# Patient Record
Sex: MALE | Race: WHITE | NOT HISPANIC OR LATINO | Employment: OTHER | URBAN - NONMETROPOLITAN AREA
[De-identification: names, ages, dates, MRNs, and addresses within clinical notes are randomized per-mention and may not be internally consistent; named-entity substitution may affect disease eponyms.]

---

## 2017-02-13 ENCOUNTER — OFFICE VISIT (OUTPATIENT)
Dept: CARDIOLOGY | Facility: CLINIC | Age: 67
End: 2017-02-13

## 2017-02-13 VITALS
WEIGHT: 196.8 LBS | BODY MASS INDEX: 27.55 KG/M2 | DIASTOLIC BLOOD PRESSURE: 75 MMHG | SYSTOLIC BLOOD PRESSURE: 129 MMHG | HEIGHT: 71 IN | HEART RATE: 75 BPM | OXYGEN SATURATION: 97 %

## 2017-02-13 DIAGNOSIS — R53.82 CHRONIC FATIGUE: ICD-10-CM

## 2017-02-13 DIAGNOSIS — R06.02 SHORTNESS OF BREATH: Primary | ICD-10-CM

## 2017-02-13 DIAGNOSIS — E78.00 PURE HYPERCHOLESTEROLEMIA: ICD-10-CM

## 2017-02-13 PROBLEM — I10 ESSENTIAL HYPERTENSION: Status: ACTIVE | Noted: 2017-02-13

## 2017-02-13 PROCEDURE — 99213 OFFICE O/P EST LOW 20 MIN: CPT | Performed by: PHYSICIAN ASSISTANT

## 2017-02-13 NOTE — PROGRESS NOTES
Problem list     Subjective   Clarence Melo is a 66 y.o. male     Chief Complaint   Patient presents with   • Follow-up     6 mo.       HPI         Problem list  1. Low risk stress test 2016  2. Preserved systolic function  3. Dyslipidemia  4. Obstructive sleep apnea being treated with CPAP    Patient is a 66-year-old male that presents back for routine follow-up. He has done remarkably well. He describes having excellent functional status and walks approximately 3 miles a day. He has no ischemic symptoms. He denies any chest pain or pressure. He has only very mild dyspnea with exertion but this is chronic and with exertion. He denies PND orthopnea. He does not palpitate or have dysrhythmic symptoms. Otherwise states he is doing well.    Outpatient Encounter Prescriptions as of 2/13/2017   Medication Sig Dispense Refill   • aspirin 81 MG EC tablet Take 81 mg by mouth daily.     • azelastine (ASTELIN) 0.1 % nasal spray 1 spray daily.     • CRESTOR 20 MG tablet 1 tablet Every Night. Hasn't be taking every day     • fluticasone (FLONASE) 50 MCG/ACT nasal spray 1 spray as needed.     • [DISCONTINUED] cetirizine (ZyrTEC) 10 MG tablet Take 10 mg by mouth daily.       No facility-administered encounter medications on file as of 2/13/2017.        Review of patient's allergies indicates no known allergies.    Past Medical History   Diagnosis Date   • Environmental allergies    • Environmental allergies    • Hyperlipidemia        Social History     Social History   • Marital status: Single     Spouse name: N/A   • Number of children: N/A   • Years of education: N/A     Occupational History   • Not on file.     Social History Main Topics   • Smoking status: Former Smoker   • Smokeless tobacco: Never Used   • Alcohol use 0.6 oz/week     1 Cans of beer per week      Comment: socially   • Drug use: No   • Sexual activity: Not on file     Other Topics Concern   • Not on file     Social History Narrative       Family History  "  Problem Relation Age of Onset   • Dementia Mother    • Heart disease Father    • Hyperlipidemia Father    • Alzheimer's disease Other    • Heart failure Other        Review of Systems   Constitutional: Negative.    HENT: Negative.    Eyes: Positive for visual disturbance (wears glasses).   Respiratory: Positive for shortness of breath.    Cardiovascular: Positive for chest pain and palpitations (occas.). Negative for leg swelling.   Gastrointestinal: Negative.    Endocrine: Negative.    Genitourinary: Negative.    Musculoskeletal: Negative.    Skin: Negative.    Allergic/Immunologic: Negative.    Neurological: Negative.    Hematological: Negative.    Psychiatric/Behavioral: Positive for sleep disturbance.       Objective     Visit Vitals   • /75 (BP Location: Left arm, Patient Position: Sitting)   • Pulse 75   • Ht 71\" (180.3 cm)   • Wt 196 lb 12.8 oz (89.3 kg)   • SpO2 97%   • BMI 27.45 kg/m2       Lab Results (most recent)     None          Physical Exam   Constitutional: He is oriented to person, place, and time. He appears well-developed and well-nourished. No distress.   HENT:   Head: Normocephalic and atraumatic.   Eyes: EOM are normal. Pupils are equal, round, and reactive to light.   Neck: No JVD present.   Cardiovascular: Normal rate, regular rhythm, normal heart sounds and intact distal pulses.  Exam reveals no gallop and no friction rub.    No murmur heard.  Pulmonary/Chest: Effort normal and breath sounds normal. No respiratory distress. He has no wheezes. He has no rales. He exhibits no tenderness.   Abdominal: Soft.   Musculoskeletal: Normal range of motion. He exhibits no edema.   Neurological: He is alert and oriented to person, place, and time. No cranial nerve deficit.   Skin: Skin is warm and dry. No rash noted. No erythema. No pallor.   Psychiatric: He has a normal mood and affect. His behavior is normal.   Nursing note and vitals reviewed.      Procedure   Procedures   "     Assessment/Plan     Problems Addressed this Visit        Cardiovascular and Mediastinum    Hyperlipidemia       Respiratory    Shortness of breath - Primary       Other    Chronic fatigue              Recommendations  1. Patient is doing remarkably well. Denies any ischemic symptoms. He is active and voices no complaints. Lipids are monitored by primary care provider. We will see him back follow-up in 6 months to one year. Follow-up primary as scheduled

## 2017-08-14 ENCOUNTER — OFFICE VISIT (OUTPATIENT)
Dept: CARDIOLOGY | Facility: CLINIC | Age: 67
End: 2017-08-14

## 2017-08-14 VITALS
SYSTOLIC BLOOD PRESSURE: 143 MMHG | WEIGHT: 196 LBS | OXYGEN SATURATION: 95 % | HEIGHT: 71 IN | HEART RATE: 70 BPM | BODY MASS INDEX: 27.44 KG/M2 | DIASTOLIC BLOOD PRESSURE: 78 MMHG

## 2017-08-14 DIAGNOSIS — J30.89 NON-SEASONAL ALLERGIC RHINITIS DUE TO OTHER ALLERGIC TRIGGER: ICD-10-CM

## 2017-08-14 DIAGNOSIS — R06.02 SOB (SHORTNESS OF BREATH) ON EXERTION: ICD-10-CM

## 2017-08-14 DIAGNOSIS — I10 ESSENTIAL HYPERTENSION: Primary | ICD-10-CM

## 2017-08-14 DIAGNOSIS — E78.5 HYPERLIPIDEMIA, UNSPECIFIED HYPERLIPIDEMIA TYPE: ICD-10-CM

## 2017-08-14 PROCEDURE — 99213 OFFICE O/P EST LOW 20 MIN: CPT | Performed by: PHYSICIAN ASSISTANT

## 2017-08-14 PROCEDURE — 93000 ELECTROCARDIOGRAM COMPLETE: CPT | Performed by: PHYSICIAN ASSISTANT

## 2017-08-14 RX ORDER — TRAMADOL HYDROCHLORIDE 50 MG/1
50 TABLET ORAL AS NEEDED
COMMUNITY
End: 2018-01-29

## 2017-08-14 NOTE — PROGRESS NOTES
Problem list     Subjective   Clarence Melo is a 67 y.o. male     Chief Complaint   Patient presents with   • Follow-up     patient appears in office today for routine follow up ; patient denies any new onset cardiac issues at this time        HPI    Problem list  1. Low risk stress test 2016  2. Preserved systolic function  3. Dyslipidemia  4. Obstructive sleep apnea being treated with CPAP    Patient is a 67-year-old male that presents back to office for follow-up.  He describes doing well.  He has no chest pain or pressure.  He does experience mild dyspnea with exertion.  He feels that much of this is related to nasal congestion.  He describes that he can do exertional activities.  For instance, he complained golf as he did Friday, Saturday, and Sunday.  Patient describes that he pushed most his yard with no significant symptoms other than very mild dyspnea.  He denies PND orthopnea.  He doesn't palpitate or have dysrhythmic symptoms and otherwise is doing well      Outpatient Encounter Prescriptions as of 8/14/2017   Medication Sig Dispense Refill   • aspirin 81 MG EC tablet Take 81 mg by mouth daily.     • azelastine (ASTELIN) 0.1 % nasal spray 1 spray daily.     • CRESTOR 20 MG tablet 20 mg Every Night. Hasn't be taking every day     • fluticasone (FLONASE) 50 MCG/ACT nasal spray 1 spray as needed.     • traMADol (ULTRAM) 50 MG tablet Take 50 mg by mouth As Needed (for sleep).       No facility-administered encounter medications on file as of 8/14/2017.        Review of patient's allergies indicates no known allergies.    Past Medical History:   Diagnosis Date   • Environmental allergies    • Environmental allergies    • Hyperlipidemia        Social History     Social History   • Marital status: Single     Spouse name: N/A   • Number of children: N/A   • Years of education: N/A     Occupational History   • Not on file.     Social History Main Topics   • Smoking status: Former Smoker   • Smokeless tobacco: Never  "Used   • Alcohol use 0.6 oz/week     1 Cans of beer per week      Comment: socially   • Drug use: No   • Sexual activity: Defer     Other Topics Concern   • Not on file     Social History Narrative       Family History   Problem Relation Age of Onset   • Dementia Mother    • Heart disease Father    • Hyperlipidemia Father    • Alzheimer's disease Other    • Heart failure Other        Review of Systems   Constitutional: Negative.  Negative for fatigue.   HENT: Negative.    Eyes: Positive for visual disturbance (wears glasses daily).   Respiratory: Positive for shortness of breath (on exertion o nly). Negative for cough, chest tightness and wheezing.    Cardiovascular: Negative for chest pain, palpitations (occasional flutters) and leg swelling.   Gastrointestinal: Negative.  Negative for abdominal pain, nausea and vomiting.   Endocrine: Negative.  Negative for cold intolerance, heat intolerance, polyphagia and polyuria.   Genitourinary: Negative.  Negative for difficulty urinating, frequency and urgency.   Musculoskeletal: Negative.  Negative for arthralgias, back pain, myalgias, neck pain and neck stiffness.   Skin: Negative.  Negative for rash and wound.   Allergic/Immunologic: Positive for environmental allergies (seasonal allergies). Negative for food allergies.   Neurological: Negative.  Negative for dizziness, weakness, light-headedness and headaches.   Hematological: Negative.  Does not bruise/bleed easily.   Psychiatric/Behavioral: Negative for agitation, confusion and sleep disturbance (denies waking up SOA). The patient is not nervous/anxious.        Objective     /78 (BP Location: Left arm, Patient Position: Sitting)  Pulse 70  Ht 71\" (180.3 cm)  Wt 196 lb (88.9 kg)  SpO2 95%  BMI 27.34 kg/m2    Lab Results (most recent)     None          Physical Exam   Constitutional: He is oriented to person, place, and time. He appears well-developed and well-nourished. No distress.   HENT:   Head: " Normocephalic and atraumatic.   Eyes: EOM are normal. Pupils are equal, round, and reactive to light.   Neck: No JVD present.   Cardiovascular: Normal rate, regular rhythm and normal heart sounds.  Exam reveals no gallop and no friction rub.    No murmur heard.  Pulmonary/Chest: Effort normal and breath sounds normal. No respiratory distress. He has no wheezes. He has no rales. He exhibits no tenderness.   Musculoskeletal: Normal range of motion. He exhibits no edema.   Neurological: He is alert and oriented to person, place, and time. No cranial nerve deficit.   Skin: Skin is warm and dry. No rash noted. No erythema. No pallor.   Psychiatric: He has a normal mood and affect. His behavior is normal.   Nursing note and vitals reviewed.      Procedure     ECG 12 Lead  Date/Time: 8/14/2017 9:29 AM  Performed by: BAILEE WHITE  Authorized by: BAILEE WHITE   Comments: HTN  SOB    EKG demonstrates sinus rhythm at 67 bpm, normal axis, nonspecific T-wave changes.               Assessment/Plan     Problems Addressed this Visit        Cardiovascular and Mediastinum    Hyperlipidemia    Essential hypertension - Primary    Relevant Orders    ECG 12 Lead       Respiratory    Perennial allergic rhinitis    Relevant Orders    Ambulatory Referral to ENT (Otolaryngology)    SOB (shortness of breath) on exertion    Relevant Orders    ECG 12 Lead              Recommendation  1.  Patient doing well at this time.  He denies symptoms of angina, failure, or dysrhythmia.  He would like to be referred to ENT in regards to symptoms concerning for allergic rhinitis.  Otherwise is without ischemic symptoms.  He is on appropriate medications.  Recent lipids were reviewed by Dr. Light which were normal per patient report.  We will see him back for follow-up in 6 months or sooner symptoms discussed.  Follow-up primary as scheduled

## 2017-08-29 ENCOUNTER — TELEPHONE (OUTPATIENT)
Dept: CARDIOLOGY | Facility: CLINIC | Age: 67
End: 2017-08-29

## 2017-08-29 NOTE — TELEPHONE ENCOUNTER
I have called and spoke with patient , he stated earlier today he was having some SOB, the ENT told him to call and make us aware of this. When I called patient he stated he was feeling much better and was no longer SOB. He denied exertion when he was having this SOB, thinks it may be anxiety. I notified him that I would discuss this with Leandro Luz PA-C and let him know if there was any further orders. -; [facility]

## 2017-08-29 NOTE — TELEPHONE ENCOUNTER
Patient sees Leandro and we referred him to an ENT. His ENT requested we call him back if he continues to have further trouble with his breathing, which he states he is. He can be reached at [phone]

## 2017-08-29 NOTE — TELEPHONE ENCOUNTER
Patient called in and said that he saw Leandro and was referred out to an ENT. He calls to say that he was told by his ENT if he is still having difficulty breathing to contact us and let us know. He can be reached at [phone]

## 2018-01-29 ENCOUNTER — OFFICE VISIT (OUTPATIENT)
Dept: CARDIOLOGY | Facility: CLINIC | Age: 68
End: 2018-01-29

## 2018-01-29 VITALS
SYSTOLIC BLOOD PRESSURE: 140 MMHG | BODY MASS INDEX: 27.69 KG/M2 | HEART RATE: 84 BPM | DIASTOLIC BLOOD PRESSURE: 83 MMHG | OXYGEN SATURATION: 95 % | WEIGHT: 197.8 LBS | HEIGHT: 71 IN

## 2018-01-29 DIAGNOSIS — R06.02 SHORTNESS OF BREATH: Primary | ICD-10-CM

## 2018-01-29 DIAGNOSIS — R07.9 CHEST PAIN, UNSPECIFIED TYPE: ICD-10-CM

## 2018-01-29 DIAGNOSIS — R53.83 FATIGUE, UNSPECIFIED TYPE: ICD-10-CM

## 2018-01-29 DIAGNOSIS — R06.02 SHORTNESS OF BREATH: ICD-10-CM

## 2018-01-29 PROCEDURE — 99214 OFFICE O/P EST MOD 30 MIN: CPT | Performed by: PHYSICIAN ASSISTANT

## 2018-01-29 PROCEDURE — 93000 ELECTROCARDIOGRAM COMPLETE: CPT | Performed by: PHYSICIAN ASSISTANT

## 2018-01-29 RX ORDER — AMOXICILLIN 500 MG/1
1000 CAPSULE ORAL 2 TIMES DAILY
COMMUNITY
End: 2018-02-19

## 2018-01-29 RX ORDER — NITROGLYCERIN 0.4 MG/1
TABLET SUBLINGUAL
Qty: 100 TABLET | Refills: 11 | Status: SHIPPED | OUTPATIENT
Start: 2018-01-29 | End: 2021-12-20 | Stop reason: SDUPTHER

## 2018-01-29 RX ORDER — ZOLPIDEM TARTRATE 10 MG/1
5 TABLET ORAL NIGHTLY
COMMUNITY
Start: 2018-01-16 | End: 2019-02-19

## 2018-01-29 RX ORDER — ALBUTEROL SULFATE 90 UG/1
AEROSOL, METERED RESPIRATORY (INHALATION)
COMMUNITY
Start: 2017-12-04 | End: 2019-02-19

## 2018-01-29 RX ORDER — SILDENAFIL CITRATE 20 MG/1
20 TABLET ORAL
COMMUNITY
End: 2022-02-24 | Stop reason: ALTCHOICE

## 2018-01-29 NOTE — PROGRESS NOTES
Problem list     Subjective   Clarence Melo is a 67 y.o. male     Chief Complaint   Patient presents with   • Shortness of Breath     worsened, here for eval.       HPI    Problem list  1. Low risk stress test 2016  2. Preserved systolic function  3. Dyslipidemia  4. Obstructive sleep apnea being treated with CPAP    Patient is a 67-year-old male that presents back for routine follow-up.  Mr. Melo describes feeling poorly.  His wife, who accompanies him,  agrees that patient has had a steady decline.  He has had a decrease in functional status.  Patient has history of being quite active.  He was former  and .  He walks frequently.  Unfortunately, his energy level has declined significantly.  He does less activity with more shortness of breath.  His shortness of breath seems to limit him in regards to doing activity.  Patient is no longer able to play golf because of the shortness of breath.  At night, Mr. Melo describes having chest pressure.  It is usually only experienced at night.  He does not recall any during the day hours.  Wife is very concerned because patient's overall fatigue, weakness and significant dyspnea.    He doesn't palpitate or have dysrhythmic symptoms.  He otherwise voices no complaints      Outpatient Encounter Prescriptions as of 1/29/2018   Medication Sig Dispense Refill   • amoxicillin (AMOXIL) 500 MG capsule Take 1,000 mg by mouth 2 (Two) Times a Day.     • aspirin 81 MG EC tablet Take 81 mg by mouth daily.     • CRESTOR 20 MG tablet 20 mg Every Night.     • fluticasone (FLONASE) 50 MCG/ACT nasal spray 1 spray as needed.     • sildenafil (REVATIO) 20 MG tablet Take 20 mg by mouth. prn     • VENTOLIN  (90 Base) MCG/ACT inhaler prn     • zolpidem (AMBIEN) 10 MG tablet 5 mg Every Night.     • nitroglycerin (NITROSTAT) 0.4 MG SL tablet 1 under the tongue as needed for angina, may repeat q5mins for up three doses 100 tablet 11   • [DISCONTINUED] azelastine (ASTELIN)  "0.1 % nasal spray 1 spray daily.     • [DISCONTINUED] traMADol (ULTRAM) 50 MG tablet Take 50 mg by mouth As Needed (for sleep).       No facility-administered encounter medications on file as of 1/29/2018.        Review of patient's allergies indicates no known allergies.    Past Medical History:   Diagnosis Date   • Environmental allergies    • Environmental allergies    • Hyperlipidemia    • Sleep apnea     has a c-pap       Social History     Social History   • Marital status: Single     Spouse name: N/A   • Number of children: N/A   • Years of education: N/A     Occupational History   • Not on file.     Social History Main Topics   • Smoking status: Former Smoker   • Smokeless tobacco: Never Used   • Alcohol use 0.6 oz/week     1 Cans of beer per week      Comment: socially   • Drug use: No   • Sexual activity: Defer     Other Topics Concern   • Not on file     Social History Narrative       Family History   Problem Relation Age of Onset   • Dementia Mother    • Heart disease Father    • Hyperlipidemia Father    • Alzheimer's disease Other    • Heart failure Other        Review of Systems   Constitutional: Positive for fatigue.   HENT:        Chronic Rt. Ear and nasal area issues.    Eyes: Positive for visual disturbance (glasses).   Respiratory: Positive for shortness of breath.    Cardiovascular: Positive for chest pain.   Gastrointestinal: Negative.    Endocrine: Negative.    Genitourinary: Negative.    Musculoskeletal: Positive for arthralgias and myalgias.   Skin: Negative.    Allergic/Immunologic: Positive for environmental allergies.   Neurological: Positive for dizziness and light-headedness.        Occas.   Hematological: Negative.    Psychiatric/Behavioral: Positive for sleep disturbance.       Objective   Vitals:    01/29/18 0927   BP: 140/83   BP Location: Left arm   Patient Position: Sitting   Pulse: 84   SpO2: 95%   Weight: 89.7 kg (197 lb 12.8 oz)   Height: 180.3 cm (70.98\")      /83 (BP " "Location: Left arm, Patient Position: Sitting)  Pulse 84  Ht 180.3 cm (70.98\")  Wt 89.7 kg (197 lb 12.8 oz)  SpO2 95%  BMI 27.6 kg/m2    Lab Results (most recent)     None          Physical Exam   Constitutional: He is oriented to person, place, and time. He appears well-developed and well-nourished. No distress.   HENT:   Head: Normocephalic and atraumatic.   Eyes: EOM are normal. Pupils are equal, round, and reactive to light.   Neck: No JVD present.   Cardiovascular: Normal rate, regular rhythm and normal heart sounds.  Exam reveals no gallop and no friction rub.    No murmur heard.  Pulmonary/Chest: Effort normal and breath sounds normal. No respiratory distress. He has no wheezes. He has no rales. He exhibits no tenderness.   Musculoskeletal: Normal range of motion. He exhibits no edema.   Neurological: He is alert and oriented to person, place, and time. No cranial nerve deficit.   Skin: Skin is warm and dry. No rash noted. No erythema. No pallor.   Psychiatric: He has a normal mood and affect. His behavior is normal.   Nursing note and vitals reviewed.      Procedure     ECG 12 Lead  Date/Time: 1/29/2018 9:36 AM  Performed by: BAILEE WHITE  Authorized by: BAILEE WHITE   Comments: EKG indication shortness of breath    EKG demonstrates sinus rhythm at 74 bpm, otherwise normal               Assessment/Plan     Problems Addressed this Visit        Respiratory    Shortness of breath - Primary    Relevant Orders    ECG 12 Lead    Stress Test With Myocardial Perfusion One Day    Adult Transthoracic Echo Complete W/ Cont if Necessary Per Protocol    D-dimer, Quantitative    CBC & Differential    Comprehensive Metabolic Panel    Vitamin B12    Folate       Nervous and Auditory    Chest pain    Relevant Orders    Stress Test With Myocardial Perfusion One Day    Adult Transthoracic Echo Complete W/ Cont if Necessary Per Protocol    D-dimer, Quantitative    CBC & Differential    Comprehensive Metabolic " Panel    Vitamin B12    Folate       Other    Fatigue    Relevant Orders    ECG 12 Lead    Stress Test With Myocardial Perfusion One Day    Adult Transthoracic Echo Complete W/ Cont if Necessary Per Protocol    D-dimer, Quantitative    CBC & Differential    Comprehensive Metabolic Panel    Vitamin B12    Folate            Recommendation  1.  Patient has decline in functional status, profound exertional dyspnea, and now is developing chest pressure.  He describes to me he is not able to walk on a treadmill but I do feel he needs an ischemia assessment on an expedited basis.  Therefore we'll schedule Lexiscan stress test.  Echocardiogram to evaluate LV function.  2.  Would like to perform routine laboratories.  D-dimer to rule out noncardiac causes of chest pain and dyspnea.  Patient has occasional muscle cramping as well as symptoms concerning for neuropathy.  Would like to check routine laboratories for that as well.  3.  We are sending in nitroglycerin as needed for chest pain.  Any chest pain not resolved by nitroglycerin, he will go to the ER.  I would like to see him back in one to 2 weeks.  Follow-up with primary as scheduled         Electronically signed by:

## 2018-01-31 ENCOUNTER — OUTSIDE FACILITY SERVICE (OUTPATIENT)
Dept: CARDIOLOGY | Facility: CLINIC | Age: 68
End: 2018-01-31

## 2018-01-31 ENCOUNTER — HOSPITAL ENCOUNTER (OUTPATIENT)
Dept: CARDIOLOGY | Facility: HOSPITAL | Age: 68
Discharge: HOME | End: 2018-01-31

## 2018-01-31 LAB
MAXIMAL PREDICTED HEART RATE: 153 BPM
MAXIMAL PREDICTED HEART RATE: 153 BPM
STRESS TARGET HR: 130 BPM
STRESS TARGET HR: 130 BPM

## 2018-01-31 PROCEDURE — 93017 CV STRESS TEST TRACING ONLY: CPT

## 2018-01-31 PROCEDURE — 0 TECHNETIUM SESTAMIBI: Performed by: INTERNAL MEDICINE

## 2018-01-31 PROCEDURE — 78452 HT MUSCLE IMAGE SPECT MULT: CPT

## 2018-01-31 PROCEDURE — 93018 CV STRESS TEST I&R ONLY: CPT | Performed by: INTERNAL MEDICINE

## 2018-01-31 PROCEDURE — 93306 TTE W/DOPPLER COMPLETE: CPT | Performed by: INTERNAL MEDICINE

## 2018-01-31 PROCEDURE — 78452 HT MUSCLE IMAGE SPECT MULT: CPT | Performed by: INTERNAL MEDICINE

## 2018-01-31 PROCEDURE — A9500 TC99M SESTAMIBI: HCPCS | Performed by: INTERNAL MEDICINE

## 2018-01-31 PROCEDURE — 25010000002 REGADENOSON 0.4 MG/5ML SOLUTION: Performed by: INTERNAL MEDICINE

## 2018-01-31 PROCEDURE — 93306 TTE W/DOPPLER COMPLETE: CPT

## 2018-01-31 RX ADMIN — TECHNETIUM TC 99M SESTAMIBI 1 DOSE: 1 INJECTION INTRAVENOUS at 13:00

## 2018-01-31 RX ADMIN — REGADENOSON 0.4 MG: 0.08 INJECTION, SOLUTION INTRAVENOUS at 13:00

## 2018-02-01 ENCOUNTER — DOCUMENTATION (OUTPATIENT)
Dept: CARDIOLOGY | Facility: CLINIC | Age: 68
End: 2018-02-01

## 2018-02-19 ENCOUNTER — OFFICE VISIT (OUTPATIENT)
Dept: CARDIOLOGY | Facility: CLINIC | Age: 68
End: 2018-02-19

## 2018-02-19 VITALS
SYSTOLIC BLOOD PRESSURE: 123 MMHG | BODY MASS INDEX: 27.61 KG/M2 | HEART RATE: 84 BPM | HEIGHT: 71 IN | DIASTOLIC BLOOD PRESSURE: 76 MMHG | WEIGHT: 197.2 LBS | OXYGEN SATURATION: 96 %

## 2018-02-19 DIAGNOSIS — R53.82 CHRONIC FATIGUE: ICD-10-CM

## 2018-02-19 DIAGNOSIS — R06.02 SHORTNESS OF BREATH: Primary | ICD-10-CM

## 2018-02-19 DIAGNOSIS — E78.00 PURE HYPERCHOLESTEROLEMIA: ICD-10-CM

## 2018-02-19 PROCEDURE — 99213 OFFICE O/P EST LOW 20 MIN: CPT | Performed by: PHYSICIAN ASSISTANT

## 2018-02-19 NOTE — PROGRESS NOTES
Problem list     Subjective   Clarence Melo is a 67 y.o. male     Chief Complaint   Patient presents with   • Hypertension     Here for testing f/u   • Hyperlipidemia   • Palpitations       HPI       Problem list  1. Low risk stress test 2016  1.1 low risk stress test January 2018  2. Preserved systolic function  3. Dyslipidemia  4. Obstructive sleep apnea being treated with CPAP    Patient is a 67-year-old male that presents back to follow-up on stress test, echocardiogram and laboratories.  Stress test indicated normal systolic function with no evidence of ischemia.  Echocardiogram was largely normal with normal systolic function, no effusion, no significant pericardial, great vessel disease or valvular heart disease.  Laboratories were benign    Patient is doing remarkably well.  He does not experience chest pain or pressure.  He has mild dyspnea usually will help levels of activity but no significant shortness of breath at baseline.  Denies PND orthopnea.  He doesn't palpitate, have dizziness presyncope or syncope.  Occasionally will have orthostatic dizziness but otherwise he feels remarkably well this time he can be active with no limitation.      Outpatient Encounter Prescriptions as of 2/19/2018   Medication Sig Dispense Refill   • aspirin 81 MG EC tablet Take 81 mg by mouth daily.     • CRESTOR 20 MG tablet 20 mg Every Night.     • sildenafil (REVATIO) 20 MG tablet Take 20 mg by mouth. prn     • VENTOLIN  (90 Base) MCG/ACT inhaler prn     • zolpidem (AMBIEN) 10 MG tablet 5 mg Every Night.     • fluticasone (FLONASE) 50 MCG/ACT nasal spray 1 spray as needed.     • nitroglycerin (NITROSTAT) 0.4 MG SL tablet 1 under the tongue as needed for angina, may repeat q5mins for up three doses 100 tablet 11   • [DISCONTINUED] amoxicillin (AMOXIL) 500 MG capsule Take 1,000 mg by mouth 2 (Two) Times a Day.       No facility-administered encounter medications on file as of 2/19/2018.        Review of patient's  "allergies indicates no known allergies.    Past Medical History:   Diagnosis Date   • Environmental allergies    • Environmental allergies    • Hyperlipidemia    • Sleep apnea     has a c-pap       Social History     Social History   • Marital status: Single     Spouse name: N/A   • Number of children: N/A   • Years of education: N/A     Occupational History   • Not on file.     Social History Main Topics   • Smoking status: Former Smoker   • Smokeless tobacco: Never Used   • Alcohol use 0.6 oz/week     1 Cans of beer per week      Comment: socially   • Drug use: No   • Sexual activity: Defer     Other Topics Concern   • Not on file     Social History Narrative       Family History   Problem Relation Age of Onset   • Dementia Mother    • Heart disease Father    • Hyperlipidemia Father    • Alzheimer's disease Other    • Heart failure Other        Review of Systems   Constitutional: Positive for fatigue.   HENT: Positive for sore throat.    Eyes: Positive for visual disturbance (glasses).   Respiratory: Positive for shortness of breath.    Cardiovascular: Negative.    Gastrointestinal: Negative.    Endocrine: Negative.    Genitourinary: Negative.    Musculoskeletal: Positive for arthralgias and myalgias.   Skin: Negative.    Allergic/Immunologic: Positive for environmental allergies.   Neurological: Positive for dizziness (occas.).   Hematological: Negative.    Psychiatric/Behavioral: Positive for sleep disturbance.       Objective   Vitals:    02/19/18 0932   BP: 123/76   BP Location: Left arm   Patient Position: Sitting   Pulse: 84   SpO2: 96%   Weight: 89.4 kg (197 lb 3.2 oz)   Height: 180.3 cm (70.98\")      /76 (BP Location: Left arm, Patient Position: Sitting)  Pulse 84  Ht 180.3 cm (70.98\")  Wt 89.4 kg (197 lb 3.2 oz)  SpO2 96%  BMI 27.52 kg/m2    Lab Results (most recent)     None          Physical Exam   Constitutional: He is oriented to person, place, and time. He appears well-developed and " well-nourished. No distress.   HENT:   Head: Normocephalic and atraumatic.   Eyes: EOM are normal. Pupils are equal, round, and reactive to light.   Neck: No JVD present.   Cardiovascular: Normal rate, regular rhythm and normal heart sounds.  Exam reveals no gallop and no friction rub.    No murmur heard.  Pulmonary/Chest: Effort normal and breath sounds normal. No respiratory distress. He has no wheezes. He has no rales. He exhibits no tenderness.   Musculoskeletal: Normal range of motion. He exhibits no edema.   Neurological: He is alert and oriented to person, place, and time. No cranial nerve deficit.   Skin: Skin is warm and dry. No rash noted. No erythema. No pallor.   Psychiatric: He has a normal mood and affect. His behavior is normal.   Nursing note and vitals reviewed.      Procedure   Procedures       Assessment/Plan     Problems Addressed this Visit        Cardiovascular and Mediastinum    Pure hypercholesterolemia       Respiratory    Shortness of breath - Primary       Other    Chronic fatigue            Recommendation  1.  Patient doing remarkably well.  Stress test and echocardiogram normal.  Patient with no ischemic symptoms and normal functional status.  Mild fatigue but nothing in regards to laboratory to explain the etiology.  For now, we will see him back for follow-up in a year or sooner symptoms discussed.  He is to follow-up primary as scheduled         Electronically signed by:

## 2019-02-19 ENCOUNTER — OFFICE VISIT (OUTPATIENT)
Dept: CARDIOLOGY | Facility: CLINIC | Age: 69
End: 2019-02-19

## 2019-02-19 VITALS
SYSTOLIC BLOOD PRESSURE: 124 MMHG | WEIGHT: 196.6 LBS | BODY MASS INDEX: 27.52 KG/M2 | DIASTOLIC BLOOD PRESSURE: 68 MMHG | HEART RATE: 77 BPM | OXYGEN SATURATION: 97 % | HEIGHT: 71 IN

## 2019-02-19 DIAGNOSIS — E78.00 PURE HYPERCHOLESTEROLEMIA: ICD-10-CM

## 2019-02-19 DIAGNOSIS — R53.82 CHRONIC FATIGUE: ICD-10-CM

## 2019-02-19 DIAGNOSIS — R06.02 SOB (SHORTNESS OF BREATH) ON EXERTION: Primary | ICD-10-CM

## 2019-02-19 PROCEDURE — 99213 OFFICE O/P EST LOW 20 MIN: CPT | Performed by: PHYSICIAN ASSISTANT

## 2019-02-19 NOTE — PROGRESS NOTES
Problem list     Subjective   Clarence Melo is a 68 y.o. male     Chief Complaint   Patient presents with   • Follow-up     1 year   • Hypertension          Problem list  1. Low risk stress test 2016  1.1 low risk stress test January 2018  2. Preserved systolic function  3. Dyslipidemia  4. Obstructive sleep apnea being treated with CPAP        HPI    Patient is a 68-year-old male that presents back to the office for follow-up.  Patient is doing relatively well.    He has no chest discomfort or pressure in any way.  Patient otherwise describes being quite active.  He has very minimal dyspnea when trying to exert or do activity but this is chronic.  He tries to walk daily and has no ischemic symptoms.  He has no PND orthopnea.    He doesn't palpitate or have dysrhythmic symptoms.  He doesn't claudicate nor have symptoms of cerebral embolic events.  He is doing remarkably well otherwise      Outpatient Encounter Medications as of 2/19/2019   Medication Sig Dispense Refill   • CRESTOR 20 MG tablet 20 mg Every Night.     • fluticasone (FLONASE) 50 MCG/ACT nasal spray 1 spray as needed.     • nitroglycerin (NITROSTAT) 0.4 MG SL tablet 1 under the tongue as needed for angina, may repeat q5mins for up three doses 100 tablet 11   • sildenafil (REVATIO) 20 MG tablet Take 20 mg by mouth. prn     • [DISCONTINUED] aspirin 81 MG EC tablet Take 81 mg by mouth daily.     • [DISCONTINUED] VENTOLIN  (90 Base) MCG/ACT inhaler prn     • [DISCONTINUED] zolpidem (AMBIEN) 10 MG tablet 5 mg Every Night.       No facility-administered encounter medications on file as of 2/19/2019.        Patient has no known allergies.    Past Medical History:   Diagnosis Date   • Environmental allergies    • Environmental allergies    • Hyperlipidemia    • Sleep apnea     has a c-pap       Social History     Socioeconomic History   • Marital status: Single     Spouse name: Not on file   • Number of children: Not on file   • Years of education: Not  on file   • Highest education level: Not on file   Social Needs   • Financial resource strain: Not on file   • Food insecurity - worry: Not on file   • Food insecurity - inability: Not on file   • Transportation needs - medical: Not on file   • Transportation needs - non-medical: Not on file   Occupational History   • Not on file   Tobacco Use   • Smoking status: Former Smoker     Packs/day: 0.25     Years: 8.00     Pack years: 2.00   • Smokeless tobacco: Never Used   Substance and Sexual Activity   • Alcohol use: Yes     Alcohol/week: 0.6 oz     Types: 1 Cans of beer per week     Comment: socially   • Drug use: No   • Sexual activity: Defer   Other Topics Concern   • Not on file   Social History Narrative   • Not on file       Family History   Problem Relation Age of Onset   • Dementia Mother    • Heart disease Father    • Hyperlipidemia Father    • Alzheimer's disease Other    • Heart failure Other    • No Known Problems Sister    • No Known Problems Brother    • No Known Problems Maternal Aunt    • No Known Problems Maternal Uncle    • No Known Problems Paternal Aunt    • No Known Problems Paternal Uncle    • No Known Problems Maternal Grandmother    • No Known Problems Maternal Grandfather    • No Known Problems Paternal Grandmother    • No Known Problems Paternal Grandfather    • Anemia Neg Hx    • Arrhythmia Neg Hx    • Asthma Neg Hx    • Clotting disorder Neg Hx    • Fainting Neg Hx    • Heart attack Neg Hx    • Hypertension Neg Hx        Review of Systems   Constitutional: Positive for fatigue.   HENT: Negative.    Eyes: Positive for visual disturbance (wears glasses).   Respiratory: Positive for apnea (unable to tolerate cpap) and shortness of breath (on exertion and more near the end of day).    Cardiovascular: Negative.  Negative for chest pain, palpitations and leg swelling.   Gastrointestinal: Negative.    Endocrine: Negative.    Genitourinary: Negative.    Musculoskeletal: Positive for arthralgias,  "back pain and neck pain.   Skin: Negative.    Allergic/Immunologic: Negative.    Neurological: Negative.    Hematological: Negative.  Does not bruise/bleed easily.   Psychiatric/Behavioral: Negative.    All other systems reviewed and are negative.      Objective   Vitals:    02/19/19 0834   BP: 124/68   BP Location: Right arm   Patient Position: Sitting   Pulse: 77   SpO2: 97%   Weight: 89.2 kg (196 lb 9.6 oz)   Height: 180.3 cm (70.98\")      /68 (BP Location: Right arm, Patient Position: Sitting)   Pulse 77   Ht 180.3 cm (70.98\")   Wt 89.2 kg (196 lb 9.6 oz)   SpO2 97%   BMI 27.44 kg/m²     Lab Results (most recent)     None          Physical Exam   Constitutional: He is oriented to person, place, and time. He appears well-developed and well-nourished. No distress.   HENT:   Head: Normocephalic and atraumatic.   Eyes: EOM are normal. Pupils are equal, round, and reactive to light.   Neck: No JVD present.   Cardiovascular: Normal rate, regular rhythm, normal heart sounds and intact distal pulses. Exam reveals no gallop and no friction rub.   No murmur heard.  Pulmonary/Chest: Effort normal and breath sounds normal. No respiratory distress. He has no wheezes. He has no rales. He exhibits no tenderness.   Musculoskeletal: Normal range of motion. He exhibits no edema.   Neurological: He is alert and oriented to person, place, and time. No cranial nerve deficit.   Skin: Skin is warm and dry. No rash noted. No erythema. No pallor.   Psychiatric: He has a normal mood and affect. His behavior is normal.   Nursing note and vitals reviewed.      Procedure   Procedures       Assessment/Plan     Problems Addressed this Visit        Cardiovascular and Mediastinum    Pure hypercholesterolemia       Respiratory    SOB (shortness of breath) on exertion - Primary       Other    Chronic fatigue            Recommendation  1.  Patient doing well at this time.  No symptoms of angina, failure, or arrhythmia.  2.  Dyspnea is " minimal blood pressures were controlled and lipids are monitored by primary.  Fatigue is minimal.  We'll see patient back for follow-up in one year or sooner symptoms discussed.  Follow-up primary as scheduled              Patient's Body mass index is 27.44 kg/m². BMI is within normal parameters. No follow-up required..       Electronically signed by:

## 2020-02-24 ENCOUNTER — OFFICE VISIT (OUTPATIENT)
Dept: CARDIOLOGY | Facility: CLINIC | Age: 70
End: 2020-02-24

## 2020-02-24 VITALS
HEIGHT: 71 IN | DIASTOLIC BLOOD PRESSURE: 84 MMHG | WEIGHT: 196.4 LBS | SYSTOLIC BLOOD PRESSURE: 140 MMHG | BODY MASS INDEX: 27.5 KG/M2 | HEART RATE: 85 BPM | OXYGEN SATURATION: 97 %

## 2020-02-24 DIAGNOSIS — R06.02 SOB (SHORTNESS OF BREATH): Primary | ICD-10-CM

## 2020-02-24 DIAGNOSIS — I10 ESSENTIAL HYPERTENSION: ICD-10-CM

## 2020-02-24 DIAGNOSIS — Z00.00 HEALTH CARE MAINTENANCE: ICD-10-CM

## 2020-02-24 DIAGNOSIS — E78.00 PURE HYPERCHOLESTEROLEMIA: ICD-10-CM

## 2020-02-24 PROCEDURE — 93000 ELECTROCARDIOGRAM COMPLETE: CPT | Performed by: PHYSICIAN ASSISTANT

## 2020-02-24 PROCEDURE — 99213 OFFICE O/P EST LOW 20 MIN: CPT | Performed by: PHYSICIAN ASSISTANT

## 2020-02-24 RX ORDER — LEVOTHYROXINE SODIUM 0.03 MG/1
25 TABLET ORAL
COMMUNITY
Start: 2020-02-13

## 2020-02-24 NOTE — PROGRESS NOTES
Problem list     Subjective   Clarence Melo is a 69 y.o. male     Chief Complaint   Patient presents with   • Shortness of Breath     here for 1 year f/u      Problem list  1. Low risk stress test 2016  1.1 low risk stress test January 2018  2. Preserved systolic function  3. Dyslipidemia  4. Obstructive sleep apnea being treated with CPAP    HPI    Patient is a 69-year-old male that presents back to the office for follow-up.  Patient has done remarkably well.  He has no chest pain or pressure.  Patient describes being active.  He walks approximately a mile several days a week and does well.  He has very minimal dyspnea.  No progressive shortness of breath.  No PND orthopnea.    He does not palpitate or have dysrhythmic symptoms.  He otherwise feels remarkably well      Current Outpatient Medications on File Prior to Visit   Medication Sig Dispense Refill   • CRESTOR 20 MG tablet 20 mg Every Night.     • levothyroxine (SYNTHROID, LEVOTHROID) 25 MCG tablet Daily.     • nitroglycerin (NITROSTAT) 0.4 MG SL tablet 1 under the tongue as needed for angina, may repeat q5mins for up three doses 100 tablet 11   • sildenafil (REVATIO) 20 MG tablet Take 20 mg by mouth. prn     • VITAMIN D PO Take  by mouth Daily.     • [DISCONTINUED] fluticasone (FLONASE) 50 MCG/ACT nasal spray 1 spray as needed.       No current facility-administered medications on file prior to visit.        Patient has no known allergies.    Past Medical History:   Diagnosis Date   • Environmental allergies    • Environmental allergies    • Hyperlipidemia    • Sleep apnea     has a c-pap       Social History     Socioeconomic History   • Marital status: Single     Spouse name: Not on file   • Number of children: Not on file   • Years of education: Not on file   • Highest education level: Not on file   Tobacco Use   • Smoking status: Former Smoker     Packs/day: 0.25     Years: 8.00     Pack years: 2.00   • Smokeless tobacco: Never Used   Substance and Sexual  "Activity   • Alcohol use: Yes     Alcohol/week: 1.0 standard drinks     Types: 1 Cans of beer per week     Comment: socially   • Drug use: No   • Sexual activity: Defer       Family History   Problem Relation Age of Onset   • Dementia Mother    • Heart disease Father    • Hyperlipidemia Father    • Alzheimer's disease Other    • Heart failure Other    • No Known Problems Sister    • No Known Problems Brother    • No Known Problems Maternal Aunt    • No Known Problems Maternal Uncle    • No Known Problems Paternal Aunt    • No Known Problems Paternal Uncle    • No Known Problems Maternal Grandmother    • No Known Problems Maternal Grandfather    • No Known Problems Paternal Grandmother    • No Known Problems Paternal Grandfather    • Anemia Neg Hx    • Arrhythmia Neg Hx    • Asthma Neg Hx    • Clotting disorder Neg Hx    • Fainting Neg Hx    • Heart attack Neg Hx    • Hypertension Neg Hx        Review of Systems   Constitutional: Negative.    HENT:        Sinus issues   Eyes: Negative.    Respiratory: Positive for apnea (cpap) and shortness of breath (due to allergies).    Cardiovascular: Negative.  Negative for chest pain, palpitations and leg swelling.   Gastrointestinal: Negative.    Endocrine: Negative.    Genitourinary: Negative.    Musculoskeletal: Positive for arthralgias.   Skin: Negative.    Allergic/Immunologic: Negative.    Neurological: Negative.    Hematological: Negative.    Psychiatric/Behavioral: Negative.    All other systems reviewed and are negative.      Objective   Vitals:    02/24/20 0909   BP: 140/84   BP Location: Left arm   Patient Position: Sitting   Pulse: 85   SpO2: 97%   Weight: 89.1 kg (196 lb 6.4 oz)   Height: 180.3 cm (71\")      /84 (BP Location: Left arm, Patient Position: Sitting)   Pulse 85   Ht 180.3 cm (71\")   Wt 89.1 kg (196 lb 6.4 oz)   SpO2 97%   BMI 27.39 kg/m²     Lab Results (most recent)     None          Physical Exam   Constitutional: He is oriented to person, " place, and time. He appears well-developed and well-nourished. No distress.   HENT:   Head: Normocephalic and atraumatic.   Eyes: Pupils are equal, round, and reactive to light. EOM are normal.   Neck: No JVD present.   Cardiovascular: Normal rate, regular rhythm and normal heart sounds. Exam reveals no gallop and no friction rub.   No murmur heard.  Pulmonary/Chest: Effort normal and breath sounds normal. No respiratory distress. He has no wheezes. He has no rales. He exhibits no tenderness.   Musculoskeletal: Normal range of motion. He exhibits no edema.   Neurological: He is alert and oriented to person, place, and time. No cranial nerve deficit.   Skin: Skin is warm and dry. No rash noted. No erythema. No pallor.   Psychiatric: He has a normal mood and affect. His behavior is normal.   Nursing note and vitals reviewed.      Procedure     ECG 12 Lead  Date/Time: 2/24/2020 9:12 AM  Performed by: Leandro Luz PA  Authorized by: Leandro Luz PA   Comparison: compared with previous ECG from 1/28/2018  Comments: EKG demonstrate sinus rhythm 71 bpm with no acute ST changes               Assessment/Plan     Problems Addressed this Visit        Cardiovascular and Mediastinum    Essential hypertension    Pure hypercholesterolemia       Respiratory    SOB (shortness of breath) - Primary    Relevant Orders    ECG 12 Lead      Other Visit Diagnoses     Health care maintenance        Relevant Orders    ECG 12 Lead        Recommendation  1.  Patient doing remarkably well.  No symptoms of angina, failure or arrhythmia  2.  Dyspnea is very minimal.  It is mild at baseline with no progression.  Patient is otherwise quite active.  I do not feel further evaluation is warranted at this time  3.  Patient with dyslipidemia but currently on statin therapy and will continue.  Blood pressure slightly elevated today but according to the patient doing well  4.  For now, we will see him back in a year or PRN for change in  symptoms.  Will follow with primary as scheduled             Clarence Melo  reports that he has quit smoking. He has a 2.00 pack-year smoking history. He has never used smokeless tobacco..      Patient's Body mass index is 27.39 kg/m². BMI is within normal parameters. No follow-up required..       Electronically signed by:

## 2021-02-05 ENCOUNTER — IMMUNIZATION (OUTPATIENT)
Dept: VACCINE CLINIC | Facility: HOSPITAL | Age: 71
End: 2021-02-05

## 2021-02-05 PROCEDURE — 0001A: CPT | Performed by: INTERNAL MEDICINE

## 2021-02-05 PROCEDURE — 91300 HC SARSCOV02 VAC 30MCG/0.3ML IM: CPT | Performed by: INTERNAL MEDICINE

## 2021-02-24 ENCOUNTER — OFFICE VISIT (OUTPATIENT)
Dept: CARDIOLOGY | Facility: CLINIC | Age: 71
End: 2021-02-24

## 2021-02-24 VITALS
BODY MASS INDEX: 28.06 KG/M2 | WEIGHT: 200.4 LBS | OXYGEN SATURATION: 97 % | HEART RATE: 86 BPM | SYSTOLIC BLOOD PRESSURE: 120 MMHG | HEIGHT: 71 IN | DIASTOLIC BLOOD PRESSURE: 71 MMHG

## 2021-02-24 DIAGNOSIS — E78.00 PURE HYPERCHOLESTEROLEMIA: ICD-10-CM

## 2021-02-24 DIAGNOSIS — I34.0 MITRAL VALVE INSUFFICIENCY, UNSPECIFIED ETIOLOGY: ICD-10-CM

## 2021-02-24 DIAGNOSIS — R06.02 SOB (SHORTNESS OF BREATH): Primary | ICD-10-CM

## 2021-02-24 PROCEDURE — 99213 OFFICE O/P EST LOW 20 MIN: CPT | Performed by: PHYSICIAN ASSISTANT

## 2021-02-24 RX ORDER — ROSUVASTATIN CALCIUM 20 MG/1
20 TABLET, COATED ORAL DAILY
COMMUNITY
End: 2021-12-20

## 2021-02-24 RX ORDER — FEXOFENADINE HCL AND PSEUDOEPHEDRINE HCI 180; 240 MG/1; MG/1
1 TABLET, EXTENDED RELEASE ORAL DAILY
COMMUNITY
End: 2022-02-24 | Stop reason: DRUGHIGH

## 2021-02-24 NOTE — PATIENT INSTRUCTIONS
Advance Care Planning and Advance Directives     You make decisions on a daily basis - decisions about where you want to live, your career, your home, your life. Perhaps one of the most important decisions you face is your choice for future medical care. Take time to talk with your family and your healthcare team and start planning today.  Advance Care Planning is a process that can help you:  · Understand possible future healthcare decisions in light of your own experiences  · Reflect on those decision in light of your goals and values  · Discuss your decisions with those closest to you and the healthcare professionals that care for you  · Make a plan by creating a document that reflects your wishes    Surrogate Decision Maker  In the event of a medical emergency, which has left you unable to communicate or to make your own decisions, you would need someone to make decisions for you.  It is important to discuss your preferences for medical treatment with this person while you are in good health.     Qualities of a surrogate decision maker:  • Willing to take on this role and responsibility  • Knows what you want for future medical care  • Willing to follow your wishes even if they don't agree with them  • Able to make difficult medical decisions under stressful circumstances    Advance Directives  These are legal documents you can create that will guide your healthcare team and decision maker(s) when needed. These documents can be stored in the electronic medical record.    · Living Will - a legal document to guide your care if you have a terminal condition or a serious illness and are unable to communicate. States vary by statute in document names/types, but most forms may include one or more of the following:        -  Directions regarding life-prolonging treatments        -  Directions regarding artificially provided nutrition/hydration        -  Choosing a healthcare decision maker        -  Direction  regarding organ/tissue donation    · Durable Power of  for Healthcare - this document names an -in-fact to make medical decisions for you, but it may also allow this person to make personal and financial decisions for you. Please seek the advice of an  if you need this type of document.    **Advance Directives are not required and no one may discriminate against you if you do not sign one.    Medical Orders  Many states allow specific forms/orders signed by your physician to record your wishes for medical treatment in your current state of health. This form, signed in personal communication with your physician, addresses resuscitation and other medical interventions that you may or may not want.      For more information or to schedule a time with a [facility] Advance Care Planning Facilitator contact: [facility] or call [phone] and someone will contact you directly.Heart-Healthy Eating Plan  Heart-healthy meal planning includes:  · Eating less unhealthy fats.  · Eating more healthy fats.  · Making other changes in your diet.  Talk with your doctor or a diet specialist (dietitian) to create an eating plan that is right for you.  What is my plan?  Your doctor may recommend an eating plan that includes:  · Total fat: ______% or less of total calories a day.  · Saturated fat: ______% or less of total calories a day.  · Cholesterol: less than _________mg a day.  What are tips for following this plan?  Cooking  Avoid frying your food. Try to bake, boil, grill, or broil it instead. You can also reduce fat by:  · Removing the skin from poultry.  · Removing all visible fats from meats.  · Steaming vegetables in water or broth.  Meal planning    · At meals, divide your plate into four equal parts:  ? Fill one-half of your plate with vegetables and green salads.  ? Fill one-fourth of your plate with whole grains.  ? Fill one-fourth of your plate with lean protein foods.  · Eat 4-5  servings of vegetables per day. A serving of vegetables is:  ? 1 cup of raw or cooked vegetables.  ? 2 cups of raw leafy greens.  · Eat 4-5 servings of fruit per day. A serving of fruit is:  ? 1 medium whole fruit.  ? ¼ cup of dried fruit.  ? ½ cup of fresh, frozen, or canned fruit.  ? ½ cup of 100% fruit juice.  · Eat more foods that have soluble fiber. These are apples, broccoli, carrots, beans, peas, and barley. Try to get 20-30 g of fiber per day.  · Eat 4-5 servings of nuts, legumes, and seeds per week:  ? 1 serving of dried beans or legumes equals ½ cup after being cooked.  ? 1 serving of nuts is ¼ cup.  ? 1 serving of seeds equals 1 tablespoon.  General information  · Eat more home-cooked food. Eat less restaurant, buffet, and fast food.  · Limit or avoid alcohol.  · Limit foods that are high in starch and sugar.  · Avoid fried foods.  · Lose weight if you are overweight.  · Keep track of how much salt (sodium) you eat. This is important if you have high blood pressure. Ask your doctor to tell you more about this.  · Try to add vegetarian meals each week.  Fats  · Choose healthy fats. These include olive oil and canola oil, flaxseeds, walnuts, almonds, and seeds.  · Eat more omega-3 fats. These include salmon, mackerel, sardines, tuna, flaxseed oil, and ground flaxseeds. Try to eat fish at least 2 times each week.  · Check food labels. Avoid foods with trans fats or high amounts of saturated fat.  · Limit saturated fats.  ? These are often found in animal products, such as meats, butter, and cream.  ? These are also found in plant foods, such as palm oil, palm kernel oil, and coconut oil.  · Avoid foods with partially hydrogenated oils in them. These have trans fats. Examples are stick margarine, some tub margarines, cookies, crackers, and other baked goods.  What foods can I eat?  Fruits  All fresh, canned (in natural juice), or frozen fruits.  Vegetables  Fresh or frozen vegetables (raw, steamed, roasted,  or grilled). Green salads.  Grains  Most grains. Choose whole wheat and whole grains most of the time. Rice and pasta, including brown rice and pastas made with whole wheat.  Meats and other proteins  Lean, well-trimmed beef, veal, pork, and lamb. Chicken and turkey without skin. All fish and shellfish. Wild duck, rabbit, pheasant, and venison. Egg whites or low-cholesterol egg substitutes. Dried beans, peas, lentils, and tofu. Seeds and most nuts.  Dairy  Low-fat or nonfat cheeses, including ricotta and mozzarella. Skim or 1% milk that is liquid, powdered, or evaporated. Buttermilk that is made with low-fat milk. Nonfat or low-fat yogurt.  Fats and oils  Non-hydrogenated (trans-free) margarines. Vegetable oils, including soybean, sesame, sunflower, olive, peanut, safflower, corn, canola, and cottonseed. Salad dressings or mayonnaise made with a vegetable oil.  Beverages  Mineral water. Coffee and tea. Diet carbonated beverages.  Sweets and desserts  Sherbet, gelatin, and fruit ice. Small amounts of dark chocolate.  Limit all sweets and desserts.  Seasonings and condiments  All seasonings and condiments.  The items listed above may not be a complete list of foods and drinks you can eat. Contact a dietitian for more options.  What foods should I avoid?  Fruits  Canned fruit in heavy syrup. Fruit in cream or butter sauce. Fried fruit. Limit coconut.  Vegetables  Vegetables cooked in cheese, cream, or butter sauce. Fried vegetables.  Grains  Breads that are made with saturated or trans fats, oils, or whole milk. Croissants. Sweet rolls. Donuts. High-fat crackers, such as cheese crackers.  Meats and other proteins  Fatty meats, such as hot dogs, ribs, sausage, pimentel, rib-eye roast or steak. High-fat deli meats, such as salami and bologna. Caviar. Domestic duck and goose. Organ meats, such as liver.  Dairy  Cream, sour cream, cream cheese, and creamed cottage cheese. Whole-milk cheeses. Whole or 2% milk that is liquid,  evaporated, or condensed. Whole buttermilk. Cream sauce or high-fat cheese sauce. Yogurt that is made from whole milk.  Fats and oils  Meat fat, or shortening. Cocoa butter, hydrogenated oils, palm oil, coconut oil, palm kernel oil. Solid fats and shortenings, including pimentel fat, salt pork, lard, and butter. Nondairy cream substitutes. Salad dressings with cheese or sour cream.  Beverages  Regular sodas and juice drinks with added sugar.  Sweets and desserts  Frosting. Pudding. Cookies. Cakes. Pies. Milk chocolate or white chocolate. Buttered syrups. Full-fat ice cream or ice cream drinks.  The items listed above may not be a complete list of foods and drinks to avoid. Contact a dietitian for more information.  Summary  · Heart-healthy meal planning includes eating less unhealthy fats, eating more healthy fats, and making other changes in your diet.  · Eat a balanced diet. This includes fruits and vegetables, low-fat or nonfat dairy, lean protein, nuts and legumes, whole grains, and heart-healthy oils and fats.  This information is not intended to replace advice given to you by your health care provider. Make sure you discuss any questions you have with your health care provider.  Document Revised: 02/21/2019 Document Reviewed: 01/25/2019  [employer] Patient Education © 2020 [employer]

## 2021-02-24 NOTE — PROGRESS NOTES
Problem list     Subjective   Clarence Melo is a 70 y.o. male     Chief Complaint   Patient presents with   • Follow-up      Problem list  1. Low risk stress test 2016  1.1 low risk stress test January 2018  2. Preserved systolic function  3. Dyslipidemia  4. Obstructive sleep apnea being treated with CPAP    HPI    Patient is a 70-year-old male that presents back to the office for routine follow-up.  Patient has been doing well.  He has not had any chest pain or pressure.  He can experience mild levels of dyspnea but this is more of a chronic shortness of breath that has not changed or progressed.  He overall has done well.  He does not describe PND, orthopnea or significant edema    He does not palpitate or have dysrhythmic symptoms.  Otherwise he is doing well at this time      Current Outpatient Medications on File Prior to Visit   Medication Sig Dispense Refill   • fexofenadine-pseudoephedrine (ALLEGRA-D 24) 180-240 MG per 24 hr tablet Take 1 tablet by mouth Daily.     • levothyroxine (SYNTHROID, LEVOTHROID) 25 MCG tablet Daily.     • rosuvastatin (CRESTOR) 20 MG tablet Take 20 mg by mouth Daily.     • sildenafil (REVATIO) 20 MG tablet Take 20 mg by mouth. prn     • VITAMIN D PO Take  by mouth Daily.     • CRESTOR 20 MG tablet 20 mg Every Night.     • nitroglycerin (NITROSTAT) 0.4 MG SL tablet 1 under the tongue as needed for angina, may repeat q5mins for up three doses 100 tablet 11     No current facility-administered medications on file prior to visit.        Patient has no known allergies.    Past Medical History:   Diagnosis Date   • Environmental allergies    • Environmental allergies    • Hyperlipidemia    • Sleep apnea     has a c-pap       Social History     Socioeconomic History   • Marital status: Single     Spouse name: Not on file   • Number of children: Not on file   • Years of education: Not on file   • Highest education level: Not on file   Tobacco Use   • Smoking status: Former Smoker      Packs/day: 0.25     Years: 8.00     Pack years: 2.00   • Smokeless tobacco: Never Used   Substance and Sexual Activity   • Alcohol use: Yes     Alcohol/week: 1.0 standard drinks     Types: 1 Cans of beer per week     Comment: socially   • Drug use: No   • Sexual activity: Defer       Family History   Problem Relation Age of Onset   • Dementia Mother    • Heart disease Father    • Hyperlipidemia Father    • Alzheimer's disease Other    • Heart failure Other    • No Known Problems Sister    • No Known Problems Brother    • No Known Problems Maternal Aunt    • No Known Problems Maternal Uncle    • No Known Problems Paternal Aunt    • No Known Problems Paternal Uncle    • No Known Problems Maternal Grandmother    • No Known Problems Maternal Grandfather    • No Known Problems Paternal Grandmother    • No Known Problems Paternal Grandfather    • Anemia Neg Hx    • Arrhythmia Neg Hx    • Asthma Neg Hx    • Clotting disorder Neg Hx    • Fainting Neg Hx    • Heart attack Neg Hx    • Hypertension Neg Hx        Review of Systems   Constitutional: Negative for chills, fatigue and fever.   HENT: Negative.  Negative for congestion, rhinorrhea and sore throat.    Eyes: Positive for visual disturbance (glasses).   Respiratory: Positive for apnea (uses CPAP) and shortness of breath (w/ exertion). Negative for chest tightness.    Cardiovascular: Negative.  Negative for chest pain, palpitations and leg swelling.   Gastrointestinal: Negative.    Endocrine: Negative.    Genitourinary: Negative.    Musculoskeletal: Positive for neck stiffness. Negative for back pain, gait problem and neck pain.   Skin: Negative.  Negative for rash and wound.   Allergic/Immunologic: Negative.  Negative for environmental allergies and food allergies.   Neurological: Positive for dizziness (occas w/ position change) and weakness (occas). Negative for light-headedness, numbness and headaches.   Hematological: Bruises/bleeds easily.   Psychiatric/Behavioral:  "Positive for sleep disturbance (difficulty staying asleep).       Objective   Vitals:    02/24/21 0856   BP: 120/71   BP Location: Left arm   Patient Position: Sitting   Pulse: 86   SpO2: 97%   Weight: 90.9 kg (200 lb 6.4 oz)   Height: 180.3 cm (71\")      /71 (BP Location: Left arm, Patient Position: Sitting)   Pulse 86   Ht 180.3 cm (71\")   Wt 90.9 kg (200 lb 6.4 oz)   SpO2 97%   BMI 27.95 kg/m²     Lab Results (most recent)     None          Physical Exam  Vitals signs and nursing note reviewed.   Constitutional:       General: He is not in acute distress.     Appearance: Normal appearance. He is well-developed.   HENT:      Head: Normocephalic and atraumatic.   Eyes:      General: No scleral icterus.        Right eye: No discharge.         Left eye: No discharge.      Conjunctiva/sclera: Conjunctivae normal.   Neck:      Vascular: No carotid bruit.   Cardiovascular:      Rate and Rhythm: Normal rate and regular rhythm.      Heart sounds: Normal heart sounds. No murmur. No friction rub. No gallop.    Pulmonary:      Effort: Pulmonary effort is normal. No respiratory distress.      Breath sounds: Normal breath sounds. No wheezing or rales.   Chest:      Chest wall: No tenderness.   Musculoskeletal:      Right lower leg: No edema.      Left lower leg: No edema.   Skin:     General: Skin is warm and dry.      Coloration: Skin is not pale.      Findings: No erythema or rash.   Neurological:      Mental Status: He is alert and oriented to person, place, and time.      Cranial Nerves: No cranial nerve deficit.   Psychiatric:         Behavior: Behavior normal.         Procedure   Procedures       Assessment/Plan     Problems Addressed this Visit        Cardiac and Vasculature    Pure hypercholesterolemia    Relevant Medications    rosuvastatin (CRESTOR) 20 MG tablet    Mitral valve insufficiency       Pulmonary and Pneumonias    SOB (shortness of breath) - Primary      Diagnoses       Codes Comments    SOB " (shortness of breath)    -  Primary ICD-10-CM: R06.02  ICD-9-CM: 786.05     Pure hypercholesterolemia     ICD-10-CM: E78.00  ICD-9-CM: 272.0     Mitral valve insufficiency, unspecified etiology     ICD-10-CM: I34.0  ICD-9-CM: 424.0         Recommendation  1.  Patient doing well.  No ischemic or failure symptoms and for now we will continue to monitor    2.  Mild levels of dyspnea at this point.  Patient otherwise is doing remarkably well with no change in symptoms so we will continue to monitor    3.  Patient with mild mitral regurgitation by echo approximately 3 years ago.  He has no clinical findings or physical exam findings to suggest worsening valvular disease    4.  Patient with dyslipidemia currently on statin therapy with labs being monitored by primary.  For now we will see patient back for follow-up in a year or sooner as symptoms discussed.  Follow-up with primary as scheduled             Clarence Melo  reports that he has quit smoking. He has a 2.00 pack-year smoking history. He has never used smokeless tobacco.         Patient's Body mass index is 27.95 kg/m². BMI is above normal parameters. Recommendations include: educational material.       Advance Care Planning   ACP discussion was held with the patient during this visit. Patient does not have an advance directive, declines further assistance.    Electronically signed by:

## 2021-02-26 ENCOUNTER — IMMUNIZATION (OUTPATIENT)
Dept: VACCINE CLINIC | Facility: HOSPITAL | Age: 71
End: 2021-02-26

## 2021-02-26 PROCEDURE — 91300 HC SARSCOV02 VAC 30MCG/0.3ML IM: CPT | Performed by: INTERNAL MEDICINE

## 2021-02-26 PROCEDURE — 0002A: CPT | Performed by: INTERNAL MEDICINE

## 2021-12-20 ENCOUNTER — OFFICE VISIT (OUTPATIENT)
Dept: CARDIOLOGY | Facility: CLINIC | Age: 71
End: 2021-12-20

## 2021-12-20 VITALS
WEIGHT: 192 LBS | SYSTOLIC BLOOD PRESSURE: 151 MMHG | DIASTOLIC BLOOD PRESSURE: 81 MMHG | HEART RATE: 72 BPM | OXYGEN SATURATION: 98 % | BODY MASS INDEX: 26.88 KG/M2 | HEIGHT: 71 IN

## 2021-12-20 DIAGNOSIS — R06.02 SOB (SHORTNESS OF BREATH): Primary | ICD-10-CM

## 2021-12-20 DIAGNOSIS — I10 ESSENTIAL HYPERTENSION: ICD-10-CM

## 2021-12-20 DIAGNOSIS — I48.0 PAROXYSMAL ATRIAL FIBRILLATION (HCC): ICD-10-CM

## 2021-12-20 PROCEDURE — 99214 OFFICE O/P EST MOD 30 MIN: CPT | Performed by: PHYSICIAN ASSISTANT

## 2021-12-20 RX ORDER — NITROGLYCERIN 0.4 MG/1
TABLET SUBLINGUAL
Qty: 25 TABLET | Refills: 1 | Status: SHIPPED | OUTPATIENT
Start: 2021-12-20 | End: 2022-05-26 | Stop reason: HOSPADM

## 2021-12-20 RX ORDER — AMIODARONE HYDROCHLORIDE 200 MG/1
200 TABLET ORAL 3 TIMES DAILY
COMMUNITY
Start: 2021-12-18 | End: 2021-12-20 | Stop reason: SDUPTHER

## 2021-12-20 RX ORDER — FLUTICASONE PROPIONATE 50 MCG
2 SPRAY, SUSPENSION (ML) NASAL DAILY
COMMUNITY
End: 2022-05-20

## 2021-12-20 RX ORDER — AMIODARONE HYDROCHLORIDE 200 MG/1
200 TABLET ORAL DAILY
Qty: 30 TABLET | Refills: 11 | Status: ON HOLD | OUTPATIENT
Start: 2021-12-20 | End: 2022-05-26 | Stop reason: SDUPTHER

## 2021-12-20 NOTE — PROGRESS NOTES
Problem list     Subjective   Clarence Melo is a 71 y.o. male     Chief Complaint   Patient presents with   • Hospital Follow Up Visit   • Atrial Fibrillation     Started Amiodarone Saturday   • Hypertension   Problem list  1. Low risk stress test 2016  1.1 low risk stress test January 2018  2. Preserved systolic function  3. Dyslipidemia  4. Obstructive sleep apnea being treated with CPAP  5.  Atrial fibrillation  5.1 diagnosed during ER visit and hospitalization at [facility] December 2021    HPI    Patient is a 71-year-old male that presents to the office for evaluation.  I saw him in the emergency room on Friday being diagnosed with new onset atrial fibrillation.  Patient apparently went to get labs performed at [facility] and was found to be in A. fib with rapid ventricular response.  He presented to the emergency room.    There, patient would frequently transition from A. fib to 150s to sinus rhythm and then back to A. fib/flutter at times.  Patient was placed on IV amiodarone bolus and drip.  Labs otherwise were unremarkable.  Patient was completely asymptomatic.  He has felt fatigued lately and has been mildly dyspneic.  However, he did not sense any A. fib at all    Patient was admitted overnight and transition to oral amiodarone which he is taking 3 times a day    He has no chest pain or pressure but is mildly dyspneic.  Patient has been quite active and quite health-conscious.  Patient walks extensively with cardiovascular exercise and has no chest discomfort at all.  His dyspnea is mild but not severe.  No PND orthopnea.    He does not describe palpitating but again did not sense that when in rapid ventricular response.  No strokelike symptoms no complaints of dizziness, presyncope or syncope and otherwise appears stable        Current Outpatient Medications on File Prior to Visit   Medication Sig Dispense Refill   • CRESTOR 20 MG tablet 20 mg Every Night.     •  fexofenadine-pseudoephedrine (ALLEGRA-D 24) 180-240 MG per 24 hr tablet Take 1 tablet by mouth Daily.     • fluticasone (FLONASE) 50 MCG/ACT nasal spray 2 sprays into the nostril(s) as directed by provider Daily.     • levothyroxine (SYNTHROID, LEVOTHROID) 25 MCG tablet Daily.     • sildenafil (REVATIO) 20 MG tablet Take 20 mg by mouth. prn     • VITAMIN D PO Take  by mouth Daily. Occasionally takes     • [DISCONTINUED] amiodarone (PACERONE) 200 MG tablet Take 200 mg by mouth 3 (Three) Times a Day.     • [DISCONTINUED] nitroglycerin (NITROSTAT) 0.4 MG SL tablet 1 under the tongue as needed for angina, may repeat q5mins for up three doses 100 tablet 11   • [DISCONTINUED] rosuvastatin (CRESTOR) 20 MG tablet Take 20 mg by mouth Daily.       No current facility-administered medications on file prior to visit.       Patient has no known allergies.    Past Medical History:   Diagnosis Date   • Atrial fibrillation (HCC)    • Environmental allergies    • Environmental allergies    • Hyperlipidemia    • Hypertension    • Sleep apnea     has a c-pap       Social History     Socioeconomic History   • Marital status: Single   Tobacco Use   • Smoking status: Former Smoker     Packs/day: 0.25     Years: 8.00     Pack years: 2.00     Types: Cigarettes   • Smokeless tobacco: Never Used   Substance and Sexual Activity   • Alcohol use: Yes     Alcohol/week: 1.0 standard drink     Types: 1 Cans of beer per week     Comment: socially   • Drug use: No   • Sexual activity: Defer       Family History   Problem Relation Age of Onset   • Dementia Mother    • Heart disease Father    • Hyperlipidemia Father    • Alzheimer's disease Other    • Heart failure Other    • No Known Problems Sister    • No Known Problems Brother    • No Known Problems Maternal Aunt    • No Known Problems Maternal Uncle    • No Known Problems Paternal Aunt    • No Known Problems Paternal Uncle    • No Known Problems Maternal Grandmother    • No Known Problems  "Maternal Grandfather    • No Known Problems Paternal Grandmother    • No Known Problems Paternal Grandfather    • Anemia Neg Hx    • Arrhythmia Neg Hx    • Asthma Neg Hx    • Clotting disorder Neg Hx    • Fainting Neg Hx    • Heart attack Neg Hx    • Hypertension Neg Hx        Review of Systems   Constitutional: Negative.  Negative for chills and fever.   Respiratory: Positive for apnea and shortness of breath (at times). Negative for chest tightness.    Cardiovascular: Negative.  Negative for chest pain, palpitations and leg swelling.   Gastrointestinal: Negative.  Negative for abdominal pain, blood in stool, constipation, diarrhea, nausea and vomiting.   Genitourinary: Negative.  Negative for dysuria, frequency, hematuria and urgency.   Musculoskeletal: Negative.  Negative for back pain and neck pain.   Neurological: Positive for dizziness (at times when up moving around). Negative for syncope and light-headedness.   Psychiatric/Behavioral: Positive for sleep disturbance (cpap, denies waking with soa or cp).       Objective   Vitals:    12/20/21 1015   BP: 151/81   BP Location: Left arm   Patient Position: Sitting   Pulse: 72   SpO2: 98%   Weight: 87.1 kg (192 lb)   Height: 180.3 cm (71\")      /81 (BP Location: Left arm, Patient Position: Sitting)   Pulse 72   Ht 180.3 cm (71\")   Wt 87.1 kg (192 lb)   SpO2 98%   BMI 26.78 kg/m²     Lab Results (most recent)     None          Physical Exam  Vitals and nursing note reviewed.   Constitutional:       General: He is not in acute distress.     Appearance: Normal appearance. He is well-developed.   HENT:      Head: Normocephalic and atraumatic.   Eyes:      General: No scleral icterus.        Right eye: No discharge.         Left eye: No discharge.      Conjunctiva/sclera: Conjunctivae normal.   Neck:      Vascular: No carotid bruit.   Cardiovascular:      Rate and Rhythm: Normal rate and regular rhythm.      Heart sounds: Normal heart sounds. No murmur " heard.  No friction rub. No gallop.    Pulmonary:      Effort: Pulmonary effort is normal. No respiratory distress.      Breath sounds: Normal breath sounds. No wheezing or rales.   Chest:      Chest wall: No tenderness.   Musculoskeletal:      Right lower leg: No edema.      Left lower leg: No edema.   Skin:     General: Skin is warm and dry.      Coloration: Skin is not pale.      Findings: No erythema or rash.   Neurological:      Mental Status: He is alert and oriented to person, place, and time.      Cranial Nerves: No cranial nerve deficit.   Psychiatric:         Behavior: Behavior normal.         Procedure   Procedures       Assessment/Plan     Problems Addressed this Visit        Cardiac and Vasculature    Essential hypertension    Relevant Orders    Adult Transthoracic Echo Complete W/ Cont if Necessary Per Protocol    Cardiac Event Monitor    Paroxysmal atrial fibrillation (HCC)    Relevant Medications    nitroglycerin (NITROSTAT) 0.4 MG SL tablet    Other Relevant Orders    Adult Transthoracic Echo Complete W/ Cont if Necessary Per Protocol    Cardiac Event Monitor       Pulmonary and Pneumonias    SOB (shortness of breath) - Primary    Relevant Orders    Adult Transthoracic Echo Complete W/ Cont if Necessary Per Protocol    Cardiac Event Monitor      Diagnoses       Codes Comments    SOB (shortness of breath)    -  Primary ICD-10-CM: R06.02  ICD-9-CM: 786.05     Paroxysmal atrial fibrillation (HCC)     ICD-10-CM: I48.0  ICD-9-CM: 427.31     Essential hypertension     ICD-10-CM: I10  ICD-9-CM: 401.9             Recommendation  1.  Patient is a 71-year-old male with new onset paroxysmal atrial fibrillation currently in sinus on oral amiodarone.  We discussed the risk of stroke and he is agreeable to consider anticoagulation.  We are prescribing Eliquis 5 mg to take twice a day.  We discussed risk of bleeding and patient acknowledges.  If he experiences any symptoms of bleeding, he is to call our office.   After 1 week, he is to go to 200 mg once daily of amiodarone    2.  Patient with baseline hypertension doing remarkably well at this point we will continue current medical regimen    3.  We will schedule echocardiogram because of patient's mild dyspnea.  I would also like to schedule event monitor as he complains of dizziness at times.  I would like to ensure that he is not having pauses or more rapid ventricular response rates.  I discussed with him to follow-up with primary in regards to possible inner ear issues or intracranial issues that might be causing his dizziness.  He does not appear to be orthostatic by his description.    4.  We will evaluate from a cardiac standpoint see him back for follow-up.  He is to follow with primary as scheduled         Patient did not bring med list or medicine bottles to appointment, med list has been reviewed and updated based on patient's knowledge of their meds. \    Advance Care Planning   ACP discussion was held with the patient during this visit. Patient does not have an advance directive, declines further assistance.         Electronically signed by:

## 2021-12-20 NOTE — PATIENT INSTRUCTIONS

## 2021-12-27 ENCOUNTER — TELEPHONE (OUTPATIENT)
Dept: CARDIOLOGY | Facility: CLINIC | Age: 71
End: 2021-12-27

## 2021-12-27 NOTE — TELEPHONE ENCOUNTER
Spoke to Leandro Luz PA - Nurse visit for 1/4/22 @ 3:45 pm     Patient needs to monitor BP if it is running 130/80 he needs to start Metoprolol 25 mg 1 daily. If running lower patient can hold an we can discuss at nurse visit       Spoke with patients wife she verbalized understanding    AT [facility]           ___________________________________    Received critical monitor report  From [employer]       Max  lowest 100 mostly in the evening     Patient recently started on Amiodarone 200 mg daily.       Spoke to wife - she started they have 2 dogs and patient is out in the evening walking and playing with.       NO CHEST PAIN / TIGHTNESS / PALPITATIONS    VM was left for provider on above note     AT [facility]       OFFICE IS CLOSED FOR HANSEL BREAK THIS WEEK /PHONES ARE OFF

## 2022-01-04 ENCOUNTER — CLINICAL SUPPORT (OUTPATIENT)
Dept: CARDIOLOGY | Facility: CLINIC | Age: 72
End: 2022-01-04

## 2022-01-04 VITALS
OXYGEN SATURATION: 97 % | HEIGHT: 71 IN | DIASTOLIC BLOOD PRESSURE: 81 MMHG | BODY MASS INDEX: 26.74 KG/M2 | HEART RATE: 144 BPM | SYSTOLIC BLOOD PRESSURE: 137 MMHG | WEIGHT: 191 LBS

## 2022-01-04 DIAGNOSIS — I48.0 PAROXYSMAL ATRIAL FIBRILLATION: Primary | ICD-10-CM

## 2022-01-04 PROCEDURE — 93000 ELECTROCARDIOGRAM COMPLETE: CPT | Performed by: PHYSICIAN ASSISTANT

## 2022-01-04 NOTE — PROGRESS NOTES
Clarence Melo  1950  1/4/2022   ?   Chief Complaint   Patient presents with   • Atrial Fibrillation     EKG   • Shortness of Breath   • Rapid Heart Rate      ?   HPI:   ?   ? Patient is here for A Fib he is having severe fatigue. He has been unable to exercise. He is not having palpitations,can not feel elevation in heart rate . He does have SOB but states that he has that all the time.  ?     Current Outpatient Medications:   •  amiodarone (PACERONE) 200 MG tablet, Take 1 tablet by mouth Daily., Disp: 30 tablet, Rfl: 11  •  apixaban (ELIQUIS) 5 MG tablet tablet, Take 1 tablet by mouth 2 (Two) Times a Day., Disp: 60 tablet, Rfl: 11  •  CRESTOR 20 MG tablet, 20 mg Every Night., Disp: , Rfl:   •  fexofenadine-pseudoephedrine (ALLEGRA-D 24) 180-240 MG per 24 hr tablet, Take 1 tablet by mouth Daily., Disp: , Rfl:   •  fluticasone (FLONASE) 50 MCG/ACT nasal spray, 2 sprays into the nostril(s) as directed by provider Daily., Disp: , Rfl:   •  levothyroxine (SYNTHROID, LEVOTHROID) 25 MCG tablet, Daily., Disp: , Rfl:   •  metoprolol tartrate (LOPRESSOR) 25 MG tablet, Take 1 tablet by mouth Daily., Disp: 30 tablet, Rfl: 1  •  nitroglycerin (NITROSTAT) 0.4 MG SL tablet, 1 under the tongue as needed for angina, may repeat q5mins for up three doses, Disp: 25 tablet, Rfl: 1  •  sildenafil (REVATIO) 20 MG tablet, Take 20 mg by mouth. prn, Disp: , Rfl:   •  VITAMIN D PO, Take  by mouth Daily. Occasionally takes, Disp: , Rfl:    ?   ?   Patient has no known allergies.       Procedures     ?   Assessment/Plan    ? A Fib   Patient chart and EKG was reviewed by Leandro HENDRICKS . Scheduled for Cardioversion. Patient will return post testing. Patient verbalized understanding of above.  ?   ?

## 2022-01-05 ENCOUNTER — TELEPHONE (OUTPATIENT)
Dept: CARDIOLOGY | Facility: CLINIC | Age: 72
End: 2022-01-05

## 2022-01-05 ENCOUNTER — LAB (OUTPATIENT)
Dept: LAB | Facility: HOSPITAL | Age: 72
End: 2022-01-05

## 2022-01-05 DIAGNOSIS — Z01.810 PRE-PROCEDURAL CARDIOVASCULAR EXAMINATION: Primary | ICD-10-CM

## 2022-01-05 DIAGNOSIS — I48.0 PAROXYSMAL ATRIAL FIBRILLATION: Primary | ICD-10-CM

## 2022-01-05 DIAGNOSIS — Z01.810 PRE-PROCEDURAL CARDIOVASCULAR EXAMINATION: ICD-10-CM

## 2022-01-05 LAB — SARS-COV-2 RNA RESP QL NAA+PROBE: NOT DETECTED

## 2022-01-05 PROCEDURE — C9803 HOPD COVID-19 SPEC COLLECT: HCPCS | Performed by: PHYSICIAN ASSISTANT

## 2022-01-05 PROCEDURE — U0003 INFECTIOUS AGENT DETECTION BY NUCLEIC ACID (DNA OR RNA); SEVERE ACUTE RESPIRATORY SYNDROME CORONAVIRUS 2 (SARS-COV-2) (CORONAVIRUS DISEASE [COVID-19]), AMPLIFIED PROBE TECHNIQUE, MAKING USE OF HIGH THROUGHPUT TECHNOLOGIES AS DESCRIBED BY CMS-2020-01-R: HCPCS | Performed by: PHYSICIAN ASSISTANT

## 2022-01-07 ENCOUNTER — HOSPITAL ENCOUNTER (OUTPATIENT)
Dept: CARDIOLOGY | Facility: HOSPITAL | Age: 72
End: 2022-01-07

## 2022-01-10 ENCOUNTER — TELEPHONE (OUTPATIENT)
Dept: CARDIOLOGY | Facility: CLINIC | Age: 72
End: 2022-01-10

## 2022-01-10 DIAGNOSIS — Z01.818 ENCOUNTER FOR OTHER PREPROCEDURAL EXAMINATION: Primary | ICD-10-CM

## 2022-01-10 NOTE — TELEPHONE ENCOUNTER
Cardioversion not done related to Dr Park unable to make it to [place] due to weather. Patient informed in process of being rescheduled by Josee BEJARANO/MA. Patient verbalized understanding. Mallorie Chong LPN      ----- Message from ELADIO Carrion sent at 1/10/2022  8:47 AM EST -----  Did patient undergo cardioversion?    Mobile Cardiac Outpatient Telemetry  Order: [id number]   Status: Final result     Visible to patient: No (inaccessible in MyChart)     Dx: Paroxysmal atrial fibrillation (HCC);...     1 Result Note    Details    Reading Physician Reading Date Result Priority   John Paul Nieto MD  [phone] 1/8/2022 Routine     Result Text  · Study findings 1. Paroxysmal atrial fibrillation and flutter identified during study with frequent rapid ventricular response rates with rates that were frequent in the 140s beats per minute 2. No symptoms were reported. No ectopy or block noted.

## 2022-01-11 ENCOUNTER — CLINICAL SUPPORT (OUTPATIENT)
Dept: FAMILY MEDICINE CLINIC | Facility: CLINIC | Age: 72
End: 2022-01-11

## 2022-01-11 DIAGNOSIS — Z01.818 ENCOUNTER FOR OTHER PREPROCEDURAL EXAMINATION: ICD-10-CM

## 2022-01-11 PROCEDURE — U0004 COV-19 TEST NON-CDC HGH THRU: HCPCS | Performed by: NURSE PRACTITIONER

## 2022-01-12 LAB — SARS-COV-2 RNA PNL SPEC NAA+PROBE: NOT DETECTED

## 2022-01-13 ENCOUNTER — ANESTHESIA EVENT (OUTPATIENT)
Dept: CARDIOLOGY | Facility: HOSPITAL | Age: 72
End: 2022-01-13

## 2022-01-13 ENCOUNTER — ANESTHESIA (OUTPATIENT)
Dept: CARDIOLOGY | Facility: HOSPITAL | Age: 72
End: 2022-01-13

## 2022-01-13 ENCOUNTER — HOSPITAL ENCOUNTER (OUTPATIENT)
Dept: CARDIOLOGY | Facility: HOSPITAL | Age: 72
Discharge: HOME | End: 2022-01-13
Admitting: INTERNAL MEDICINE

## 2022-01-13 VITALS — RESPIRATION RATE: 18 BRPM | OXYGEN SATURATION: 99 % | HEART RATE: 74 BPM

## 2022-01-13 DIAGNOSIS — I48.0 PAROXYSMAL ATRIAL FIBRILLATION: ICD-10-CM

## 2022-01-13 PROCEDURE — 93010 ELECTROCARDIOGRAM REPORT: CPT | Performed by: INTERNAL MEDICINE

## 2022-01-13 PROCEDURE — 93005 ELECTROCARDIOGRAM TRACING: CPT | Performed by: INTERNAL MEDICINE

## 2022-01-13 PROCEDURE — G0463 HOSPITAL OUTPT CLINIC VISIT: HCPCS

## 2022-01-13 RX ORDER — SODIUM CHLORIDE 0.9 % (FLUSH) 0.9 %
10 SYRINGE (ML) INJECTION AS NEEDED
Status: CANCELLED | OUTPATIENT
Start: 2022-01-27

## 2022-01-13 RX ORDER — SODIUM CHLORIDE, SODIUM LACTATE, POTASSIUM CHLORIDE, CALCIUM CHLORIDE 600; 310; 30; 20 MG/100ML; MG/100ML; MG/100ML; MG/100ML
125 INJECTION, SOLUTION INTRAVENOUS ONCE
Status: CANCELLED | OUTPATIENT
Start: 2022-01-27 | End: 2022-01-13

## 2022-01-13 RX ORDER — MIDAZOLAM HYDROCHLORIDE 1 MG/ML
0.5 INJECTION INTRAMUSCULAR; INTRAVENOUS
Status: CANCELLED | OUTPATIENT
Start: 2022-01-27

## 2022-01-13 RX ORDER — SODIUM CHLORIDE 0.9 % (FLUSH) 0.9 %
10 SYRINGE (ML) INJECTION EVERY 12 HOURS SCHEDULED
Status: CANCELLED | OUTPATIENT
Start: 2022-01-27

## 2022-01-13 NOTE — PROGRESS NOTES
Mr. Melo was scheduled for an elective external electrical cardioversion today by Leandro Luz PA-C from [place].  Patient apparently has history of atrial fibrillation.  However when he was hooked up to the monitor, he was noted to be in sinus rhythm.  Hence the procedure was canceled and patient was advised to follow-up with his primary cardiologist as scheduled.  Patient was asked to continue with his anticoagulation as prescribed.  Patient expressed understanding    Ken Neff MD [facility]  1/13/2022

## 2022-01-13 NOTE — ANESTHESIA PREPROCEDURE EVALUATION
Anesthesia Evaluation     Patient summary reviewed and Nursing notes reviewed   no history of anesthetic complications:  NPO Solid Status: > 8 hours  NPO Liquid Status: > 8 hours           Airway   Mallampati: II  TM distance: >3 FB  Neck ROM: full  Dental - normal exam     Pulmonary - normal exam   (+) shortness of breath, sleep apnea,   Cardiovascular     ECG reviewed  Rhythm: irregular    (+) hypertension, valvular problems/murmurs MS, dysrhythmias Atrial Fib, hyperlipidemia,       Neuro/Psych- negative ROS  GI/Hepatic/Renal/Endo - negative ROS     Musculoskeletal     Abdominal  - normal exam   Substance History - negative use     OB/GYN negative ob/gyn ROS         Other - negative ROS                       Anesthesia Plan    ASA 4     general     intravenous induction     Anesthetic plan, all risks, benefits, and alternatives have been provided, discussed and informed consent has been obtained with: patient.  Use of blood products discussed with patient  Consented to blood products.

## 2022-01-14 LAB
QT INTERVAL: 432 MS
QTC INTERVAL: 456 MS

## 2022-01-19 ENCOUNTER — TELEPHONE (OUTPATIENT)
Dept: CARDIOLOGY | Facility: CLINIC | Age: 72
End: 2022-01-19

## 2022-01-19 NOTE — TELEPHONE ENCOUNTER
"Spoke with pt, states went for cardioversion and they ended up not doing precedure \"Says everything was fine, and just didn't do it, what is the plan from here?.\" I advised him Leandro was gone for the day, would get back with him with more info when he is back in office. Verbally understands.  "

## 2022-01-20 NOTE — TELEPHONE ENCOUNTER
Pt notified to me pulse is ranging from , states it has gotten up to 130 at times with no activity.     Can we please schedule nurse visit EKG monday?

## 2022-01-20 NOTE — TELEPHONE ENCOUNTER
I think patient needs a cardioversion because of his A. fib and his heart rate.  He can come in for nurse visit, not today, like on Monday and repeat an EKG and see if he is still out of rhythm.  Can you ask him what his heart rates have been at home

## 2022-01-24 ENCOUNTER — CLINICAL SUPPORT (OUTPATIENT)
Dept: CARDIOLOGY | Facility: CLINIC | Age: 72
End: 2022-01-24

## 2022-01-24 VITALS
SYSTOLIC BLOOD PRESSURE: 121 MMHG | BODY MASS INDEX: 27.8 KG/M2 | DIASTOLIC BLOOD PRESSURE: 71 MMHG | HEIGHT: 71 IN | OXYGEN SATURATION: 98 % | HEART RATE: 71 BPM | WEIGHT: 198.6 LBS

## 2022-01-24 DIAGNOSIS — I48.0 PAROXYSMAL ATRIAL FIBRILLATION: Primary | ICD-10-CM

## 2022-01-24 PROCEDURE — 99211 OFF/OP EST MAY X REQ PHY/QHP: CPT | Performed by: NURSE PRACTITIONER

## 2022-01-24 NOTE — PROGRESS NOTES
Clarence Melo  1950  1/24/2022   ?   Chief Complaint   Patient presents with   • Atrial Fibrillation     EKG went for Cardioversion was not in Afib did not do      ?   HPI:   ?Patient presents today for EKG  Today. He went for cardioversion and they could not do it he was in Normal Sinus. Patient is feeling some better. He is still not where he wants to be . He has had heart rate at highest 130-135.   ?   ?     Current Outpatient Medications:   •  amiodarone (PACERONE) 200 MG tablet, Take 1 tablet by mouth Daily., Disp: 30 tablet, Rfl: 11  •  apixaban (ELIQUIS) 5 MG tablet tablet, Take 1 tablet by mouth 2 (Two) Times a Day., Disp: 60 tablet, Rfl: 11  •  CRESTOR 20 MG tablet, 20 mg Every Night., Disp: , Rfl:   •  fexofenadine-pseudoephedrine (ALLEGRA-D 24) 180-240 MG per 24 hr tablet, Take 1 tablet by mouth Daily., Disp: , Rfl:   •  fluticasone (FLONASE) 50 MCG/ACT nasal spray, 2 sprays into the nostril(s) as directed by provider Daily., Disp: , Rfl:   •  levothyroxine (SYNTHROID, LEVOTHROID) 25 MCG tablet, Daily., Disp: , Rfl:   •  metoprolol tartrate (LOPRESSOR) 25 MG tablet, Take 1 tablet by mouth Daily., Disp: 30 tablet, Rfl: 1  •  nitroglycerin (NITROSTAT) 0.4 MG SL tablet, 1 under the tongue as needed for angina, may repeat q5mins for up three doses, Disp: 25 tablet, Rfl: 1  •  sildenafil (REVATIO) 20 MG tablet, Take 20 mg by mouth. prn, Disp: , Rfl:   •  VITAMIN D PO, Take  by mouth Daily. Occasionally takes, Disp: , Rfl:    ?   ?   Patient has no known allergies.       Procedures     ?   Assessment/Plan    ? A Fib   Chart reviewed by Bill Kaba NP. EKG is showing Normal Sinus. Patient  Is to keep regular scheduled apts. He is to call with any worsening symptoms. Patient verbalized understanding of above.  ?   ?

## 2022-01-27 ENCOUNTER — HOSPITAL ENCOUNTER (OUTPATIENT)
Dept: CARDIOLOGY | Facility: HOSPITAL | Age: 72
Discharge: HOME | End: 2022-01-27
Admitting: PHYSICIAN ASSISTANT

## 2022-01-27 DIAGNOSIS — I10 ESSENTIAL HYPERTENSION: ICD-10-CM

## 2022-01-27 DIAGNOSIS — R06.02 SOB (SHORTNESS OF BREATH): ICD-10-CM

## 2022-01-27 DIAGNOSIS — I48.0 PAROXYSMAL ATRIAL FIBRILLATION: ICD-10-CM

## 2022-01-27 PROCEDURE — 93306 TTE W/DOPPLER COMPLETE: CPT

## 2022-01-27 PROCEDURE — 93306 TTE W/DOPPLER COMPLETE: CPT | Performed by: INTERNAL MEDICINE

## 2022-01-30 LAB
BH CV ECHO MEAS - ACS: 2.3 CM
BH CV ECHO MEAS - AO MAX PG: 7 MMHG
BH CV ECHO MEAS - AO MEAN PG: 4 MMHG
BH CV ECHO MEAS - AO ROOT AREA (BSA CORRECTED): 1.4
BH CV ECHO MEAS - AO ROOT AREA: 6.4 CM^2
BH CV ECHO MEAS - AO ROOT DIAM: 2.9 CM
BH CV ECHO MEAS - AO V2 MAX: 132 CM/SEC
BH CV ECHO MEAS - AO V2 MEAN: 91.3 CM/SEC
BH CV ECHO MEAS - AO V2 VTI: 28 CM
BH CV ECHO MEAS - BSA(HAYCOCK): 2.1 M^2
BH CV ECHO MEAS - BSA: 2.1 M^2
BH CV ECHO MEAS - BZI_BMI: 27.6 KILOGRAMS/M^2
BH CV ECHO MEAS - BZI_METRIC_HEIGHT: 180.3 CM
BH CV ECHO MEAS - BZI_METRIC_WEIGHT: 89.8 KG
BH CV ECHO MEAS - EDV(CUBED): 136.6 ML
BH CV ECHO MEAS - EDV(MOD-SP4): 140 ML
BH CV ECHO MEAS - EDV(TEICH): 126.6 ML
BH CV ECHO MEAS - EF(CUBED): 74.6 %
BH CV ECHO MEAS - EF(MOD-SP4): 57.5 %
BH CV ECHO MEAS - EF(TEICH): 66.2 %
BH CV ECHO MEAS - ESV(CUBED): 34.6 ML
BH CV ECHO MEAS - ESV(MOD-SP4): 59.5 ML
BH CV ECHO MEAS - ESV(TEICH): 42.8 ML
BH CV ECHO MEAS - FS: 36.7 %
BH CV ECHO MEAS - IVS/LVPW: 1
BH CV ECHO MEAS - IVSD: 0.84 CM
BH CV ECHO MEAS - LA DIMENSION: 4.1 CM
BH CV ECHO MEAS - LA/AO: 1.4
BH CV ECHO MEAS - LV DIASTOLIC VOL/BSA (35-75): 66.7 ML/M^2
BH CV ECHO MEAS - LV IVRT: 0.12 SEC
BH CV ECHO MEAS - LV MASS(C)D: 152.7 GRAMS
BH CV ECHO MEAS - LV MASS(C)DI: 72.7 GRAMS/M^2
BH CV ECHO MEAS - LV SYSTOLIC VOL/BSA (12-30): 28.3 ML/M^2
BH CV ECHO MEAS - LVIDD: 5.2 CM
BH CV ECHO MEAS - LVIDS: 3.3 CM
BH CV ECHO MEAS - LVLD AP4: 7.8 CM
BH CV ECHO MEAS - LVLS AP4: 6.4 CM
BH CV ECHO MEAS - LVOT AREA (M): 4.2 CM^2
BH CV ECHO MEAS - LVOT AREA: 4.2 CM^2
BH CV ECHO MEAS - LVOT DIAM: 2.3 CM
BH CV ECHO MEAS - LVPWD: 0.84 CM
BH CV ECHO MEAS - MV A MAX VEL: 53.6 CM/SEC
BH CV ECHO MEAS - MV DEC SLOPE: 611 CM/SEC^2
BH CV ECHO MEAS - MV E MAX VEL: 117 CM/SEC
BH CV ECHO MEAS - MV E/A: 2.2
BH CV ECHO MEAS - RAP SYSTOLE: 10 MMHG
BH CV ECHO MEAS - RVDD: 3 CM
BH CV ECHO MEAS - RVSP: 22.3 MMHG
BH CV ECHO MEAS - SI(AO): 85.1 ML/M^2
BH CV ECHO MEAS - SI(CUBED): 48.5 ML/M^2
BH CV ECHO MEAS - SI(MOD-SP4): 38.3 ML/M^2
BH CV ECHO MEAS - SI(TEICH): 39.9 ML/M^2
BH CV ECHO MEAS - SV(AO): 178.6 ML
BH CV ECHO MEAS - SV(CUBED): 101.9 ML
BH CV ECHO MEAS - SV(MOD-SP4): 80.5 ML
BH CV ECHO MEAS - SV(TEICH): 83.8 ML
BH CV ECHO MEAS - TR MAX VEL: 175 CM/SEC

## 2022-02-09 ENCOUNTER — TELEPHONE (OUTPATIENT)
Dept: CARDIOLOGY | Facility: CLINIC | Age: 72
End: 2022-02-09

## 2022-02-09 NOTE — TELEPHONE ENCOUNTER
Pt called and wanted to let us know he tested positive for Covid x 3 days prior, he is doing okay just concerned about upcoming stephen. I advised if he felt okay and was able to, he is okay to continue stephen once released to do so.

## 2022-02-24 ENCOUNTER — OFFICE VISIT (OUTPATIENT)
Dept: CARDIOLOGY | Facility: CLINIC | Age: 72
End: 2022-02-24

## 2022-02-24 VITALS
DIASTOLIC BLOOD PRESSURE: 76 MMHG | WEIGHT: 192 LBS | HEART RATE: 73 BPM | HEIGHT: 71 IN | OXYGEN SATURATION: 98 % | BODY MASS INDEX: 26.88 KG/M2 | SYSTOLIC BLOOD PRESSURE: 125 MMHG

## 2022-02-24 DIAGNOSIS — I34.0 MITRAL VALVE INSUFFICIENCY, UNSPECIFIED ETIOLOGY: ICD-10-CM

## 2022-02-24 DIAGNOSIS — I10 ESSENTIAL HYPERTENSION: ICD-10-CM

## 2022-02-24 DIAGNOSIS — R06.02 SOB (SHORTNESS OF BREATH): ICD-10-CM

## 2022-02-24 DIAGNOSIS — R53.82 CHRONIC FATIGUE: ICD-10-CM

## 2022-02-24 DIAGNOSIS — R00.2 PALPITATIONS: ICD-10-CM

## 2022-02-24 DIAGNOSIS — I48.0 PAROXYSMAL ATRIAL FIBRILLATION: ICD-10-CM

## 2022-02-24 DIAGNOSIS — E78.2 MIXED HYPERLIPIDEMIA: Primary | ICD-10-CM

## 2022-02-24 DIAGNOSIS — R06.02 SOB (SHORTNESS OF BREATH) ON EXERTION: ICD-10-CM

## 2022-02-24 DIAGNOSIS — R07.2 PRECORDIAL PAIN: ICD-10-CM

## 2022-02-24 PROBLEM — R53.83 FATIGUE: Status: RESOLVED | Noted: 2018-01-29 | Resolved: 2022-02-24

## 2022-02-24 PROBLEM — E78.00 PURE HYPERCHOLESTEROLEMIA: Status: RESOLVED | Noted: 2018-02-19 | Resolved: 2022-02-24

## 2022-02-24 PROCEDURE — 99213 OFFICE O/P EST LOW 20 MIN: CPT | Performed by: PHYSICIAN ASSISTANT

## 2022-02-24 RX ORDER — ALBUTEROL SULFATE 90 UG/1
AEROSOL, METERED RESPIRATORY (INHALATION)
COMMUNITY
Start: 2022-02-02 | End: 2022-05-20 | Stop reason: ALTCHOICE

## 2022-02-24 RX ORDER — FEXOFENADINE HYDROCHLORIDE AND PSEUDOEPHEDRINE HYDROCHLORIDE 60; 120 MG/1; MG/1
1 TABLET, FILM COATED, EXTENDED RELEASE ORAL AS NEEDED
COMMUNITY
Start: 2022-02-22 | End: 2022-06-09

## 2022-02-24 RX ORDER — TADALAFIL 5 MG/1
5 TABLET ORAL DAILY
COMMUNITY
End: 2022-05-20 | Stop reason: ALTCHOICE

## 2022-02-24 NOTE — PROGRESS NOTES
Problem list:    1. Low risk stress test 2016  1.1 low risk stress test January 2018  2. Preserved systolic function  3. Dyslipidemia  4. Obstructive sleep apnea being treated with CPAP  5.  Atrial fibrillation  5.1 diagnosed during ER visit and hospitalization at [facility] December 2021    Subjective   Clarence Melo is a 71 y.o. male     Chief Complaint   Patient presents with   • Follow-up     Here for yearly f/u   • Hyperlipidemia   • Hypertension   • Palpitations   • Atrial Fibrillation       HPI    Patient is a 71-year-old male who presents back to the office for evaluation.  Patient has a history as above with atrial fibrillation currently on amiodarone therapy.    He was scheduled to undergo electrocardioversion but apparently restored to sinus rhythm    .  He has doing remarkably well.  He has no chest pain or pressure.  Very mild levels of dyspnea without progression.  No PND orthopnea.    He does not describe any significant palpitations.  He does not describe dizziness, presyncope or syncope.  Otherwise patient is doing well                  Current Outpatient Medications on File Prior to Visit   Medication Sig Dispense Refill   • albuterol sulfate  (90 Base) MCG/ACT inhaler prn     • Allegra-D Allergy & Congestion  MG per 12 hr tablet TAKE ONE TABLET BY MOUTH ONCE OR TWICE DAILY AS NEEDED     • amiodarone (PACERONE) 200 MG tablet Take 1 tablet by mouth Daily. 30 tablet 11   • apixaban (ELIQUIS) 5 MG tablet tablet Take 1 tablet by mouth 2 (Two) Times a Day. 60 tablet 11   • CRESTOR 20 MG tablet 20 mg Every Night.     • fluticasone (FLONASE) 50 MCG/ACT nasal spray 2 sprays into the nostril(s) as directed by provider Daily.     • levothyroxine (SYNTHROID, LEVOTHROID) 25 MCG tablet Daily.     • metoprolol tartrate (LOPRESSOR) 25 MG tablet Take 1 tablet by mouth Daily. 30 tablet 1   • tadalafil (CIALIS) 5 MG tablet Take 5 mg by mouth Daily.     • VITAMIN D PO Take  by mouth  Daily. Occasionally takes     • nitroglycerin (NITROSTAT) 0.4 MG SL tablet 1 under the tongue as needed for angina, may repeat q5mins for up three doses 25 tablet 1     No current facility-administered medications on file prior to visit.       Patient has no known allergies.    Past Medical History:   Diagnosis Date   • Atrial fibrillation (HCC)    • Environmental allergies    • Environmental allergies    • Hyperlipidemia    • Hypertension    • Sleep apnea     has a c-pap       Social History     Socioeconomic History   • Marital status: Single   Tobacco Use   • Smoking status: Former Smoker     Packs/day: 0.25     Years: 8.00     Pack years: 2.00     Types: Cigarettes   • Smokeless tobacco: Never Used   Substance and Sexual Activity   • Alcohol use: Yes     Alcohol/week: 1.0 standard drink     Types: 1 Cans of beer per week     Comment: socially   • Drug use: No   • Sexual activity: Defer       Family History   Problem Relation Age of Onset   • Dementia Mother    • Heart disease Father    • Hyperlipidemia Father    • Alzheimer's disease Other    • Heart failure Other    • No Known Problems Sister    • No Known Problems Brother    • No Known Problems Maternal Aunt    • No Known Problems Maternal Uncle    • No Known Problems Paternal Aunt    • No Known Problems Paternal Uncle    • No Known Problems Maternal Grandmother    • No Known Problems Maternal Grandfather    • No Known Problems Paternal Grandmother    • No Known Problems Paternal Grandfather    • Anemia Neg Hx    • Arrhythmia Neg Hx    • Asthma Neg Hx    • Clotting disorder Neg Hx    • Fainting Neg Hx    • Heart attack Neg Hx    • Hypertension Neg Hx        Review of Systems   Constitutional: Positive for fatigue.   HENT: Negative.    Eyes: Positive for visual disturbance (glasses).   Respiratory: Positive for shortness of breath.    Cardiovascular: Negative.    Gastrointestinal: Negative.  Negative for blood in stool.   Endocrine: Negative.    Genitourinary:  "Negative.  Negative for hematuria.   Musculoskeletal: Positive for arthralgias and myalgias.   Skin: Negative.    Allergic/Immunologic: Positive for environmental allergies.   Neurological: Positive for dizziness (occas. ). Negative for syncope.   Hematological: Bruises/bleeds easily (on eliquis).   Psychiatric/Behavioral: Positive for sleep disturbance.       Objective   Vitals:    02/24/22 0926   BP: 125/76   BP Location: Left arm   Patient Position: Sitting   Pulse: 73   SpO2: 98%   Weight: 87.1 kg (192 lb)   Height: 180.3 cm (70.98\")      /76 (BP Location: Left arm, Patient Position: Sitting)   Pulse 73   Ht 180.3 cm (70.98\")   Wt 87.1 kg (192 lb)   SpO2 98%   BMI 26.79 kg/m²     Lab Results (most recent)     None          Physical Exam  Vitals and nursing note reviewed.   Constitutional:       General: He is not in acute distress.     Appearance: Normal appearance. He is well-developed.   HENT:      Head: Normocephalic and atraumatic.   Eyes:      General: No scleral icterus.        Right eye: No discharge.         Left eye: No discharge.      Conjunctiva/sclera: Conjunctivae normal.   Neck:      Vascular: No carotid bruit.   Cardiovascular:      Rate and Rhythm: Normal rate and regular rhythm.      Heart sounds: Normal heart sounds. No murmur heard.  No friction rub. No gallop.    Pulmonary:      Effort: Pulmonary effort is normal. No respiratory distress.      Breath sounds: Normal breath sounds. No wheezing or rales.   Chest:      Chest wall: No tenderness.   Musculoskeletal:      Right lower leg: No edema.      Left lower leg: No edema.   Skin:     General: Skin is warm and dry.      Coloration: Skin is not pale.      Findings: No erythema or rash.   Neurological:      Mental Status: He is alert and oriented to person, place, and time.      Cranial Nerves: No cranial nerve deficit.   Psychiatric:         Behavior: Behavior normal.         Procedure   Procedures       Assessment/Plan     Problems " Addressed this Visit        Cardiac and Vasculature    Hyperlipidemia - Primary    Chest pain    Palpitations    Essential hypertension    SOB (shortness of breath) on exertion    Mitral valve insufficiency    Relevant Medications    tadalafil (CIALIS) 5 MG tablet    Paroxysmal atrial fibrillation (HCC)    Relevant Medications    tadalafil (CIALIS) 5 MG tablet       Pulmonary and Pneumonias    SOB (shortness of breath)       Symptoms and Signs    Chronic fatigue      Diagnoses       Codes Comments    Mixed hyperlipidemia    -  Primary ICD-10-CM: E78.2  ICD-9-CM: 272.2     Precordial pain     ICD-10-CM: R07.2  ICD-9-CM: 786.51     Palpitations     ICD-10-CM: R00.2  ICD-9-CM: 785.1     Essential hypertension     ICD-10-CM: I10  ICD-9-CM: 401.9     SOB (shortness of breath) on exertion     ICD-10-CM: R06.02  ICD-9-CM: 786.05     Paroxysmal atrial fibrillation (HCC)     ICD-10-CM: I48.0  ICD-9-CM: 427.31     Mitral valve insufficiency, unspecified etiology     ICD-10-CM: I34.0  ICD-9-CM: 424.0     Chronic fatigue     ICD-10-CM: R53.82  ICD-9-CM: 780.79     SOB (shortness of breath)     ICD-10-CM: R06.02  ICD-9-CM: 786.05         Recommendation  1.  Patient is a 71-year-old male who presents back to the office for follow-up that is doing well.  He has no complaints of chest pain and only very minimal dyspnea.  For now, nothing further and will monitor    Treatment with his history of paroxysmal A. fib, we will continue rhythm control therapy.  He does not have any complaints of bleeding on Eliquis and for now we will monitor    3.  Patient with baseline hypertension doing well on current medical regimen will continue at this time    4.  Patient with dyslipidemia currently on appropriate medical therapy.  We will continue the same and see him back for follow-up in 4 to 6 months.  Follow-up with primary as scheduled             Clarence Melo  reports that he has quit smoking. His smoking use included cigarettes. He has a  2.00 pack-year smoking history. He has never used smokeless tobacco.. I have educated him on the risk of diseases from using tobacco products such as cancer, COPD and heart disease.          Advance Care Planning   ACP discussion was held with the patient during this visit. Patient does not have an advance directive, information provided.     Patient's Body mass index is 26.79 kg/m². indicating that he is overweight (BMI 25-29.9). Patient's (Body mass index is 26.79 kg/m².) indicates that they are overweight with health conditions that include obstructive sleep apnea, dyslipidemias and a-fib . Weight is to be assessed at today's visit. BMI is pcp addressing. We discussed portion control and increasing exercise. .       Electronically signed by:

## 2022-03-15 ENCOUNTER — TELEPHONE (OUTPATIENT)
Dept: CARDIOLOGY | Facility: CLINIC | Age: 72
End: 2022-03-15

## 2022-03-15 DIAGNOSIS — I47.29 VENTRICULAR TACHYCARDIA (PAROXYSMAL): Primary | ICD-10-CM

## 2022-03-15 NOTE — TELEPHONE ENCOUNTER
Patient called said that his heart rate is staying high. He has been taking his blood pressure TID. His readings are as follows:    105/80 hrt 110     96/71 hrt 126    120/82 hrt 135     89/58 hrt 87    Will contact provider and update chart at that time calling patient to let them know.      Per Leandro HENDRICKS continue metoprolol place event monitor. Will evaluate after looking at results.    Called patient he will come and get monitor tomorrow. He will go to the ER with worsening symptoms.

## 2022-03-17 ENCOUNTER — CLINICAL SUPPORT (OUTPATIENT)
Dept: CARDIOLOGY | Facility: CLINIC | Age: 72
End: 2022-03-17

## 2022-03-17 VITALS
DIASTOLIC BLOOD PRESSURE: 65 MMHG | HEIGHT: 71 IN | SYSTOLIC BLOOD PRESSURE: 105 MMHG | WEIGHT: 197 LBS | BODY MASS INDEX: 27.58 KG/M2 | HEART RATE: 74 BPM | OXYGEN SATURATION: 98 %

## 2022-03-17 DIAGNOSIS — I48.0 PAROXYSMAL ATRIAL FIBRILLATION: Primary | ICD-10-CM

## 2022-03-17 NOTE — PROGRESS NOTES
Clarence Melo  1950  3/17/2022   ?   Chief Complaint   Patient presents with   • Nurse Visit     Reports pulse was 137 this morning went to [facility] ER      ?   HPI:   ?   ? Patient presents for nurse visit, he was seen at [facility] ER earlier this morning for elevated pulse.   ?     Current Outpatient Medications:   •  albuterol sulfate  (90 Base) MCG/ACT inhaler, prn, Disp: , Rfl:   •  Allegra-D Allergy & Congestion  MG per 12 hr tablet, As Needed., Disp: , Rfl:   •  amiodarone (PACERONE) 200 MG tablet, Take 1 tablet by mouth Daily., Disp: 30 tablet, Rfl: 11  •  apixaban (ELIQUIS) 5 MG tablet tablet, Take 1 tablet by mouth 2 (Two) Times a Day., Disp: 60 tablet, Rfl: 11  •  CRESTOR 20 MG tablet, 20 mg Every Night., Disp: , Rfl:   •  fluticasone (FLONASE) 50 MCG/ACT nasal spray, 2 sprays into the nostril(s) as directed by provider Daily., Disp: , Rfl:   •  levothyroxine (SYNTHROID, LEVOTHROID) 25 MCG tablet, Daily., Disp: , Rfl:   •  metoprolol tartrate (LOPRESSOR) 25 MG tablet, Take 1 tablet by mouth Daily., Disp: 30 tablet, Rfl: 3  •  nitroglycerin (NITROSTAT) 0.4 MG SL tablet, 1 under the tongue as needed for angina, may repeat q5mins for up three doses, Disp: 25 tablet, Rfl: 1  •  tadalafil (CIALIS) 5 MG tablet, Take 5 mg by mouth Daily., Disp: , Rfl:   •  VITAMIN D PO, Take  by mouth Daily. Occasionally takes, Disp: , Rfl:    ?   ?   Patient has no known allergies.       Procedures     ?   Assessment/Plan    ?   ? 1. Atrial Fibrillation     Leandro HENDRICKS went in to speak with patient regarding visit to [facility] ER earlier today. Per Leandro patient to be scheduled for cardioversion. Will be scheduled with Dr Marin. Patient verbalized understanding. Mallorie Chong LPN    ?

## 2022-03-28 ENCOUNTER — CLINICAL SUPPORT (OUTPATIENT)
Dept: CARDIOLOGY | Facility: CLINIC | Age: 72
End: 2022-03-28

## 2022-03-28 VITALS
HEART RATE: 125 BPM | WEIGHT: 199.2 LBS | OXYGEN SATURATION: 98 % | HEIGHT: 71 IN | BODY MASS INDEX: 27.89 KG/M2 | SYSTOLIC BLOOD PRESSURE: 117 MMHG | DIASTOLIC BLOOD PRESSURE: 77 MMHG

## 2022-03-28 DIAGNOSIS — I48.92 ATRIAL FLUTTER, PAROXYSMAL: ICD-10-CM

## 2022-03-28 DIAGNOSIS — I48.0 PAROXYSMAL ATRIAL FIBRILLATION: Primary | ICD-10-CM

## 2022-03-28 PROCEDURE — 93000 ELECTROCARDIOGRAM COMPLETE: CPT | Performed by: PHYSICIAN ASSISTANT

## 2022-03-28 NOTE — PROGRESS NOTES
Clarenceleobardo Melo  1950  3/28/2022   ?   Chief Complaint   Patient presents with   • Atrial Fibrillation     Here for nurse visit EKG, s/p cardioversion on 3-   • Atrial Flutter      ?   HPI:   ?   ? Patient here for nurse visit EKG s/p successful Cardioversion per Dr. Marin on 3- for known HX PAF / Flutter. Patient states he felt great after the cardioversion. States fatigue was gone. Next day he went to clean out a house and fatigue was back and per cardio on his phone, h/r back into 120's. Patient confirms taking amiodarone, eliquis, and Metoprolol as prescribed and no bleeding on Eliquis. EKG done and to be reviewed by DARCI Medina. PH,LPN  ?     Current Outpatient Medications:   •  albuterol sulfate  (90 Base) MCG/ACT inhaler, prn, Disp: , Rfl:   •  Allegra-D Allergy & Congestion  MG per 12 hr tablet, As Needed., Disp: , Rfl:   •  amiodarone (PACERONE) 200 MG tablet, Take 1 tablet by mouth Daily., Disp: 30 tablet, Rfl: 11  •  apixaban (ELIQUIS) 5 MG tablet tablet, Take 1 tablet by mouth 2 (Two) Times a Day., Disp: 60 tablet, Rfl: 11  •  CRESTOR 20 MG tablet, 20 mg Every Night., Disp: , Rfl:   •  fluticasone (FLONASE) 50 MCG/ACT nasal spray, 2 sprays into the nostril(s) as directed by provider Daily., Disp: , Rfl:   •  levothyroxine (SYNTHROID, LEVOTHROID) 25 MCG tablet, Daily., Disp: , Rfl:   •  metoprolol tartrate (LOPRESSOR) 25 MG tablet, Take 1 tablet by mouth Daily. (Patient taking differently: Take 25 mg by mouth 2 (Two) Times a Day.), Disp: 30 tablet, Rfl: 3  •  tadalafil (CIALIS) 5 MG tablet, Take 5 mg by mouth Daily., Disp: , Rfl:   •  VITAMIN D PO, Take  by mouth Daily. Occasionally takes, Disp: , Rfl:   •  nitroglycerin (NITROSTAT) 0.4 MG SL tablet, 1 under the tongue as needed for angina, may repeat q5mins for up three doses, Disp: 25 tablet, Rfl: 1   ?   ?   Patient has no known allergies.         ECG 12 Lead    Date/Time: 3/28/2022 1:29 PM  Performed by: Sukh,  ELADIO Braun  Authorized by: Leandro Luz PA   Comparison: not compared with previous ECG                ?   Assessment/Plan    ?   ? 1. PAF / FLUTTER    EKG reviewed by Leandro Luz, DARCI and in to see patient to discuss referral to EP for possible ablation. Patient agreeable to referral and will go to [facility]. For sooner appt. If needed. Referral order placed and patient aware to call our office by next Monday if does not receive a call regarding appt. Aware to continue current meds. Verbalized he understood. PH,LPN?

## 2022-03-30 ENCOUNTER — TELEPHONE (OUTPATIENT)
Dept: CARDIOLOGY | Facility: CLINIC | Age: 72
End: 2022-03-30

## 2022-04-05 ENCOUNTER — TELEPHONE (OUTPATIENT)
Dept: CARDIOLOGY | Facility: CLINIC | Age: 72
End: 2022-04-05

## 2022-04-05 NOTE — TELEPHONE ENCOUNTER
Leandro Luz PA [place], [place], [place]  Caller: Unspecified (6 days ago,  4:44 PM)  Have patient to monitor heart rate readings and call back in 1 week.  It is okay if it runs in the 120s as long as he is not symptomatic at this point.  If heart rate worsens or he develops symptoms such as chest pain or shortness of breath, want him to call the office .    Called patient and notified him of above.  Laurie FORDEA

## 2022-04-27 ENCOUNTER — OFFICE VISIT (OUTPATIENT)
Dept: CARDIOLOGY | Facility: CLINIC | Age: 72
End: 2022-04-27

## 2022-04-27 VITALS
SYSTOLIC BLOOD PRESSURE: 146 MMHG | DIASTOLIC BLOOD PRESSURE: 94 MMHG | HEART RATE: 120 BPM | HEIGHT: 71 IN | WEIGHT: 190 LBS | OXYGEN SATURATION: 98 % | BODY MASS INDEX: 26.6 KG/M2

## 2022-04-27 DIAGNOSIS — I48.92 ATRIAL FLUTTER, PAROXYSMAL: Primary | ICD-10-CM

## 2022-04-27 DIAGNOSIS — R06.02 SOB (SHORTNESS OF BREATH) ON EXERTION: ICD-10-CM

## 2022-04-27 DIAGNOSIS — I10 ESSENTIAL HYPERTENSION: ICD-10-CM

## 2022-04-27 PROCEDURE — 99213 OFFICE O/P EST LOW 20 MIN: CPT | Performed by: PHYSICIAN ASSISTANT

## 2022-04-27 NOTE — PROGRESS NOTES
Subjective   Clarence Melo is a 72 y.o. male     Chief Complaint   Patient presents with   • Follow-up     3 months   Problem list:     1. Low risk stress test 2016  1.1 low risk stress test January 2018  2. Preserved systolic function  3. Dyslipidemia  4. Obstructive sleep apnea being treated with CPAP  5.  Atrial fibrillation  5.1 diagnosed during ER visit and hospitalization at [facility], December, 2021.    HPI  The patient presents into the clinic today for hospital follow-up.  He was recently hospitalized because of atrial flutter with rapid ventricular response rates.  He eventually was scheduled for cardioversion, with return to normal sinus rhythm at that time.  He was noted to have an EF at 45% by evaluation at that time, and associated mitral regurgitation which was moderate.  Amiodarone therapy was continued.  He eventually was seen back after he converted from sinus rhythm back to atrial flutter.  He was admitted.  Metoprolol was increased from 25 mg twice a day to 50 mg twice a day.  Amiodarone was continued.  His heart rates eventually improved and he was discharged and is now seen for evaluation.  The patient reports that since increasing metoprolol, he has had some improvement.  He still notes heart rates around 120 beats a minute at times, but overall feels improved.  He has no chest pain.  His dyspnea is at baseline.  With rapid ventricular response rates, he will have weakness and dizziness.  The patient has no further complaints otherwise at this time.      Current Outpatient Medications   Medication Sig Dispense Refill   • albuterol sulfate  (90 Base) MCG/ACT inhaler prn     • Allegra-D Allergy & Congestion  MG per 12 hr tablet As Needed.     • amiodarone (PACERONE) 200 MG tablet Take 1 tablet by mouth Daily. 30 tablet 11   • apixaban (ELIQUIS) 5 MG tablet tablet Take 1 tablet by mouth 2 (Two) Times a Day. 60 tablet 11   • CRESTOR 20 MG tablet 20 mg Every  Night.     • fluticasone (FLONASE) 50 MCG/ACT nasal spray 2 sprays into the nostril(s) as directed by provider Daily.     • levothyroxine (SYNTHROID, LEVOTHROID) 25 MCG tablet Daily.     • metoprolol tartrate (LOPRESSOR) 25 MG tablet Take 1 tablet by mouth Daily. (Patient taking differently: Take 50 mg by mouth Daily.) 30 tablet 3   • nitroglycerin (NITROSTAT) 0.4 MG SL tablet 1 under the tongue as needed for angina, may repeat q5mins for up three doses 25 tablet 1   • tadalafil (CIALIS) 5 MG tablet Take 5 mg by mouth Daily.     • VITAMIN D PO Take  by mouth Daily. Occasionally takes       No current facility-administered medications for this visit.       Patient has no known allergies.    Past Medical History:   Diagnosis Date   • Atrial fibrillation (HCC)    • COVID 03/2022   • Environmental allergies    • Environmental allergies    • Hyperlipidemia    • Hypertension    • Sleep apnea     has a c-pap       Social History     Socioeconomic History   • Marital status: Single   Tobacco Use   • Smoking status: Former Smoker     Packs/day: 0.25     Years: 8.00     Pack years: 2.00     Types: Cigarettes   • Smokeless tobacco: Never Used   Substance and Sexual Activity   • Alcohol use: Yes     Alcohol/week: 1.0 standard drink     Types: 1 Cans of beer per week     Comment: socially   • Drug use: No   • Sexual activity: Defer       Family History   Problem Relation Age of Onset   • Dementia Mother    • Heart disease Father    • Hyperlipidemia Father    • Alzheimer's disease Other    • Heart failure Other    • No Known Problems Sister    • No Known Problems Brother    • No Known Problems Maternal Aunt    • No Known Problems Maternal Uncle    • No Known Problems Paternal Aunt    • No Known Problems Paternal Uncle    • No Known Problems Maternal Grandmother    • No Known Problems Maternal Grandfather    • No Known Problems Paternal Grandmother    • No Known Problems Paternal Grandfather    • Anemia Neg Hx    • Arrhythmia Neg  "Hx    • Asthma Neg Hx    • Clotting disorder Neg Hx    • Fainting Neg Hx    • Heart attack Neg Hx    • Hypertension Neg Hx        Review of Systems   Constitutional: Negative.  Negative for activity change, appetite change, chills, fatigue and fever.   HENT: Negative for congestion, rhinorrhea, sinus pressure, sinus pain and sore throat.    Eyes: Positive for visual disturbance (Qday).   Respiratory: Positive for apnea (CPAP) and shortness of breath (With any activity). Negative for cough, chest tightness and wheezing.    Cardiovascular: Negative.  Negative for chest pain, palpitations and leg swelling.   Endocrine: Negative.  Negative for cold intolerance and heat intolerance.   Genitourinary: Negative.    Musculoskeletal: Positive for arthralgias (Armando hands) and gait problem (Off balance at times). Negative for back pain, joint swelling, neck pain and neck stiffness.   Skin: Negative.  Negative for color change, rash and wound.   Allergic/Immunologic: Positive for environmental allergies (Enviromental). Negative for food allergies.   Psychiatric/Behavioral: Positive for sleep disturbance (Hard time falling asleep at night).       Objective     Vitals:    04/27/22 0845   BP: 146/94   BP Location: Left arm   Patient Position: Sitting   Cuff Size: Adult   Pulse: 120   SpO2: 98%   Weight: 86.2 kg (190 lb)   Height: 180.3 cm (71\")        /94 (BP Location: Left arm, Patient Position: Sitting, Cuff Size: Adult)   Pulse 120   Ht 180.3 cm (71\")   Wt 86.2 kg (190 lb)   SpO2 98%   BMI 26.50 kg/m²      Lab Results (most recent)     None          Physical Exam  Vitals and nursing note reviewed.   Constitutional:       General: He is not in acute distress.     Appearance: He is well-developed.   HENT:      Head: Normocephalic and atraumatic.   Eyes:      Conjunctiva/sclera: Conjunctivae normal.      Pupils: Pupils are equal, round, and reactive to light.   Neck:      Vascular: No JVD.      Trachea: No tracheal " deviation.   Cardiovascular:      Rate and Rhythm: Tachycardia present. Rhythm irregular.      Heart sounds: Normal heart sounds.   Pulmonary:      Effort: Pulmonary effort is normal.      Breath sounds: Normal breath sounds.   Abdominal:      General: Bowel sounds are normal. There is no distension.      Palpations: Abdomen is soft. There is no mass.      Tenderness: There is no abdominal tenderness. There is no guarding or rebound.   Musculoskeletal:         General: No tenderness or deformity. Normal range of motion.      Cervical back: Normal range of motion and neck supple.      Right lower le+ Edema present.      Left lower le+ Edema present.   Skin:     General: Skin is warm and dry.      Coloration: Skin is not pale.      Findings: No erythema or rash.   Neurological:      Mental Status: He is alert and oriented to person, place, and time.   Psychiatric:         Behavior: Behavior normal.         Thought Content: Thought content normal.         Judgment: Judgment normal.         Procedure   Procedures         Assessment/Plan      Diagnosis Plan   1. Atrial flutter, paroxysmal (HCC)     2. SOB (shortness of breath) on exertion     3. Essential hypertension         1.  The patient presents back today for hospital follow-up.  He was recently hospitalized because of recurrent rapid ventricular response rates as above.  He has now been managed with increase in metoprolol therapy and feels improved.  He still has episodes of rapid ventricular response rates at times.  I will continue metoprolol and amiodarone without change.  We will monitor course and see if he maintains stability on that regimen.    2.  I have asked that he be evaluated by EP services sooner than his previously scheduled appointment given ongoing and recurrent episodes of atrial flutter.  He would like to see his EP provider sooner.  We will attempt to contact our office for the same.    3.  Otherwise, recent echo supported an EF at 40  to 45% with moderate MR.  I would like to start the patient on ACE inhibitor therapy or angiotensin receptor blockade therapy at some time.  Again he was just recently increased to metoprolol and we will await response to that to see what dosing we can utilize in the future.    4.  For complications, the patient will call immediately.  I would anticipate seeing him in the next 1 to 2 months just to ensure ongoing stability, which hopefully he will have seen his EP provider.  He will return to the clinic for any complications.       Advance Care Planning   ACP discussion was declined by the patient. Patient does not have an advance directive, declines further assistance.     Patient brought in medicine list to appointment, it's been reviewed with patient and med list was updated in the chart.          Electronically signed by:

## 2022-05-20 ENCOUNTER — OFFICE VISIT (OUTPATIENT)
Dept: CARDIOLOGY | Facility: CLINIC | Age: 72
End: 2022-05-20

## 2022-05-20 VITALS
WEIGHT: 200 LBS | HEIGHT: 71 IN | DIASTOLIC BLOOD PRESSURE: 85 MMHG | OXYGEN SATURATION: 96 % | SYSTOLIC BLOOD PRESSURE: 110 MMHG | HEART RATE: 111 BPM | BODY MASS INDEX: 28 KG/M2

## 2022-05-20 DIAGNOSIS — I48.4 ATYPICAL ATRIAL FLUTTER: ICD-10-CM

## 2022-05-20 DIAGNOSIS — G47.33 OSA (OBSTRUCTIVE SLEEP APNEA): ICD-10-CM

## 2022-05-20 DIAGNOSIS — I48.0 PAROXYSMAL ATRIAL FIBRILLATION: Primary | ICD-10-CM

## 2022-05-20 DIAGNOSIS — I10 ESSENTIAL HYPERTENSION: ICD-10-CM

## 2022-05-20 PROCEDURE — 99214 OFFICE O/P EST MOD 30 MIN: CPT | Performed by: STUDENT IN AN ORGANIZED HEALTH CARE EDUCATION/TRAINING PROGRAM

## 2022-05-20 PROCEDURE — 93000 ELECTROCARDIOGRAM COMPLETE: CPT | Performed by: NURSE PRACTITIONER

## 2022-05-20 NOTE — PROGRESS NOTES
[facility] Cardiology    Subjective:     Encounter Date:05/20/2022     Patient ID: Clarence Melo is a 72 y.o. male.  Referring provider: ELADIO Carrion     Reason for consultation:   Chief Complaint   Patient presents with   • Atrial Flutter      PROBLEM LIST:  1. Atrial flutter  Atrial fibrillation  a. Diagnosed 12/2021. Started on amiodarone.   b. CHADSVASC = 2, on Eliquis  c. Echo 1/27/22: EF 55-60%, grade III diastolic dysfunction, mild restrictive physiology, LA mild to moderately dilated RA mildly enlarged. RV upper limits of normal. Mild MR. Trivial AI. RVSP 20.  d. Monitor 12/20-1/2/2022: 2% Afib/flutter. (70)bpm.   e. Echo 3/2022: EF 40-45%. Mild aortic sclerosis with trace AI, moderate MR, mild thickening of mitral valve leaflets.   f. S/p successful ECV, 3/2022.   2. Systolic heart failure  a. Echo 1/27/22: EF 55-60%, grade III diastolic dysfunction,  b. Echo 3/2022: EF 40-45%.   3. Hyperlipidemia  4. Obstructive sleep apnea       HPI:      Clarence Melo is a 72 y.o. male who is seen in consultation today at the request of ELADIO Carrion for Afib/flutter. He was incidentally noted to have Afib/flutter on routine EKG at primary care provider's office. He was subsequently sent to the ED where he was given IV amiodarone with bolus. He was discharged home with oral amiodarone a couple of days later and followed up with Leandro Luz. He was still in Afib when he saw Leandro Luz PA-C in clinic on 12/20. He was set up for ECV with Dr. Neff on 1/13/2022 but was in NSR at that time. He did well briefly then ended up getting readmitted for Aflutter with RVR. He underwent ECV with conversion to NSR. He reports feeling better for for a few days when he was in NSR; specifically had less SOA and more energy. Since his cardioversion in March, he reports that his HR has been mostly in the 120's on current meds. He denies LH, dizziness, edema, PND, orthopnea. He has  CARLOS ALBERTO and is compliant with CPAP.      Medications and Allergies:    No Known Allergies      Current Outpatient Medications:   •  Allegra-D Allergy & Congestion  MG per 12 hr tablet, As Needed., Disp: , Rfl:   •  amiodarone (PACERONE) 200 MG tablet, Take 1 tablet by mouth Daily., Disp: 30 tablet, Rfl: 11  •  apixaban (ELIQUIS) 5 MG tablet tablet, Take 1 tablet by mouth 2 (Two) Times a Day., Disp: 60 tablet, Rfl: 11  •  CRESTOR 20 MG tablet, 20 mg Every Night., Disp: , Rfl:   •  levothyroxine (SYNTHROID, LEVOTHROID) 25 MCG tablet, Daily., Disp: , Rfl:   •  metoprolol tartrate (LOPRESSOR) 25 MG tablet, Take 1 tablet by mouth Daily. (Patient taking differently: Take 50 mg by mouth 2 (Two) Times a Day.), Disp: 30 tablet, Rfl: 3  •  nitroglycerin (NITROSTAT) 0.4 MG SL tablet, 1 under the tongue as needed for angina, may repeat q5mins for up three doses, Disp: 25 tablet, Rfl: 1    Social History:  Social History     Tobacco Use   • Smoking status: Former Smoker     Packs/day: 0.25     Years: 8.00     Pack years: 2.00     Types: Cigarettes     Quit date: 1972     Years since quittin.4   • Smokeless tobacco: Never Used   Substance Use Topics   • Alcohol use: Yes     Alcohol/week: 1.0 standard drink     Types: 1 Cans of beer per week     Comment: socially        Family History:  family history includes Alzheimer's disease in an other family member; Dementia in his mother; Heart disease in his father; Heart failure in an other family member; Hyperlipidemia in his father; No Known Problems in his brother, maternal aunt, maternal grandfather, maternal grandmother, maternal uncle, paternal aunt, paternal grandfather, paternal grandmother, paternal uncle, and sister.     Review of Systems: Pertinent positives in HPI    The following portions of the patient's history were reviewed and updated as appropriate: allergies, current medications and problem list.       Objective:     Vitals:    22 1005   BP: 110/85    Pulse: 111   SpO2: 96%       GENERAL: This is a well-developed, well-nourished, male who is in no acute distress.   SKIN: Pink and warm without rash or abnormality noted.   HEENT: Head is normocephalic and atraumatic. Pupils are equal and reactive to light bilaterally. Mucous membranes are pink and moist.   NECK: Supple without lymphadenopathy or thyromegaly. There is no jugular venous distention at 30°.  LUNGS: Clear to auscultation bilaterally without wheezing, rhonchi, or rales noted.   CARDIOVASCULAR: The heart has a tachy rate with a normal S1 and S2. There is no murmur, gallop, rub, or click appreciated. The PMI is nondisplaced. Carotid upstrokes are 2+ and symmetrical without bruits.   ABDOMEN: Soft and nondistended with positive bowel sounds x4. The patient denies tenderness of palpitation.   MUSCULOSKELETAL: There are no obvious bony abnormalities. Normal range of tenderness to palpation.   NEUROLOGICAL: Nonfocal. Alert and oriented x3.   PERIPHERAL VASCULAR: Femoral pulses are 2+ and symmetrical without bruits. Posterior tibial and dorsalis pedis pulses are 2+ and symmetrical. There is no peripheral edema.   PSYCH: Normal mood and affect.         ECG 12 Lead    Date/Time: 5/20/2022 10:27 AM  Performed by: Lilli Morrison APRN  Authorized by: Lilli Morrison APRN   Comparison: compared with previous ECG from 3/28/2022  Rhythm: atrial flutter  BPM: 115              Advance Care Planning   ACP discussion was declined by the patient. Patient does not have an advance directive, declines further assistance.      ASSESSMENT       ICD-10-CM ICD-9-CM   1. Paroxysmal atrial fibrillation (HCC)  I48.0 427.31   2. Essential hypertension  I10 401.9   3. CARLOS ALBERTO (obstructive sleep apnea)  G47.33 327.23   4. Atypical atrial flutter (HCC)  I48.4 427.32            PLAN     1. Atrial flutter  Atrial fibrillation  1. CHADSVASC = 2, compliant and tolerating eliquis well  2. Symptomatic with SOA, fatigue  3. Echo  3/2022: EF 45%, suspect EF is reduced due to tachycardia  4. Poorly rate controlled on 50 mg of metoprolol tartrate  5. ECG today shows likely atrial flutter with 2-1 conduction.  This is a relatively slow flutter with a cycle length of around 260 ms.  This most likely still represents typical atrial flutter, but the cycle length may have just been slow due to amiodarone.  We discussed in detail the management options going forward.  I think he certainly needs an ablation procedure as his atrial flutter has been quite persistent.  We also discussed the option of atrial fibrillation ablation as well at that time.  He is quite interested in proceeding with this.  We work on getting this scheduled.  In the meantime, he feels poorly in the atrial flutter and we will work on setting up a cardioversion in [place] prior to the ablation.  We discussed the pros and cons of continuing amiodarone.  Since he has had frequent recurrences of his flutter we will go ahead and continue the amiodarone and likely discontinue it at some point after the ablation procedure.  2. CARLOS ALBERTO  1. Compliant with CPAP  3. Hypertension  1. Continued on metoprolol  4. Follow-up in No follow-ups on file., sooner as needed.      Scribed for Benedicto Gupta MD by JAS Morrison, ANTHONY. 5/20/2022  10:28 EDT  I, Benedicto Gupta MD, personally performed the services described in this documentation as scribed by the above named individual in my presence, and it is both accurate and complete.  5/20/2022  11:20 EDT

## 2022-05-23 DIAGNOSIS — I48.0 PAROXYSMAL ATRIAL FIBRILLATION: Primary | ICD-10-CM

## 2022-05-23 DIAGNOSIS — I48.4 ATYPICAL ATRIAL FLUTTER: ICD-10-CM

## 2022-05-26 ENCOUNTER — HOSPITAL ENCOUNTER (OUTPATIENT)
Dept: CARDIOLOGY | Facility: HOSPITAL | Age: 72
Discharge: HOME | End: 2022-05-26
Attending: INTERNAL MEDICINE | Admitting: INTERNAL MEDICINE

## 2022-05-26 VITALS
WEIGHT: 200.18 LBS | HEART RATE: 67 BPM | TEMPERATURE: 98 F | BODY MASS INDEX: 28.02 KG/M2 | HEIGHT: 71 IN | SYSTOLIC BLOOD PRESSURE: 107 MMHG | DIASTOLIC BLOOD PRESSURE: 73 MMHG | OXYGEN SATURATION: 98 % | RESPIRATION RATE: 16 BRPM

## 2022-05-26 DIAGNOSIS — I48.4 ATYPICAL ATRIAL FLUTTER: ICD-10-CM

## 2022-05-26 LAB
ANION GAP SERPL CALCULATED.3IONS-SCNC: 8 MMOL/L (ref 5–15)
BUN SERPL-MCNC: 14 MG/DL (ref 8–23)
BUN/CREAT SERPL: 12.4 (ref 7–25)
CALCIUM SPEC-SCNC: 9.6 MG/DL (ref 8.6–10.5)
CHLORIDE SERPL-SCNC: 104 MMOL/L (ref 98–107)
CO2 SERPL-SCNC: 27 MMOL/L (ref 22–29)
CREAT SERPL-MCNC: 1.13 MG/DL (ref 0.76–1.27)
DEPRECATED RDW RBC AUTO: 42.4 FL (ref 37–54)
EGFRCR SERPLBLD CKD-EPI 2021: 69.1 ML/MIN/1.73
ERYTHROCYTE [DISTWIDTH] IN BLOOD BY AUTOMATED COUNT: 12.9 % (ref 12.3–15.4)
GLUCOSE SERPL-MCNC: 105 MG/DL (ref 65–99)
HCT VFR BLD AUTO: 50.1 % (ref 37.5–51)
HGB BLD-MCNC: 16.6 G/DL (ref 13–17.7)
MAXIMAL PREDICTED HEART RATE: 148 BPM
MCH RBC QN AUTO: 29.7 PG (ref 26.6–33)
MCHC RBC AUTO-ENTMCNC: 33.1 G/DL (ref 31.5–35.7)
MCV RBC AUTO: 89.6 FL (ref 79–97)
PLATELET # BLD AUTO: 181 10*3/MM3 (ref 140–450)
PMV BLD AUTO: 9.9 FL (ref 6–12)
POTASSIUM SERPL-SCNC: 4.5 MMOL/L (ref 3.5–5.2)
QT INTERVAL: 432 MS
QTC INTERVAL: 469 MS
RBC # BLD AUTO: 5.59 10*6/MM3 (ref 4.14–5.8)
SODIUM SERPL-SCNC: 139 MMOL/L (ref 136–145)
STRESS TARGET HR: 126 BPM
WBC NRBC COR # BLD: 8.87 10*3/MM3 (ref 3.4–10.8)

## 2022-05-26 PROCEDURE — A9270 NON-COVERED ITEM OR SERVICE: HCPCS | Performed by: INTERNAL MEDICINE

## 2022-05-26 PROCEDURE — 85027 COMPLETE CBC AUTOMATED: CPT | Performed by: INTERNAL MEDICINE

## 2022-05-26 PROCEDURE — 93005 ELECTROCARDIOGRAM TRACING: CPT | Performed by: INTERNAL MEDICINE

## 2022-05-26 PROCEDURE — S0260 H&P FOR SURGERY: HCPCS | Performed by: INTERNAL MEDICINE

## 2022-05-26 PROCEDURE — 92960 CARDIOVERSION ELECTRIC EXT: CPT

## 2022-05-26 PROCEDURE — 63710000001 APIXABAN 5 MG TABLET: Performed by: INTERNAL MEDICINE

## 2022-05-26 PROCEDURE — 92960 CARDIOVERSION ELECTRIC EXT: CPT | Performed by: INTERNAL MEDICINE

## 2022-05-26 PROCEDURE — 36415 COLL VENOUS BLD VENIPUNCTURE: CPT

## 2022-05-26 PROCEDURE — 99152 MOD SED SAME PHYS/QHP 5/>YRS: CPT | Performed by: INTERNAL MEDICINE

## 2022-05-26 PROCEDURE — 80048 BASIC METABOLIC PNL TOTAL CA: CPT | Performed by: INTERNAL MEDICINE

## 2022-05-26 RX ORDER — METOPROLOL TARTRATE 50 MG/1
50 TABLET, FILM COATED ORAL 2 TIMES DAILY
Qty: 180 TABLET | Refills: 3 | Status: SHIPPED | OUTPATIENT
Start: 2022-05-26 | End: 2022-07-21

## 2022-05-26 RX ORDER — AMIODARONE HYDROCHLORIDE 200 MG/1
200 TABLET ORAL DAILY
Qty: 90 TABLET | Refills: 3 | Status: SHIPPED | OUTPATIENT
Start: 2022-05-26 | End: 2022-06-27 | Stop reason: HOSPADM

## 2022-05-26 RX ORDER — MIDAZOLAM HYDROCHLORIDE 1 MG/ML
INJECTION INTRAMUSCULAR; INTRAVENOUS
Status: DISCONTINUED
Start: 2022-05-26 | End: 2022-05-26 | Stop reason: HOSPADM

## 2022-05-26 RX ORDER — TADALAFIL 5 MG/1
5 TABLET ORAL DAILY
COMMUNITY

## 2022-05-26 RX ORDER — METOPROLOL TARTRATE 50 MG/1
50 TABLET, FILM COATED ORAL 2 TIMES DAILY
Status: ON HOLD | COMMUNITY
End: 2022-05-26 | Stop reason: SDUPTHER

## 2022-05-26 RX ADMIN — APIXABAN 5 MG: 5 TABLET, FILM COATED ORAL at 12:26

## 2022-05-31 ENCOUNTER — PREP FOR SURGERY (OUTPATIENT)
Dept: OTHER | Facility: HOSPITAL | Age: 72
End: 2022-05-31

## 2022-05-31 DIAGNOSIS — I48.0 PAROXYSMAL ATRIAL FIBRILLATION: Primary | ICD-10-CM

## 2022-05-31 RX ORDER — ACETAMINOPHEN 325 MG/1
650 TABLET ORAL EVERY 4 HOURS PRN
Status: CANCELLED | OUTPATIENT
Start: 2022-05-31

## 2022-05-31 RX ORDER — SODIUM CHLORIDE 0.9 % (FLUSH) 0.9 %
3 SYRINGE (ML) INJECTION EVERY 12 HOURS SCHEDULED
Status: CANCELLED | OUTPATIENT
Start: 2022-05-31

## 2022-05-31 RX ORDER — SODIUM CHLORIDE 0.9 % (FLUSH) 0.9 %
10 SYRINGE (ML) INJECTION AS NEEDED
Status: CANCELLED | OUTPATIENT
Start: 2022-05-31

## 2022-06-09 ENCOUNTER — OFFICE VISIT (OUTPATIENT)
Dept: CARDIOLOGY | Facility: CLINIC | Age: 72
End: 2022-06-09

## 2022-06-09 VITALS
WEIGHT: 204 LBS | DIASTOLIC BLOOD PRESSURE: 87 MMHG | HEART RATE: 120 BPM | SYSTOLIC BLOOD PRESSURE: 126 MMHG | BODY MASS INDEX: 28.56 KG/M2 | HEIGHT: 71 IN | OXYGEN SATURATION: 95 %

## 2022-06-09 DIAGNOSIS — I48.4 ATYPICAL ATRIAL FLUTTER: Primary | ICD-10-CM

## 2022-06-09 DIAGNOSIS — I10 ESSENTIAL HYPERTENSION: ICD-10-CM

## 2022-06-09 DIAGNOSIS — E78.2 MIXED HYPERLIPIDEMIA: ICD-10-CM

## 2022-06-09 PROCEDURE — 93000 ELECTROCARDIOGRAM COMPLETE: CPT | Performed by: PHYSICIAN ASSISTANT

## 2022-06-09 PROCEDURE — 99213 OFFICE O/P EST LOW 20 MIN: CPT | Performed by: PHYSICIAN ASSISTANT

## 2022-06-09 RX ORDER — LORATADINE 10 MG/1
10 TABLET ORAL AS NEEDED
COMMUNITY

## 2022-06-09 NOTE — PROGRESS NOTES
Problem list     Subjective   Clarence Melo is a 72 y.o. male     Chief Complaint   Patient presents with   • Atrial Flutter   • Cardioversion     May 26 th   Problem list:     1. Low risk stress test 2016  1.1 low risk stress test January 2018  2. Preserved systolic function  3. Dyslipidemia  4. Obstructive sleep apnea being treated with CPAP  5.  Atrial fibrillation  5.1 diagnosed during ER visit and hospitalization at [facility] December 2021  5.2 recent electrocardioversion performed at [facility] in [facility]    Patient is a 72-year-old male who presents back to the office for follow-up.  Patient is follow-up from electrocardioversion but unfortunately continues to be in atrial flutter.  He apparently transition to back    He has no chest pain or pressure.  He has very minimal to mild dyspnea.  No significant dyspnea at all.  No PND orthopnea.    Patient has had palpitations in the past but nothing significant recently.  No complaints of presyncope or syncope.  He has had some fatigue but otherwise is stable    Current Outpatient Medications on File Prior to Visit   Medication Sig Dispense Refill   • amiodarone (PACERONE) 200 MG tablet Take 1 tablet by mouth Daily. 90 tablet 3   • apixaban (ELIQUIS) 5 MG tablet tablet Take 1 tablet by mouth 2 (Two) Times a Day. 180 tablet 3   • CRESTOR 20 MG tablet 20 mg Every Night.     • levothyroxine (SYNTHROID, LEVOTHROID) 25 MCG tablet Take 25 mcg by mouth Daily.     • loratadine (Claritin) 10 MG tablet Take 10 mg by mouth Daily.     • metoprolol tartrate (LOPRESSOR) 50 MG tablet Take 1 tablet by mouth 2 (Two) Times a Day. 180 tablet 3   • Tadalafil (CIALIS PO) Take  by mouth As Needed.       No current facility-administered medications on file prior to visit.       Patient has no known allergies.    Past Medical History:   Diagnosis Date   • Atrial fibrillation (HCC)    • COVID 03/2022   • Environmental allergies    • Environmental  allergies    • Hyperlipidemia    • Hypertension    • Sleep apnea     has a c-pap       Social History     Socioeconomic History   • Marital status: Single   Tobacco Use   • Smoking status: Former Smoker     Packs/day: 0.25     Years: 8.00     Pack years: 2.00     Types: Cigarettes     Quit date:      Years since quittin.4   • Smokeless tobacco: Never Used   Substance and Sexual Activity   • Alcohol use: Yes     Alcohol/week: 1.0 standard drink     Types: 1 Cans of beer per week     Comment: socially   • Drug use: No   • Sexual activity: Defer       Family History   Problem Relation Age of Onset   • Dementia Mother    • Heart disease Father    • Hyperlipidemia Father    • No Known Problems Sister    • No Known Problems Brother    • No Known Problems Maternal Aunt    • No Known Problems Maternal Uncle    • No Known Problems Paternal Aunt    • No Known Problems Paternal Uncle    • No Known Problems Maternal Grandmother    • No Known Problems Maternal Grandfather    • No Known Problems Paternal Grandmother    • No Known Problems Paternal Grandfather    • Alzheimer's disease Other    • Heart failure Other    • Anemia Neg Hx    • Arrhythmia Neg Hx    • Asthma Neg Hx    • Clotting disorder Neg Hx    • Fainting Neg Hx    • Heart attack Neg Hx    • Hypertension Neg Hx        Review of Systems   Constitutional: Positive for fatigue. Negative for chills and fever.   HENT: Negative.  Negative for congestion and sinus pressure.    Respiratory: Positive for apnea (CARLOS ALBERTO) and shortness of breath. Negative for chest tightness.    Cardiovascular: Negative.  Negative for chest pain, palpitations and leg swelling.   Gastrointestinal: Negative.  Negative for abdominal pain, blood in stool, constipation, diarrhea, nausea and vomiting.   Endocrine: Negative.  Negative for cold intolerance and heat intolerance.   Genitourinary: Negative.  Negative for dysuria, frequency, hematuria and urgency.   Neurological: Negative.  Negative  "for dizziness, syncope and light-headedness.   Psychiatric/Behavioral: Positive for sleep disturbance (cpap, denies waking with soa or cp).       Objective   Vitals:    06/09/22 1538   BP: 126/87   BP Location: Left arm   Patient Position: Sitting   Pulse: 120   SpO2: 95%   Weight: 92.5 kg (204 lb)   Height: 180.3 cm (71\")      /87 (BP Location: Left arm, Patient Position: Sitting)   Pulse 120   Ht 180.3 cm (71\")   Wt 92.5 kg (204 lb)   SpO2 95%   BMI 28.45 kg/m²     Lab Results (most recent)     None          Physical Exam  Vitals and nursing note reviewed.   Constitutional:       General: He is not in acute distress.     Appearance: Normal appearance. He is well-developed.   HENT:      Head: Normocephalic and atraumatic.   Eyes:      General: No scleral icterus.        Right eye: No discharge.         Left eye: No discharge.      Conjunctiva/sclera: Conjunctivae normal.   Neck:      Vascular: No carotid bruit.   Cardiovascular:      Rate and Rhythm: Normal rate. Rhythm irregularly irregular.      Heart sounds: Normal heart sounds. No murmur heard.    No friction rub. No gallop.   Pulmonary:      Effort: Pulmonary effort is normal. No respiratory distress.      Breath sounds: Normal breath sounds. No wheezing or rales.   Chest:      Chest wall: No tenderness.   Musculoskeletal:      Right lower leg: No edema.      Left lower leg: No edema.   Skin:     General: Skin is warm and dry.      Coloration: Skin is not pale.      Findings: No erythema or rash.   Neurological:      Mental Status: He is alert and oriented to person, place, and time.      Cranial Nerves: No cranial nerve deficit.   Psychiatric:         Behavior: Behavior normal.         Procedure     ECG 12 Lead    Date/Time: 6/9/2022 3:47 PM  Performed by: Leandro Luz PA  Authorized by: Leandro Luz PA   Comparison: compared with previous ECG from 5/26/2022  Comments: EKG demonstrates likely atrial flutter with rapid ventricular " sponsor rate and no acute ST changes               Assessment & Plan     Problems Addressed this Visit        Cardiac and Vasculature    Hyperlipidemia    Essential hypertension    Atypical atrial flutter (HCC) - Primary      Diagnoses       Codes Comments    Atypical atrial flutter (HCC)    -  Primary ICD-10-CM: I48.4  ICD-9-CM: 427.32     Essential hypertension     ICD-10-CM: I10  ICD-9-CM: 401.9     Mixed hyperlipidemia     ICD-10-CM: E78.2  ICD-9-CM: 272.2       Recommendation  1.  Patient with atrial flutter and is sent to see EP services and apparently have EP study with possible ablation.  Patient will follow up with them.    2.  Patient with baseline hypertension doing well on medical regimen will continue    3.  Patient dyslipidemia on statin therapy.  We will continue medications.  Labs are being monitored by primary.  We will see patient back for follow-up in 3 to 4 months.  Follow-up with primary as scheduled             Patient did not bring med list or medicine bottles to appointment, med list has been reviewed and updated based on patient's knowledge of their meds.     Advance Care Planning   ACP discussion was held with the patient during this visit. Patient does not have an advance directive, declines further assistance.            Electronically signed by:

## 2022-06-23 NOTE — NURSING NOTE
"PRE-PVA ASSESSMENT  Clarence Melo 1950   [address]  [facility] [address]   [phone]    Referral Source: ELADIO Medina  Information obtained from: [x] Medical record review  [x] Patient report  Scheduled for: PVA on 6/27/22 with Dr. Gupta  No Known Allergies    AFib Specific History:  AFIBTYPE: paroxysmal    CHADS-VASc Risk Assessment            2 Total Score    1 Hypertension    1 Age 65-74        Criteria that do not apply:    CHF    Age >/= 75    DM    PRIOR STROKE/TIA/THROMBO    Vascular Disease    Sex: Female        Anticoagulation: Eliquis 5 mg bid NO MISSED DOSES   Cardioversion x 3  Failed AAD(s): amiodarone  Prior Ablation: No    Is Mr. Melo aware of his AFib? Yes   Onset: 2021     Exacerbations: none    Frequency: \"feel like im staying out\"  Alleviations: none      Symptoms:   [] Palpitations:    [] Chest Discomfort:    [] Dizziness:    [] Presyncope:    [] Lightheadedness:   [] Syncope:    [x] Fatigue:    [] Other:    [x] Short of Breath:     Last Echo(s):  [x] TTE Date: 3/25/22               EF: 40-45%            LA: normal size            VHD trace AR, mod MR       Past medical History:   [] Diabetes No               No results found for: HGBA1C       [x] HYPOthyroidism  [] HYPERthyroidism      Tx synthroid        No results found for: TSH    [] HTN  No     [x] Heart Failure    [] CVA   No                          [] TIA  No        [] Ischemic         [] Hemorrhagic         [] Nonischemic         [] Embolic        [] Diastolic    [] CAD     No     [] MI  No           [x] Dyslipidemia on crestor     [x] Ischemic Evaluation       [x] Stress Test: 1/31/18, EF 70%, no ischemia        [] Heart Cath: No     [x] Sleep Apnea Diagnosed       Device: CPAP        Compliance: compliance all of the time    [] Obesity No BMI 28.45         [] Depression No   [] Anxiety No                  [] Urologic History No        [] Urologic cancer surgery         [] Severe decrease in flow of urine stream        [] " "Recent unexplained gross hematuria       [] Hx urethral stricture     Social / Lifestyle History:   [x]   Tobacco:                     [x] Former: stopped in 1976          [x]   Alcohol:          [x] Current: \"very little\"   [x]   Caffeine: 1 decaf cup per day   []   Recreational Drugs: Denies    []   Stress Issues: No    []   Exercise: none    Summary of Patient Contact:    I spoke with Mr. Melo about his upcoming PVA.   He was well informed about the procedure from prior discussion with Dr. Gupta and from reading the provided literature.  We discussed the procedure at length including risks, anesthesia, intra-op procedures, recovery, bedrest, sheath removal, discharge criteria, normal post-procedure expectations, and success rates.  I answered a few remaining questions. Mr. Melo verbalized understanding and he is ready to proceed.      Elvira Olvera RN                "

## 2022-06-24 ENCOUNTER — HOSPITAL ENCOUNTER (OUTPATIENT)
Dept: CT IMAGING | Facility: HOSPITAL | Age: 72
Discharge: HOME | End: 2022-06-24

## 2022-06-24 ENCOUNTER — PRE-ADMISSION TESTING (OUTPATIENT)
Dept: PREADMISSION TESTING | Facility: HOSPITAL | Age: 72
End: 2022-06-24

## 2022-06-24 DIAGNOSIS — I48.0 PAROXYSMAL ATRIAL FIBRILLATION: Primary | ICD-10-CM

## 2022-06-24 DIAGNOSIS — I48.0 PAROXYSMAL ATRIAL FIBRILLATION: ICD-10-CM

## 2022-06-24 LAB
ANION GAP SERPL CALCULATED.3IONS-SCNC: 8 MMOL/L (ref 5–15)
BUN SERPL-MCNC: 16 MG/DL (ref 8–23)
BUN/CREAT SERPL: 15.8 (ref 7–25)
CALCIUM SPEC-SCNC: 9.4 MG/DL (ref 8.6–10.5)
CHLORIDE SERPL-SCNC: 103 MMOL/L (ref 98–107)
CO2 SERPL-SCNC: 28 MMOL/L (ref 22–29)
CREAT SERPL-MCNC: 1.01 MG/DL (ref 0.76–1.27)
DEPRECATED RDW RBC AUTO: 42.2 FL (ref 37–54)
EGFRCR SERPLBLD CKD-EPI 2021: 79 ML/MIN/1.73
ERYTHROCYTE [DISTWIDTH] IN BLOOD BY AUTOMATED COUNT: 12.6 % (ref 12.3–15.4)
GLUCOSE SERPL-MCNC: 94 MG/DL (ref 65–99)
HBA1C MFR BLD: 5.9 % (ref 4.8–5.6)
HCT VFR BLD AUTO: 49.7 % (ref 37.5–51)
HGB BLD-MCNC: 16.4 G/DL (ref 13–17.7)
INR PPP: 1.23 (ref 0.84–1.13)
MCH RBC QN AUTO: 30.3 PG (ref 26.6–33)
MCHC RBC AUTO-ENTMCNC: 33 G/DL (ref 31.5–35.7)
MCV RBC AUTO: 91.7 FL (ref 79–97)
PLATELET # BLD AUTO: 198 10*3/MM3 (ref 140–450)
PMV BLD AUTO: 10 FL (ref 6–12)
POTASSIUM SERPL-SCNC: 4.4 MMOL/L (ref 3.5–5.2)
PROTHROMBIN TIME: 15.4 SECONDS (ref 11.4–14.4)
RBC # BLD AUTO: 5.42 10*6/MM3 (ref 4.14–5.8)
SARS-COV-2 RNA PNL SPEC NAA+PROBE: NOT DETECTED
SODIUM SERPL-SCNC: 139 MMOL/L (ref 136–145)
TSH SERPL DL<=0.05 MIU/L-ACNC: 3.72 UIU/ML (ref 0.27–4.2)
WBC NRBC COR # BLD: 8.65 10*3/MM3 (ref 3.4–10.8)

## 2022-06-24 PROCEDURE — U0004 COV-19 TEST NON-CDC HGH THRU: HCPCS

## 2022-06-24 PROCEDURE — 85610 PROTHROMBIN TIME: CPT

## 2022-06-24 PROCEDURE — 80048 BASIC METABOLIC PNL TOTAL CA: CPT

## 2022-06-24 PROCEDURE — 71275 CT ANGIOGRAPHY CHEST: CPT

## 2022-06-24 PROCEDURE — 36415 COLL VENOUS BLD VENIPUNCTURE: CPT

## 2022-06-24 PROCEDURE — 84443 ASSAY THYROID STIM HORMONE: CPT

## 2022-06-24 PROCEDURE — C9803 HOPD COVID-19 SPEC COLLECT: HCPCS

## 2022-06-24 PROCEDURE — 83036 HEMOGLOBIN GLYCOSYLATED A1C: CPT | Performed by: NURSE PRACTITIONER

## 2022-06-24 PROCEDURE — 0 IOPAMIDOL PER 1 ML: Performed by: STUDENT IN AN ORGANIZED HEALTH CARE EDUCATION/TRAINING PROGRAM

## 2022-06-24 PROCEDURE — 85027 COMPLETE CBC AUTOMATED: CPT

## 2022-06-24 RX ORDER — FLUTICASONE PROPIONATE 50 MCG
2 SPRAY, SUSPENSION (ML) NASAL AS NEEDED
COMMUNITY

## 2022-06-24 RX ADMIN — IOPAMIDOL 80 ML: 755 INJECTION, SOLUTION INTRAVENOUS at 15:06

## 2022-06-24 NOTE — DISCHARGE INSTRUCTIONS

## 2022-06-24 NOTE — PAT
An arrival time for procedure was not given during PAT visit. If patient had any questions or concerns about their arrival time, they were instructed to contact their surgeon/physician.  Additionally, if the patient referred to an arrival time that was acquired from their my chart account, patient was encouraged to verify that time with their surgeon/physician.  NO arrival times given in Pre Admission Testing Department.  Patient was called by office today and given a different time.  Instead of 6am supposed here at 8am on Monday.  But patient not sure if 8 am is arrival time or procedure time. Called and left message with Imani Colon response by the end of PAT visit.  Instructed patient to arrive at 8am as per phone call today from office unless told differently.    CT after PAT    COVID in PAT

## 2022-06-27 ENCOUNTER — HOSPITAL ENCOUNTER (OUTPATIENT)
Facility: HOSPITAL | Age: 72
Setting detail: HOSPITAL OUTPATIENT SURGERY
Discharge: HOME | End: 2022-06-27
Attending: STUDENT IN AN ORGANIZED HEALTH CARE EDUCATION/TRAINING PROGRAM | Admitting: STUDENT IN AN ORGANIZED HEALTH CARE EDUCATION/TRAINING PROGRAM

## 2022-06-27 ENCOUNTER — ANESTHESIA (OUTPATIENT)
Dept: CARDIOLOGY | Facility: HOSPITAL | Age: 72
End: 2022-06-27

## 2022-06-27 ENCOUNTER — ANESTHESIA EVENT (OUTPATIENT)
Dept: CARDIOLOGY | Facility: HOSPITAL | Age: 72
End: 2022-06-27

## 2022-06-27 VITALS
HEIGHT: 71 IN | SYSTOLIC BLOOD PRESSURE: 133 MMHG | WEIGHT: 205.47 LBS | HEART RATE: 76 BPM | BODY MASS INDEX: 28.77 KG/M2 | OXYGEN SATURATION: 95 % | TEMPERATURE: 98 F | RESPIRATION RATE: 16 BRPM | DIASTOLIC BLOOD PRESSURE: 84 MMHG

## 2022-06-27 DIAGNOSIS — I48.0 PAROXYSMAL ATRIAL FIBRILLATION: ICD-10-CM

## 2022-06-27 LAB
ACT BLD: 277 SECONDS (ref 82–152)
ACT BLD: 289 SECONDS (ref 82–152)
ACT BLD: 410 SECONDS (ref 82–152)

## 2022-06-27 PROCEDURE — 93655 ICAR CATH ABLTJ DSCRT ARRHYT: CPT | Performed by: STUDENT IN AN ORGANIZED HEALTH CARE EDUCATION/TRAINING PROGRAM

## 2022-06-27 PROCEDURE — 93656 COMPRE EP EVAL ABLTJ ATR FIB: CPT | Performed by: STUDENT IN AN ORGANIZED HEALTH CARE EDUCATION/TRAINING PROGRAM

## 2022-06-27 PROCEDURE — C1766 INTRO/SHEATH,STRBLE,NON-PEEL: HCPCS | Performed by: STUDENT IN AN ORGANIZED HEALTH CARE EDUCATION/TRAINING PROGRAM

## 2022-06-27 PROCEDURE — 25010000002 PHENYLEPHRINE 10 MG/ML SOLUTION 1 ML VIAL: Performed by: NURSE ANESTHETIST, CERTIFIED REGISTERED

## 2022-06-27 PROCEDURE — C1894 INTRO/SHEATH, NON-LASER: HCPCS | Performed by: STUDENT IN AN ORGANIZED HEALTH CARE EDUCATION/TRAINING PROGRAM

## 2022-06-27 PROCEDURE — 0 LIDOCAINE 1 % SOLUTION: Performed by: NURSE ANESTHETIST, CERTIFIED REGISTERED

## 2022-06-27 PROCEDURE — 93623 PRGRMD STIMJ&PACG IV RX NFS: CPT | Performed by: STUDENT IN AN ORGANIZED HEALTH CARE EDUCATION/TRAINING PROGRAM

## 2022-06-27 PROCEDURE — C1760 CLOSURE DEV, VASC: HCPCS | Performed by: STUDENT IN AN ORGANIZED HEALTH CARE EDUCATION/TRAINING PROGRAM

## 2022-06-27 PROCEDURE — 93622 COMP EP EVAL L VENTR PAC&REC: CPT | Performed by: STUDENT IN AN ORGANIZED HEALTH CARE EDUCATION/TRAINING PROGRAM

## 2022-06-27 PROCEDURE — 25010000002 ADENOSINE PER 6 MG: Performed by: STUDENT IN AN ORGANIZED HEALTH CARE EDUCATION/TRAINING PROGRAM

## 2022-06-27 PROCEDURE — 85347 COAGULATION TIME ACTIVATED: CPT

## 2022-06-27 PROCEDURE — 25010000002 PROTAMINE SULFATE PER 10 MG: Performed by: STUDENT IN AN ORGANIZED HEALTH CARE EDUCATION/TRAINING PROGRAM

## 2022-06-27 PROCEDURE — C1893 INTRO/SHEATH, FIXED,NON-PEEL: HCPCS | Performed by: STUDENT IN AN ORGANIZED HEALTH CARE EDUCATION/TRAINING PROGRAM

## 2022-06-27 PROCEDURE — 25010000002 DEXAMETHASONE PER 1 MG: Performed by: NURSE ANESTHETIST, CERTIFIED REGISTERED

## 2022-06-27 PROCEDURE — C1732 CATH, EP, DIAG/ABL, 3D/VECT: HCPCS | Performed by: STUDENT IN AN ORGANIZED HEALTH CARE EDUCATION/TRAINING PROGRAM

## 2022-06-27 PROCEDURE — C1730 CATH, EP, 19 OR FEW ELECT: HCPCS | Performed by: STUDENT IN AN ORGANIZED HEALTH CARE EDUCATION/TRAINING PROGRAM

## 2022-06-27 PROCEDURE — 25010000002 PROPOFOL 10 MG/ML EMULSION: Performed by: NURSE ANESTHETIST, CERTIFIED REGISTERED

## 2022-06-27 PROCEDURE — 25010000002 HEPARIN (PORCINE) PER 1000 UNITS: Performed by: STUDENT IN AN ORGANIZED HEALTH CARE EDUCATION/TRAINING PROGRAM

## 2022-06-27 PROCEDURE — C1759 CATH, INTRA ECHOCARDIOGRAPHY: HCPCS | Performed by: STUDENT IN AN ORGANIZED HEALTH CARE EDUCATION/TRAINING PROGRAM

## 2022-06-27 PROCEDURE — 25010000002 ONDANSETRON PER 1 MG: Performed by: NURSE ANESTHETIST, CERTIFIED REGISTERED

## 2022-06-27 RX ORDER — FENTANYL CITRATE 50 UG/ML
50 INJECTION, SOLUTION INTRAMUSCULAR; INTRAVENOUS
Status: DISCONTINUED | OUTPATIENT
Start: 2022-06-27 | End: 2022-06-27 | Stop reason: HOSPADM

## 2022-06-27 RX ORDER — PROPOFOL 10 MG/ML
VIAL (ML) INTRAVENOUS AS NEEDED
Status: DISCONTINUED | OUTPATIENT
Start: 2022-06-27 | End: 2022-06-27 | Stop reason: SURG

## 2022-06-27 RX ORDER — IBUPROFEN 200 MG
400 TABLET ORAL EVERY 6 HOURS PRN
Status: DISCONTINUED | OUTPATIENT
Start: 2022-06-27 | End: 2022-06-27 | Stop reason: HOSPADM

## 2022-06-27 RX ORDER — SODIUM CHLORIDE, SODIUM LACTATE, POTASSIUM CHLORIDE, CALCIUM CHLORIDE 600; 310; 30; 20 MG/100ML; MG/100ML; MG/100ML; MG/100ML
125 INJECTION, SOLUTION INTRAVENOUS ONCE
Status: DISCONTINUED | OUTPATIENT
Start: 2022-06-27 | End: 2022-06-27 | Stop reason: HOSPADM

## 2022-06-27 RX ORDER — SODIUM CHLORIDE 0.9 % (FLUSH) 0.9 %
10 SYRINGE (ML) INJECTION AS NEEDED
Status: DISCONTINUED | OUTPATIENT
Start: 2022-06-27 | End: 2022-06-27 | Stop reason: HOSPADM

## 2022-06-27 RX ORDER — HYDROMORPHONE HYDROCHLORIDE 1 MG/ML
0.5 INJECTION, SOLUTION INTRAMUSCULAR; INTRAVENOUS; SUBCUTANEOUS
Status: DISCONTINUED | OUTPATIENT
Start: 2022-06-27 | End: 2022-06-27 | Stop reason: HOSPADM

## 2022-06-27 RX ORDER — SODIUM CHLORIDE 0.9 % (FLUSH) 0.9 %
10 SYRINGE (ML) INJECTION EVERY 12 HOURS SCHEDULED
Status: DISCONTINUED | OUTPATIENT
Start: 2022-06-27 | End: 2022-06-27 | Stop reason: HOSPADM

## 2022-06-27 RX ORDER — FENTANYL CITRATE 50 UG/ML
50 INJECTION, SOLUTION INTRAMUSCULAR; INTRAVENOUS
Status: DISCONTINUED | OUTPATIENT
Start: 2022-06-27 | End: 2022-06-27 | Stop reason: SDUPTHER

## 2022-06-27 RX ORDER — LIDOCAINE HYDROCHLORIDE 20 MG/ML
JELLY TOPICAL AS NEEDED
Status: DISCONTINUED | OUTPATIENT
Start: 2022-06-27 | End: 2022-06-27 | Stop reason: SURG

## 2022-06-27 RX ORDER — SODIUM CHLORIDE 9 MG/ML
INJECTION, SOLUTION INTRAVENOUS CONTINUOUS PRN
Status: DISCONTINUED | OUTPATIENT
Start: 2022-06-27 | End: 2022-06-27 | Stop reason: SURG

## 2022-06-27 RX ORDER — PROTAMINE SULFATE 10 MG/ML
INJECTION, SOLUTION INTRAVENOUS AS NEEDED
Status: DISCONTINUED | OUTPATIENT
Start: 2022-06-27 | End: 2022-06-27 | Stop reason: HOSPADM

## 2022-06-27 RX ORDER — LIDOCAINE HYDROCHLORIDE 10 MG/ML
INJECTION, SOLUTION INFILTRATION; PERINEURAL AS NEEDED
Status: DISCONTINUED | OUTPATIENT
Start: 2022-06-27 | End: 2022-06-27 | Stop reason: SURG

## 2022-06-27 RX ORDER — ACETAMINOPHEN 325 MG/1
650 TABLET ORAL EVERY 4 HOURS PRN
Status: DISCONTINUED | OUTPATIENT
Start: 2022-06-27 | End: 2022-06-27 | Stop reason: HOSPADM

## 2022-06-27 RX ORDER — BUPIVACAINE HCL/0.9 % NACL/PF 0.125 %
PLASTIC BAG, INJECTION (ML) EPIDURAL AS NEEDED
Status: DISCONTINUED | OUTPATIENT
Start: 2022-06-27 | End: 2022-06-27 | Stop reason: SURG

## 2022-06-27 RX ORDER — ROCURONIUM BROMIDE 10 MG/ML
INJECTION, SOLUTION INTRAVENOUS AS NEEDED
Status: DISCONTINUED | OUTPATIENT
Start: 2022-06-27 | End: 2022-06-27 | Stop reason: SURG

## 2022-06-27 RX ORDER — SODIUM CHLORIDE 0.9 % (FLUSH) 0.9 %
3 SYRINGE (ML) INJECTION EVERY 12 HOURS SCHEDULED
Status: DISCONTINUED | OUTPATIENT
Start: 2022-06-27 | End: 2022-06-27 | Stop reason: HOSPADM

## 2022-06-27 RX ORDER — MIDAZOLAM HYDROCHLORIDE 1 MG/ML
0.5 INJECTION INTRAMUSCULAR; INTRAVENOUS
Status: DISCONTINUED | OUTPATIENT
Start: 2022-06-27 | End: 2022-06-27 | Stop reason: HOSPADM

## 2022-06-27 RX ORDER — HEPARIN SODIUM 10000 [USP'U]/100ML
INJECTION, SOLUTION INTRAVENOUS CONTINUOUS PRN
Status: DISCONTINUED | OUTPATIENT
Start: 2022-06-27 | End: 2022-06-27 | Stop reason: HOSPADM

## 2022-06-27 RX ORDER — ONDANSETRON 2 MG/ML
4 INJECTION INTRAMUSCULAR; INTRAVENOUS ONCE AS NEEDED
Status: DISCONTINUED | OUTPATIENT
Start: 2022-06-27 | End: 2022-06-27 | Stop reason: HOSPADM

## 2022-06-27 RX ORDER — HYDROMORPHONE HYDROCHLORIDE 1 MG/ML
0.5 INJECTION, SOLUTION INTRAMUSCULAR; INTRAVENOUS; SUBCUTANEOUS
Status: DISCONTINUED | OUTPATIENT
Start: 2022-06-27 | End: 2022-06-27 | Stop reason: SDUPTHER

## 2022-06-27 RX ORDER — ADENOSINE 3 MG/ML
INJECTION, SOLUTION INTRAVENOUS AS NEEDED
Status: DISCONTINUED | OUTPATIENT
Start: 2022-06-27 | End: 2022-06-27 | Stop reason: HOSPADM

## 2022-06-27 RX ORDER — ONDANSETRON 2 MG/ML
INJECTION INTRAMUSCULAR; INTRAVENOUS AS NEEDED
Status: DISCONTINUED | OUTPATIENT
Start: 2022-06-27 | End: 2022-06-27 | Stop reason: SURG

## 2022-06-27 RX ORDER — ACETAMINOPHEN 650 MG/1
650 SUPPOSITORY RECTAL EVERY 4 HOURS PRN
Status: DISCONTINUED | OUTPATIENT
Start: 2022-06-27 | End: 2022-06-27 | Stop reason: HOSPADM

## 2022-06-27 RX ORDER — HEPARIN SODIUM 1000 [USP'U]/ML
INJECTION, SOLUTION INTRAVENOUS; SUBCUTANEOUS AS NEEDED
Status: DISCONTINUED | OUTPATIENT
Start: 2022-06-27 | End: 2022-06-27 | Stop reason: HOSPADM

## 2022-06-27 RX ORDER — DEXAMETHASONE SODIUM PHOSPHATE 4 MG/ML
INJECTION, SOLUTION INTRA-ARTICULAR; INTRALESIONAL; INTRAMUSCULAR; INTRAVENOUS; SOFT TISSUE AS NEEDED
Status: DISCONTINUED | OUTPATIENT
Start: 2022-06-27 | End: 2022-06-27 | Stop reason: SURG

## 2022-06-27 RX ADMIN — ROCURONIUM BROMIDE 10 MG: 10 INJECTION INTRAVENOUS at 12:02

## 2022-06-27 RX ADMIN — LIDOCAINE HYDROCHLORIDE 3 ML: 20 JELLY TOPICAL at 11:11

## 2022-06-27 RX ADMIN — SODIUM CHLORIDE: 9 INJECTION, SOLUTION INTRAVENOUS at 11:02

## 2022-06-27 RX ADMIN — DEXAMETHASONE SODIUM PHOSPHATE 8 MG: 4 INJECTION INTRA-ARTICULAR; INTRALESIONAL; INTRAMUSCULAR; INTRAVENOUS; SOFT TISSUE at 11:13

## 2022-06-27 RX ADMIN — Medication 80 MCG: at 11:17

## 2022-06-27 RX ADMIN — ROCURONIUM BROMIDE 10 MG: 10 INJECTION INTRAVENOUS at 12:57

## 2022-06-27 RX ADMIN — IBUPROFEN 400 MG: 200 TABLET, FILM COATED ORAL at 14:55

## 2022-06-27 RX ADMIN — PROPOFOL 150 MG: 10 INJECTION, EMULSION INTRAVENOUS at 11:09

## 2022-06-27 RX ADMIN — ONDANSETRON 4 MG: 2 INJECTION INTRAMUSCULAR; INTRAVENOUS at 13:38

## 2022-06-27 RX ADMIN — SUGAMMADEX 400 MG: 100 INJECTION, SOLUTION INTRAVENOUS at 13:45

## 2022-06-27 RX ADMIN — ROCURONIUM BROMIDE 20 MG: 10 INJECTION INTRAVENOUS at 12:25

## 2022-06-27 RX ADMIN — ROCURONIUM BROMIDE 40 MG: 10 INJECTION INTRAVENOUS at 11:09

## 2022-06-27 RX ADMIN — LIDOCAINE HYDROCHLORIDE 50 MG: 10 INJECTION, SOLUTION INFILTRATION; PERINEURAL at 11:09

## 2022-06-27 RX ADMIN — ROCURONIUM BROMIDE 10 MG: 10 INJECTION INTRAVENOUS at 11:32

## 2022-06-27 RX ADMIN — PHENYLEPHRINE HYDROCHLORIDE 0.1 MCG/MIN: 10 INJECTION INTRAVENOUS at 11:30

## 2022-06-27 NOTE — H&P
[facility] Consult/H&P Note      Referring Provider: Benedicto Gupta MD  Primary Provider:  Sabrina Ruth APRN  Reason for Consultation: Afib/flutter    PROBLEM LIST:  1. Atrial flutter  Atrial fibrillation  a. Diagnosed 12/2021. Started on amiodarone.   b. CHADSVASC = 2, on Eliquis  c. Echo 1/27/22: EF 55-60%, grade III diastolic dysfunction, mild restrictive physiology, LA mild to moderately dilated RA mildly enlarged. RV upper limits of normal. Mild MR. Trivial AI. RVSP 20.  d. Monitor 12/20-1/2/2022: 2% Afib/flutter. (70)bpm.   e. Echo 3/2022: EF 40-45%. Mild aortic sclerosis with trace AI, moderate MR, mild thickening of mitral valve leaflets.   f. S/p successful ECV, 3/2022.   g. Recurrent Aflutter noted 5/2022  h. S/p successful ECV, 5/26/22.  2. Systolic heart failure  a. Echo 1/27/22: EF 55-60%, grade III diastolic dysfunction,  b. Echo 3/2022: EF 40-45%.   3. Hyperlipidemia  4. Obstructive sleep apnea   a. Compliant with CPAP      HISTORY OF PRESENT ILLNESS:  Clarence Melo is a 72 y.o. male with the above noted pmhx who presents today for atrial flutter and atrial fibrillation ablation. He has a known history of Afib/flutter, diagnosed 12/2021. He was started on amiodarone at time of diagnosis but had recurrent Afib/flutter requiring at least two cardioversions as detailed above. He was seen in consultation by Dr. Gupta on 5/20/22 at which time he opted to proceed with ablations given his frequent recurrences. He has remained on amiodarone with the plans of discontinuing this after his ablations. He is anticoagulated with Eliquis and denies any missed doses over the last 30 days with the exception of last nights dose.       REVIEW OF SYSTEMS  Pertinent positives are listed in the HPI and all other ROS are negative.      SOCIAL HISTORY:   reports that he quit smoking about 46 years ago. His smoking use included cigarettes. He has a 2.00 pack-year smoking history. He has never used  smokeless tobacco. He reports previous alcohol use of about 1.0 standard drink of alcohol per week. He reports that he does not use drugs.     FAMILY HISTORY:  family history includes Alzheimer's disease in an other family member; Dementia in his mother; Heart disease in his father; Heart failure in an other family member; Hyperlipidemia in his father; No Known Problems in his brother, maternal aunt, maternal grandfather, maternal grandmother, maternal uncle, paternal aunt, paternal grandfather, paternal grandmother, paternal uncle, and sister.     CURRENT MEDICATIONS:      Current Facility-Administered Medications:   •  acetaminophen (TYLENOL) tablet 650 mg, 650 mg, Oral, Q4H PRN, Winifred Hernandez APRN  •  lactated ringers infusion, 125 mL/hr, Intravenous, Once, Luis Christensen MD  •  midazolam (VERSED) injection 0.5 mg, 0.5 mg, Intravenous, Q10 Min PRN, Luis Christensen MD  •  sodium chloride 0.9 % flush 10 mL, 10 mL, Intravenous, PRN, Winifred Hernandez APRN  •  sodium chloride 0.9 % flush 10 mL, 10 mL, Intravenous, Q12H, Luis Christensen MD  •  sodium chloride 0.9 % flush 10 mL, 10 mL, Intravenous, PRN, Luis Christensen MD  •  sodium chloride 0.9 % flush 3 mL, 3 mL, Intravenous, Q12H, Winifred Hernandez APRN     Allergies:  Patient has no known allergies.    Objective     Vital Sign Min/Max for last 24 hours  No data recorded   No data recorded   No data recorded   No data recorded   No data recorded   No data recorded   No data recorded         PHYSICAL EXAM:  GENERAL: This is a well-developed, well-nourished, male who is in no acute distress.   SKIN: Pink and warm without rash or abnormality noted.   HEENT: Head is normocephalic and atraumatic. Pupils are equal and reactive to light bilaterally. Mucous membranes are pink and moist.   NECK: Supple without lymphadenopathy or thyromegaly. There is no jugular venous distention at 30°.  LUNGS: Clear to auscultation bilaterally without wheezing, rhonchi, or rales noted.    CARDIOVASCULAR: The heart has a regular rhythm, tachy rate with a normal S1 and S2. There is no murmur, gallop, rub, or click appreciated. The PMI is nondisplaced. Carotid upstrokes are 2+ and symmetrical without bruits.   ABDOMEN: Soft and nondistended with positive bowel sounds x4. The patient denies tenderness of palpitation.   MUSCULOSKELETAL: There are no obvious bony abnormalities. Normal range of tenderness to palpation.   NEUROLOGICAL: Nonfocal. Alert and oriented x3.   PERIPHERAL VASCULAR: Femoral pulses are 2+ and symmetrical without bruits. Posterior tibial and dorsalis pedis pulses are 2+ and symmetrical. There is no peripheral edema.   PSYCH: Normal mood and affect.      EKG:    Tele: Aflutter 120's    LABS:      Lab Results   Component Value Date    WBC 8.65 06/24/2022    HGB 16.4 06/24/2022    HCT 49.7 06/24/2022    MCV 91.7 06/24/2022     06/24/2022     Lab Results   Component Value Date    GLUCOSE 94 06/24/2022    BUN 16 06/24/2022    CREATININE 1.01 06/24/2022    BCR 15.8 06/24/2022    K 4.4 06/24/2022    CO2 28.0 06/24/2022    CALCIUM 9.4 06/24/2022     No results found for: CKTOTAL, CKMB, CKMBINDEX, TROPONINI, TROPONINT  Lab Results   Component Value Date    INR 1.23 (H) 06/24/2022    PROTIME 15.4 (H) 06/24/2022     No results found for: CHOL, CHLPL, TRIG, HDL, LDL, LDLDIRECT     Results Review: I reviewed the patient's new clinical results.      ASSESSMENT:    1.  Atrial flutter  Atrial fibrillation  a. Diagnosed 12/2021. Started on amiodarone.   b. CHADSVASC = 2, on Eliquis  c. Echo 1/27/22: EF 55-60%, grade III diastolic dysfunction, mild restrictive physiology, LA mild to moderately dilated RA mildly enlarged. RV upper limits of normal. Mild MR. Trivial AI. RVSP 20.  d. Monitor 12/20-1/2/2022: 2% Afib/flutter. (70)bpm.   e. Echo 3/2022: EF 40-45%. Mild aortic sclerosis with trace AI, moderate MR, mild thickening of mitral valve leaflets.   f. S/p successful ECV, 3/2022.    g. Recurrent Aflutter noted 5/2022  h. S/p successful ECV, 5/26/22.  i. Symptomatic with fatigue, shortness of breath.   Patient presents today for typical appearing atrial flutter and atrial fibrillation ablation. The risks, benefits, and alternatives of the procedure have been reviewed and the patient wishes to proceed.   PLAN:    1. Proceed as planned.         Electronically signed by ANTHONY Roldan, 06/27/22, 8:27 AM EDT.

## 2022-06-27 NOTE — ANESTHESIA POSTPROCEDURE EVALUATION
Patient: Clarence Melo    Procedure Summary     Date: 06/27/22 Room / Location: [facility] / [facility]ASIVE LOCATION    Anesthesia Start: 1102 Anesthesia Stop: 1406    Procedure: Ablation atrial fibrillation. Hold Eliquis night before procedure (N/A ) Diagnosis:       Paroxysmal atrial fibrillation (HCC)      (Afib,)    Providers: Benedicto Gupta MD Provider: Juan Asif Jr., MD    Anesthesia Type: general ASA Status: 3          Anesthesia Type: general    Vitals  Vitals Value Taken Time   /77 06/27/22 1405   Temp 98.1 °F (36.7 °C) 06/27/22 1405   Pulse 71 06/27/22 1405   Resp 18 06/27/22 1405   SpO2 93 % 06/27/22 1405           Post Anesthesia Care and Evaluation    Patient location during evaluation: PACU  Patient participation: complete - patient participated  Level of consciousness: awake and alert  Pain management: adequate    Airway patency: patent  Anesthetic complications: No anesthetic complications  PONV Status: none  Cardiovascular status: hemodynamically stable and acceptable  Respiratory status: nonlabored ventilation, acceptable and nasal cannula  Hydration status: acceptable

## 2022-06-27 NOTE — ANESTHESIA PROCEDURE NOTES
Airway  Urgency: elective    Date/Time: 6/27/2022 11:11 AM  Airway not difficult    General Information and Staff    Patient location during procedure: OR  CRNA/CAA: Lizet Juarez CRNA    Indications and Patient Condition  Indications for airway management: airway protection    Preoxygenated: yes  MILS not maintained throughout  Mask difficulty assessment: 1 - vent by mask    Final Airway Details  Final airway type: endotracheal airway      Successful airway: ETT  Cuffed: yes   Successful intubation technique: direct laryngoscopy  Facilitating devices/methods: intubating stylet  Endotracheal tube insertion site: oral  Blade: Buster  Blade size: 4  ETT size (mm): 7.5  Cormack-Lehane Classification: grade I - full view of glottis  Placement verified by: chest auscultation and capnometry   Measured from: lips  ETT/EBT  to lips (cm): 22  Number of attempts at approach: 1  Assessment: lips, teeth, and gum same as pre-op and atraumatic intubation    Additional Comments  Symmetric chest rise and fall. +ETCO2 +BBS.

## 2022-06-27 NOTE — ANESTHESIA PREPROCEDURE EVALUATION
Anesthesia Evaluation     Patient summary reviewed and Nursing notes reviewed   no history of anesthetic complications:  NPO Solid Status: > 8 hours  NPO Liquid Status: > 2 hours           Airway   Mallampati: II  TM distance: >3 FB  Neck ROM: full  No difficulty expected  Dental - normal exam     Pulmonary - normal exam   (+) a smoker Former, sleep apnea on CPAP,   Cardiovascular - normal exam    (+) hypertension, valvular problems/murmurs (mild to moderate MR) MR, dysrhythmias Atrial Fib, hyperlipidemia,   (-) CHF    ROS comment: TTE from 12/2021 with normal LVEF. Mild to moderate MR    Neuro/Psych  (-) seizures, CVA  GI/Hepatic/Renal/Endo    (+)   thyroid problem     Musculoskeletal     Abdominal    Substance History      OB/GYN          Other                        Anesthesia Plan    ASA 3     general     intravenous induction     Anesthetic plan, risks, benefits, and alternatives have been provided, discussed and informed consent has been obtained with: patient.    Plan discussed with CRNA.        CODE STATUS:

## 2022-06-28 ENCOUNTER — CALL CENTER PROGRAMS (OUTPATIENT)
Dept: CALL CENTER | Facility: HOSPITAL | Age: 72
End: 2022-06-28

## 2022-06-28 NOTE — OUTREACH NOTE
PCI/Device Survey    Flowsheet Row Responses   Facility patient discharged from? [facility]   Procedure date 06/27/22   Procedure (if device, specify in description) PCI  [Ablation atrial fibrillation]   PCI site Right, Left, Groin   Performing MD Other (annotate)  [Dr Gupta]   Attempt successful? Yes   Call start time 1220   Call end time 1231   Person spoke with today (if not patient) and relationship patient   Has the patient had any of the following symptoms since discharge? --  [Denies any symptoms today. ]   Is the patient taking prescribed medications: --  [eliquis]   Nursing intervention Reminded to continue to take prescribed medications  [Stop amiodarone]   Does the patient have any of the following symptoms related to the cath/surgical site? --  [Reports dressings are still intact and clean. ]   Does the patient have an appointment scheduled with the cardiologist? Yes   Appointment comments New Patient with Radha Guillermo, ANTHONY Jul 27, 2022 11:00 AM, MARCOS Jackson 13, 2022 3:15 PM, Benedicto Gupta MD Oct 21, 2022 11:30 AM    If the patient is a current smoker, are they able to teach back resources for cessation? Not a smoker   Did the patient feel prepared to go home on the same day as the procedure? Yes   Is the patient satisfied with the same day discharge process? Yes   PCI/Device call completed Yes   Wrap up additional comments Patient reports that he did well overnight, resting today. Denies questions or needs.           DILIA MEYER - Registered Nurse

## 2022-07-21 ENCOUNTER — TELEPHONE (OUTPATIENT)
Dept: CARDIOLOGY | Facility: HOSPITAL | Age: 72
End: 2022-07-21

## 2022-07-21 ENCOUNTER — HOSPITAL ENCOUNTER (OUTPATIENT)
Dept: CARDIOLOGY | Facility: HOSPITAL | Age: 72
Discharge: HOME | End: 2022-07-21

## 2022-07-21 ENCOUNTER — OFFICE VISIT (OUTPATIENT)
Dept: CARDIOLOGY | Facility: HOSPITAL | Age: 72
End: 2022-07-21

## 2022-07-21 VITALS
DIASTOLIC BLOOD PRESSURE: 79 MMHG | SYSTOLIC BLOOD PRESSURE: 101 MMHG | HEIGHT: 71 IN | BODY MASS INDEX: 28.28 KG/M2 | WEIGHT: 202 LBS | HEART RATE: 76 BPM | RESPIRATION RATE: 20 BRPM | TEMPERATURE: 97.6 F | OXYGEN SATURATION: 98 %

## 2022-07-21 DIAGNOSIS — Z98.890 HISTORY OF CARDIAC RADIOFREQUENCY ABLATION (RFA): ICD-10-CM

## 2022-07-21 DIAGNOSIS — I48.92 ATRIAL FLUTTER, PAROXYSMAL: ICD-10-CM

## 2022-07-21 DIAGNOSIS — I34.0 NONRHEUMATIC MITRAL VALVE REGURGITATION: ICD-10-CM

## 2022-07-21 DIAGNOSIS — I48.0 PAROXYSMAL ATRIAL FIBRILLATION: Primary | ICD-10-CM

## 2022-07-21 DIAGNOSIS — I10 ESSENTIAL HYPERTENSION: ICD-10-CM

## 2022-07-21 DIAGNOSIS — I48.0 PAROXYSMAL ATRIAL FIBRILLATION: ICD-10-CM

## 2022-07-21 DIAGNOSIS — I50.20 HEART FAILURE WITH REDUCED EJECTION FRACTION: ICD-10-CM

## 2022-07-21 DIAGNOSIS — G47.33 OSA (OBSTRUCTIVE SLEEP APNEA): ICD-10-CM

## 2022-07-21 LAB
QT INTERVAL: 358 MS
QTC INTERVAL: 540 MS

## 2022-07-21 PROCEDURE — 99214 OFFICE O/P EST MOD 30 MIN: CPT | Performed by: NURSE PRACTITIONER

## 2022-07-21 PROCEDURE — 63710000001 METOPROLOL TARTRATE 25 MG TABLET

## 2022-07-21 PROCEDURE — A9270 NON-COVERED ITEM OR SERVICE: HCPCS

## 2022-07-21 PROCEDURE — 93010 ELECTROCARDIOGRAM REPORT: CPT | Performed by: INTERNAL MEDICINE

## 2022-07-21 PROCEDURE — 93225 XTRNL ECG REC<48 HRS REC: CPT

## 2022-07-21 PROCEDURE — 93005 ELECTROCARDIOGRAM TRACING: CPT | Performed by: NURSE PRACTITIONER

## 2022-07-21 RX ORDER — ACETAMINOPHEN 500 MG
1000 TABLET ORAL EVERY 6 HOURS PRN
COMMUNITY

## 2022-07-21 NOTE — PROGRESS NOTES
"Patient with history of atrial flutter/atrial fibrillation status post PVA 5/20/2022 [facility], [facility]    Chief Complaint  Atrial Fibrillation and Establish Care (Post pva)    Subjective    History of Present Illness {CC  Problem List  Visit  Diagnosis   Encounters  Notes  Medications  Labs  Result Review Imaging  Media :23}     Clarence Melo presents to [facility] for   History of Present Illness     72-year-old male with history of atrial flutter/atrial fibrillation, heart failure with reduced EF, hyperlipidemia, CARLOS ALBERTO compliant with CPAP.    Patient diagnosed with A. fib/atrial flutter December 2021.  Had been on amiodarone with recurrence.  Patient with cardioversions.  Patient presented for PVA 06/27/22.  Discharged on beta-blocker and Eliquis.  Amiodarone was discontinued prior to procedure.    PNA on 6/30.  ? aspiration PNA.  Treated with steriods and abx.     Intermittent elevated HR over the last 2 days.  Continue BB and Eliquis.      NO CP or pressure.  Improved dyspnea since PvA.  Has not worsened the last few days.  NO cough, no fevers, chills, n/v.  No current dizziness, near syncope, syncope, edema.      At home with controlled HR his -140's..      Objective     Vital Signs:   Vitals:    07/21/22 1038 07/21/22 1039 07/21/22 1041 07/21/22 1118   BP: 109/67 102/78 101/79    BP Location: Right arm Left arm Left arm    Patient Position: Sitting Standing Sitting    Cuff Size: Adult Adult Adult    Pulse: (!) 136 (!) 138 (!) 137 76   Resp:   20    Temp:   97.6 °F (36.4 °C)    TempSrc:   Temporal    SpO2: 98% 98% 98%    Weight:   91.6 kg (202 lb)    Height:   180.3 cm (71\")      Body mass index is 28.17 kg/m².  Physical Exam  Constitutional:       General: He is not in acute distress.     Appearance: Normal appearance.   Cardiovascular:      Rate and Rhythm: Regular rhythm. Tachycardia present.      Pulses:           " Radial pulses are 2+ on the right side.        Dorsalis pedis pulses are 2+ on the right side.        Posterior tibial pulses are 2+ on the right side.      Heart sounds: Normal heart sounds.   Pulmonary:      Effort: Pulmonary effort is normal.      Breath sounds: Normal breath sounds.   Abdominal:      Palpations: Abdomen is soft.      Tenderness: There is no abdominal tenderness.   Musculoskeletal:      Right lower leg: No edema.      Left lower leg: No edema.   Skin:     General: Skin is warm and dry.   Neurological:      Mental Status: He is alert.   Psychiatric:         Mood and Affect: Mood normal.         Behavior: Behavior is cooperative.              Result Review  Data Reviewed:{ Labs  Result Review  Imaging  Med Tab  Media :23}     Echocardiogram 3/25/2022: EF 40 to 45%, mod MR    Lab Results   Component Value Date    WBC 8.65 06/24/2022    HGB 16.4 06/24/2022    HCT 49.7 06/24/2022    MCV 91.7 06/24/2022     06/24/2022     Lab Results   Component Value Date    GLUCOSE 94 06/24/2022    CALCIUM 9.4 06/24/2022     06/24/2022    K 4.4 06/24/2022    CO2 28.0 06/24/2022     06/24/2022    BUN 16 06/24/2022    CREATININE 1.01 06/24/2022    BCR 15.8 06/24/2022    ANIONGAP 8.0 06/24/2022     Lab Results   Component Value Date    TSH 3.720 06/24/2022                   Assessment and Plan {CC Problem List  Visit Diagnosis  ROS  Review (Popup)  Health Maintenance  Quality  BestPractice  Medications  SmartSets  SnapShot Encounters  Media :23}   1. Paroxysmal atrial fibrillation (HCC)    - ECG 12 Lead; appears to be atrial flutter 137 bpm    - Holter Monitor - 48 Hour; assess burden    Continue BB  Given Metoprolol 25 mg today in clinic    Will f/u with Dr. Gupta to discuss POC.  ? AAD (sotalol) and scheduing ECV if continues in afib/flutter 3 weeks post PVA      2. Atrial flutter, paroxysmal (HCC)    Atrial flutter recurrence noted 3 weeks after PVA.  Should be noted patient was  diagnosed with aspiration pneumonia 2 weeks ago.  Completed antibiotic and prednisone last week.    Asymptomatic today unaware of tachycardia or palpitations.  Only notices rapid heart rate on Fitbit.  Heart rate varies from controlled rate to rapid rate.    - ECG 12 Lead; Future  - Holter Monitor - 48 Hour; Future    3. Essential hypertension  BP controlled today    4. CARLOS ALBERTO (obstructive sleep apnea)  Compliant with CPap    5. History of cardiac radiofrequency ablation (RFA)  Discussed postprocedural expectations and management.  - ECG 12 Lead; Future  - Holter Monitor - 48 Hour; Future    6. Heart failure with reduced ejection fraction (HCC)  EF 40 to 45% in March.  Appears euvolemic.  Questionable related to arrhythmia with uncontrolled rates.  7. Nonrheumatic mitral valve regurgitation  Moderate MR    Euvolemic.  Reviewed signs and symptoms of heart failure worsening and when to call.      After reviewing plan of care with Dr. Gupta will determine follow-up schedule.          Follow Up {Instructions Charge Capture  Follow-up Communications :23}   No follow-ups on file.    Patient was given instructions and counseling regarding his condition or for health maintenance advice. Please see specific information pulled into the AVS if appropriate.  Patient was instructed to call the [facility] with any questions, concerns, or worsening symptoms.

## 2022-07-21 NOTE — TELEPHONE ENCOUNTER
Patient with history of PVA recently.  Patient in atrial flutter.  Reviewed plan of care with Dr. Gupta.    Patient will start sotalol 80 mg 1 tablet twice a day on Sunday, 7/24/2022.  Have EKG on Wednesday, 7/27/2022.  Patient will not continue metoprolol when initiating sotalol.    If patient continues to be in atrial flutter with repeat EKG we will consider ECV.    Patient and wife were able to teach back instructions.

## 2022-07-21 NOTE — PROGRESS NOTES
[facility] Heart Monitor Documentation    Clarence Melo  1950  [id number]  07/21/22      [] ZIO XT Patch  Model [id number] Prescribed for N/A Days    · Serial Number: (N + 9 Digits) N   · Apply-By Date on Box:   · USPS Tracking Number:   · USPS Tracking        [] [employer]ian MINI PLUS Mobile Cardiac Telemetry  Model BGMINIPLUS Prescribed for N/A Days    · Serial Number: (BGM + 7 Digits) BGM  · Shipped-By Date on Box:   · UPS Tracking Number: [id number]  · UPS Tracking      [] [employer] MINI Holter Monitor  Model BGMINIEL Prescribed for N/A Days    · Serial Number: (7 Digits) [id number]  · Shipped-By Date on Box: 199045  · UPS Tracking Number: [id number]  · UPS Tracking        This monitor was applied to the patient's chest and checked for proper functioning.  Mr. Clarence Melo was instructed in the proper use of this monitor.  He was given the opportunity to ask questions and left the office with the device 's instruction manual.    Keisha Trevino MA, 11:44 EDT, 07/21/22                  [facility] Hospital[facility] 8.8.2019

## 2022-07-21 NOTE — TELEPHONE ENCOUNTER
Please fax EKG order to primary care provider to be completed on 7/27/2022.  Please have them fax the EKG results back to our office for review.

## 2022-07-28 ENCOUNTER — TELEPHONE (OUTPATIENT)
Dept: CARDIOLOGY | Facility: HOSPITAL | Age: 72
End: 2022-07-28

## 2022-07-28 NOTE — TELEPHONE ENCOUNTER
Called patient to review:    Patient had worn a 48-hour Holter on 7/21/2022.  He was in atrial flutter 100% burden.  Average heart rate 129.      Sotalol recently initiated.  Repeat EKG after starting sotalol on 7/27/2022 showed sinus rhythm 64 bpm.  OK interval 180 ms,  ms,  ms.  Continue to monitor at this time.  EKG to be scanned into [employer].    Patient will keep follow-up with cardiology as scheduled.  Follow-up in the heart valve center as needed.

## 2022-08-18 PROCEDURE — 93227 XTRNL ECG REC<48 HR R&I: CPT | Performed by: STUDENT IN AN ORGANIZED HEALTH CARE EDUCATION/TRAINING PROGRAM

## 2022-09-12 DIAGNOSIS — I48.92 ATRIAL FLUTTER, PAROXYSMAL: ICD-10-CM

## 2022-09-12 DIAGNOSIS — I48.0 PAROXYSMAL ATRIAL FIBRILLATION: ICD-10-CM

## 2022-09-13 ENCOUNTER — OFFICE VISIT (OUTPATIENT)
Dept: CARDIOLOGY | Facility: CLINIC | Age: 72
End: 2022-09-13

## 2022-09-13 VITALS
WEIGHT: 201 LBS | OXYGEN SATURATION: 64 % | SYSTOLIC BLOOD PRESSURE: 133 MMHG | HEART RATE: 64 BPM | HEIGHT: 71 IN | BODY MASS INDEX: 28.14 KG/M2 | DIASTOLIC BLOOD PRESSURE: 81 MMHG

## 2022-09-13 DIAGNOSIS — I48.92 PAROXYSMAL ATRIAL FLUTTER: ICD-10-CM

## 2022-09-13 DIAGNOSIS — I48.0 PAROXYSMAL ATRIAL FIBRILLATION: ICD-10-CM

## 2022-09-13 DIAGNOSIS — I34.0 MITRAL VALVE INSUFFICIENCY, UNSPECIFIED ETIOLOGY: ICD-10-CM

## 2022-09-13 DIAGNOSIS — I10 ESSENTIAL HYPERTENSION: Primary | ICD-10-CM

## 2022-09-13 PROCEDURE — 99213 OFFICE O/P EST LOW 20 MIN: CPT | Performed by: PHYSICIAN ASSISTANT

## 2022-09-13 PROCEDURE — 93000 ELECTROCARDIOGRAM COMPLETE: CPT | Performed by: PHYSICIAN ASSISTANT

## 2022-09-13 RX ORDER — SOTALOL HYDROCHLORIDE 80 MG/1
80 TABLET ORAL 2 TIMES DAILY
Qty: 60 TABLET | Refills: 11 | Status: SHIPPED | OUTPATIENT
Start: 2022-09-13 | End: 2023-03-23

## 2022-09-13 RX ORDER — SOTALOL HYDROCHLORIDE 80 MG/1
TABLET ORAL
Qty: 30 TABLET | Refills: 6 | Status: SHIPPED | OUTPATIENT
Start: 2022-09-13 | End: 2022-09-13 | Stop reason: SDUPTHER

## 2022-09-13 NOTE — PROGRESS NOTES
Problem list     Subjective   Clarence Melo is a 72 y.o. male     Chief Complaint   Patient presents with   • Follow-up     Ablation June 27 th Dr Gupta   Problem list:     1. Low risk stress test 2016  1.1 low risk stress test January 2018  2. Preserved systolic function  3. Dyslipidemia  4. Obstructive sleep apnea being treated with CPAP  5.  Atrial fibrillation/flutter  5.1 diagnosed during ER visit and hospitalization at [facility] December 2021  5.2 recent electrocardioversion performed at [facility] in [place]  5.3 pulmonary vein ablation of atrial flutter June 2022  5.4 patient currently on sotalol therapy  6.  Mitral regurgitation  6.1 moderate mitral vegetation by echocardiogram March 2022  HPI    Patient is a 72-year-old male who presents to the office for evaluation.  Patient underwent EP study and ablation because of atrial flutter and failure of guideline directed therapy.  Patient did well initially.    Following the procedure and afterward, he developed pneumonia and apparently went back to see EP services and was in atrial flutter again.  He was placed on sotalol and converted to sinus rhythm    Since then, he has done better.  He has some fatigue but overall is doing much better.  He has no chest pain or pressure.  No complaints of any significant dyspnea.  No PND orthopnea.    Patient does not describe palpitating.  No dizziness, presyncope or syncope.  He has mild fatigue but otherwise doing well            Current Outpatient Medications on File Prior to Visit   Medication Sig Dispense Refill   • acetaminophen (TYLENOL) 500 MG tablet Take 1,000 mg by mouth Every 6 (Six) Hours As Needed for Mild Pain  or Headache.     • apixaban (ELIQUIS) 5 MG tablet tablet Take 1 tablet by mouth 2 (Two) Times a Day. 180 tablet 3   • Artificial Tear Ointment (DRY EYES OP) Apply 1 dose to eye(s) as directed by provider As Needed.     • CRESTOR 20 MG tablet Take 20 mg by mouth Every Night.      • fluticasone (FLONASE) 50 MCG/ACT nasal spray 2 sprays into the nostril(s) as directed by provider As Needed for Rhinitis.     • levothyroxine (SYNTHROID, LEVOTHROID) 25 MCG tablet Take 25 mcg by mouth Every Morning.     • loratadine (CLARITIN) 10 MG tablet Take 10 mg by mouth As Needed for Allergies.     • tadalafil (CIALIS) 5 MG tablet Take  by mouth Daily.     • [DISCONTINUED] sotalol (BETAPACE AF) 80 MG tablet tablet TAKE 1 TABLET BY MOUTH TWICE A DAY 30 tablet 6   • [DISCONTINUED] Sotalol HCl AF 80 MG tablet Take 1 tablet by mouth 2 (Two) Times a Day. 30 tablet 3     No current facility-administered medications on file prior to visit.       Patient has no known allergies.    Past Medical History:   Diagnosis Date   • Atrial fibrillation (HCC)    • COVID 2022    no hospitalization; mild symptoms   • Dry eyes    • Environmental allergies    • Hyperlipidemia    • Hypertension    • hypothyroidism    • Pre-diabetes     diet and exercise controlled   • Sleep apnea     has a c-pap       Social History     Socioeconomic History   • Marital status: Significant Other   Tobacco Use   • Smoking status: Former Smoker     Packs/day: 0.25     Years: 8.00     Pack years: 2.00     Types: Cigarettes     Quit date:      Years since quittin.7   • Smokeless tobacco: Never Used   Vaping Use   • Vaping Use: Never used   Substance and Sexual Activity   • Alcohol use: Not Currently     Alcohol/week: 1.0 standard drink     Types: 1 Cans of beer per week     Comment: socially   • Drug use: No   • Sexual activity: Defer       Family History   Problem Relation Age of Onset   • Dementia Mother    • Heart disease Father    • Hyperlipidemia Father    • No Known Problems Sister    • No Known Problems Brother    • No Known Problems Maternal Grandmother    • No Known Problems Maternal Grandfather    • No Known Problems Paternal Grandmother    • No Known Problems Paternal Grandfather    • No Known Problems Maternal Aunt    • No  "Known Problems Maternal Uncle    • No Known Problems Paternal Aunt    • No Known Problems Paternal Uncle    • Alzheimer's disease Other    • Heart failure Other    • Anemia Neg Hx    • Arrhythmia Neg Hx    • Asthma Neg Hx    • Clotting disorder Neg Hx    • Fainting Neg Hx    • Heart attack Neg Hx    • Hypertension Neg Hx        Review of Systems   Constitutional: Positive for fatigue. Negative for chills and fever.   HENT: Negative.  Negative for congestion and sinus pressure.    Respiratory: Positive for apnea. Negative for chest tightness and shortness of breath (with exertion).    Cardiovascular: Negative.  Negative for chest pain, palpitations and leg swelling.   Gastrointestinal: Negative.  Negative for abdominal pain, blood in stool, constipation, diarrhea, nausea and vomiting.   Genitourinary: Negative.  Negative for dysuria, frequency, hematuria and urgency.   Neurological: Negative.  Negative for dizziness, syncope and light-headedness.   Psychiatric/Behavioral: Positive for sleep disturbance (cpap, denies waking with soa or cp).       Objective   Vitals:    09/13/22 1458   BP: 133/81   BP Location: Left arm   Patient Position: Sitting   Pulse: 64   SpO2: (!) 64%   Weight: 91.2 kg (201 lb)   Height: 180.3 cm (71\")      /81 (BP Location: Left arm, Patient Position: Sitting)   Pulse 64   Ht 180.3 cm (71\")   Wt 91.2 kg (201 lb)   SpO2 (!) 64%   BMI 28.03 kg/m²     Lab Results (most recent)     None          Physical Exam  Vitals and nursing note reviewed.   Constitutional:       General: He is not in acute distress.     Appearance: Normal appearance. He is well-developed.   HENT:      Head: Normocephalic and atraumatic.   Eyes:      General: No scleral icterus.        Right eye: No discharge.         Left eye: No discharge.      Conjunctiva/sclera: Conjunctivae normal.   Neck:      Vascular: No carotid bruit.   Cardiovascular:      Rate and Rhythm: Normal rate and regular rhythm.      Heart sounds: " Normal heart sounds. No murmur heard.    No friction rub. No gallop.   Pulmonary:      Effort: Pulmonary effort is normal. No respiratory distress.      Breath sounds: Normal breath sounds. No wheezing or rales.   Chest:      Chest wall: No tenderness.   Musculoskeletal:      Right lower leg: No edema.      Left lower leg: No edema.   Skin:     General: Skin is warm and dry.      Coloration: Skin is not pale.      Findings: No erythema or rash.   Neurological:      Mental Status: He is alert and oriented to person, place, and time.      Cranial Nerves: No cranial nerve deficit.   Psychiatric:         Behavior: Behavior normal.         Procedure     ECG 12 Lead    Date/Time: 9/13/2022 3:22 PM  Performed by: Leandro Luz PA  Authorized by: Leandro Luz PA   Comparison: compared with previous ECG from 7/21/2022  Comments: EKG demonstrates sinus rhythm at 63 bpm with QTC estimated at 470 with no acute ST changes               Assessment & Plan     Problems Addressed this Visit        Cardiac and Vasculature    Essential hypertension - Primary    Relevant Medications    sotalol (BETAPACE AF) 80 MG tablet tablet    Mitral valve insufficiency    Relevant Medications    sotalol (BETAPACE AF) 80 MG tablet tablet    Paroxysmal atrial fibrillation (HCC)    Relevant Medications    sotalol (BETAPACE AF) 80 MG tablet tablet      Diagnoses       Codes Comments    Essential hypertension    -  Primary ICD-10-CM: I10  ICD-9-CM: 401.9     Paroxysmal atrial fibrillation (HCC)     ICD-10-CM: I48.0  ICD-9-CM: 427.31     Mitral valve insufficiency, unspecified etiology     ICD-10-CM: I34.0  ICD-9-CM: 424.0           Recommendation  1.  Patient is a 72-year-old male who underwent ablation of atrial flutter.  Patient doing well at this time on sotalol therapy.  QTC within reasonable limits for now we will make no changes.  Patient to see EP services next month    2.  Patient with moderate MR by echocardiogram.  Questionable  cardiomyopathy which may be false induced from patient's significant rapid ventricular response rates.  He had echocardiogram earlier in the year with normal systolic function.  Upon follow-up, may repeat echocardiogram to reassess mitral valve insufficiency as well as LV systolic function.    3.  Otherwise blood pressure currently stable on current medical regimen.  He is doing well otherwise.  We will see him back for follow-up in 6 months or sooner as symptoms discussed.  Follow-up with primary as scheduled           Clarence Melo  reports that he quit smoking about 46 years ago. His smoking use included cigarettes. He has a 2.00 pack-year smoking history. He has never used smokeless tobacco.    Patient brought in medicine bottles to appointment, they have been gone through with the patient. Med list was updated in the chart.     Advance Care Planning   ACP discussion was held with the patient during this visit. Patient does not have an advance directive, declines further assistance.     Electronically signed by:

## 2022-10-21 ENCOUNTER — OFFICE VISIT (OUTPATIENT)
Dept: CARDIOLOGY | Facility: CLINIC | Age: 72
End: 2022-10-21

## 2022-10-21 VITALS
SYSTOLIC BLOOD PRESSURE: 102 MMHG | BODY MASS INDEX: 29.29 KG/M2 | HEART RATE: 66 BPM | OXYGEN SATURATION: 95 % | WEIGHT: 209.2 LBS | DIASTOLIC BLOOD PRESSURE: 70 MMHG | HEIGHT: 71 IN

## 2022-10-21 DIAGNOSIS — G47.33 OSA (OBSTRUCTIVE SLEEP APNEA): ICD-10-CM

## 2022-10-21 DIAGNOSIS — R53.82 CHRONIC FATIGUE: ICD-10-CM

## 2022-10-21 DIAGNOSIS — I48.0 PAROXYSMAL ATRIAL FIBRILLATION: Primary | ICD-10-CM

## 2022-10-21 PROCEDURE — 99213 OFFICE O/P EST LOW 20 MIN: CPT | Performed by: NURSE PRACTITIONER

## 2022-10-21 PROCEDURE — 93000 ELECTROCARDIOGRAM COMPLETE: CPT | Performed by: NURSE PRACTITIONER

## 2022-10-21 RX ORDER — METRONIDAZOLE 10 MG/G
1 GEL TOPICAL AS NEEDED
COMMUNITY
Start: 2022-09-15

## 2022-10-21 NOTE — PROGRESS NOTES
Cardiac Electrophysiology Outpatient Note  [facility]    Office Visit     Clarence Melo  [id number]  10/21/2022    Primary Care Physician: Sabrina Ruth APRN    Referred By: No ref. provider found    CC:   Chief Complaint   Patient presents with   • Atrial Fibrillation     PROBLEM LIST:  1. Atrial flutter  Atrial fibrillation  a. Diagnosed 12/2021. Started on amiodarone.   b. CHADSVASC = 2, on Eliquis  c. Echo 1/27/22: EF 55-60%, grade III diastolic dysfunction, mild restrictive physiology, LA mild to moderately dilated RA mildly enlarged. RV upper limits of normal. Mild MR. Trivial AI. RVSP 20.  d. Monitor 12/20-1/2/2022: 2% Afib/flutter. (70)bpm.   e. Echo 3/2022: EF 40-45%. Mild aortic sclerosis with trace AI, moderate MR, mild thickening of mitral valve leaflets.   f. S/p successful ECV, 3/2022.   g. Recurrent Aflutter noted 5/2022  h. S/p successful ECV, 5/26/22.  i. Atrial fibrillation ablation, 6/27/2022, Dr. Gupta.  Successful PVI, ablation of CTI dependent atrial flutter  j. Atrial flutter recurrence 07/2022, Sotalol restarted  2. Systolic heart failure  a. Echo 1/27/22: EF 55-60%, grade III diastolic dysfunction,  b. Echo 3/2022: EF 40-45%.   3. Hyperlipidemia  4. Obstructive sleep apnea   a. Compliant with CPAP    History of Present Illness:   Clarence Melo is a 72 y.o. male who presents to the electrophysiology clinic for follow up of atrial fibrillation/ flutter. Since his ablation he reports he is feeling better overall. Unfortunately he did have recurrence after his ablation in the setting of pneumonia. He was started on sotalol and has had no recurrence this. He does continue to have fatigue and lacks the endurance he used to have. He is still able to walk his dogs and do his daily activities but reports he remains tired. He reports he is sleeping very poorly, melatonin did not help.    Past Surgical History:   Procedure Laterality Date   •  CARDIAC ABLATION  2022   • CARDIAC ELECTROPHYSIOLOGY PROCEDURE N/A 2022    Procedure: Ablation atrial fibrillation. Hold Eliquis night before procedure;  Surgeon: Benedicto Gupta MD;  Location: [facility] INVASIVE LOCATION;  Service: Cardiovascular;  Laterality: N/A;   • CARDIOVERSION  05/26/2022    x2   • COLONOSCOPY     • NASAL SEPTUM SURGERY     • SINUS SURGERY         Family History   Problem Relation Age of Onset   • Dementia Mother    • Heart disease Father    • Hyperlipidemia Father    • No Known Problems Sister    • No Known Problems Brother    • No Known Problems Maternal Grandmother    • No Known Problems Maternal Grandfather    • No Known Problems Paternal Grandmother    • No Known Problems Paternal Grandfather    • No Known Problems Maternal Aunt    • No Known Problems Maternal Uncle    • No Known Problems Paternal Aunt    • No Known Problems Paternal Uncle    • Alzheimer's disease Other    • Heart failure Other    • Anemia Neg Hx    • Arrhythmia Neg Hx    • Asthma Neg Hx    • Clotting disorder Neg Hx    • Fainting Neg Hx    • Heart attack Neg Hx    • Hypertension Neg Hx        Social History     Socioeconomic History   • Marital status: Significant Other   Tobacco Use   • Smoking status: Former     Packs/day: 0.25     Years: 8.00     Pack years: 2.00     Types: Cigarettes     Quit date:      Years since quittin.8   • Smokeless tobacco: Never   Vaping Use   • Vaping Use: Never used   Substance and Sexual Activity   • Alcohol use: Yes     Alcohol/week: 1.0 standard drink     Types: 1 Cans of beer per week     Comment: socially   • Drug use: No   • Sexual activity: Defer         Current Outpatient Medications:   •  acetaminophen (TYLENOL) 500 MG tablet, Take 1,000 mg by mouth Every 6 (Six) Hours As Needed for Mild Pain  or Headache., Disp: , Rfl:   •  apixaban (ELIQUIS) 5 MG tablet tablet, Take 1 tablet by mouth 2 (Two) Times a Day., Disp: 180 tablet, Rfl: 3  •  Artificial Tear Ointment  "(DRY EYES OP), Apply 1 dose to eye(s) as directed by provider As Needed., Disp: , Rfl:   •  CRESTOR 20 MG tablet, Take 20 mg by mouth Every Night., Disp: , Rfl:   •  fluticasone (FLONASE) 50 MCG/ACT nasal spray, 2 sprays into the nostril(s) as directed by provider As Needed for Rhinitis., Disp: , Rfl:   •  levothyroxine (SYNTHROID, LEVOTHROID) 25 MCG tablet, Take 25 mcg by mouth Every Morning., Disp: , Rfl:   •  loratadine (CLARITIN) 10 MG tablet, Take 10 mg by mouth As Needed for Allergies., Disp: , Rfl:   •  metroNIDAZOLE (METROGEL) 1 % gel, 1 application As Needed., Disp: , Rfl:   •  sotalol (BETAPACE AF) 80 MG tablet tablet, Take 1 tablet by mouth 2 (Two) Times a Day., Disp: 60 tablet, Rfl: 11  •  tadalafil (CIALIS) 5 MG tablet, Take 1 tablet by mouth Daily., Disp: , Rfl:     Allergies:   No Known Allergies    Review of Systems:  Review of Systems   Constitutional: Positive for malaise/fatigue.   Cardiovascular: Negative for chest pain, dyspnea on exertion, irregular heartbeat, leg swelling, near-syncope, palpitations and syncope.   Respiratory: Negative for shortness of breath.    Hematologic/Lymphatic: Negative for bleeding problem.   Neurological: Negative for dizziness and light-headedness.        Vital Signs: Blood pressure 102/70, pulse 66, height 180.3 cm (71\"), weight 94.9 kg (209 lb 3.2 oz), SpO2 95 %.    Physical Exam  Vitals reviewed.   Cardiovascular:      Rate and Rhythm: Normal rate and regular rhythm.      Heart sounds: Normal heart sounds.   Pulmonary:      Effort: Pulmonary effort is normal.      Breath sounds: Normal breath sounds.   Musculoskeletal:      Right lower leg: No edema.      Left lower leg: No edema.   Skin:     General: Skin is warm and dry.         Lab Results   Component Value Date    GLUCOSE 94 06/24/2022    CALCIUM 9.4 06/24/2022     06/24/2022    K 4.4 06/24/2022    CO2 28.0 06/24/2022     06/24/2022    BUN 16 06/24/2022    CREATININE 1.01 06/24/2022    BCR 15.8 " 06/24/2022    ANIONGAP 8.0 06/24/2022     Lab Results   Component Value Date    WBC 8.65 06/24/2022    HGB 16.4 06/24/2022    HCT 49.7 06/24/2022    MCV 91.7 06/24/2022     06/24/2022     Lab Results   Component Value Date    INR 1.23 (H) 06/24/2022    PROTIME 15.4 (H) 06/24/2022     Lab Results   Component Value Date    TSH 3.720 06/24/2022        Results for orders placed during the hospital encounter of 01/27/22    Adult Transthoracic Echo Complete W/ Cont if Necessary Per Protocol    Interpretation Summary  Good technical quality.    1.  LV size, function, wall motion, and wall thickness are normal.  Visually estimated ejection fraction is 55 to 60%.  Grade 3 diastolic dysfunction compatible with mild restrictive physiology.  The left atrium is mildly to moderately dilated.  The right atrium is mildly enlarged.  RV size is at the upper limits of normal RV systolic function is grossly preserved.  No septal defect or intracavitary mass or thrombus.    2.  The mitral valve is morphologically normal with no mitral stenosis and mild to at most mild to moderate mitral regurgitation.  The aortic valve is very minimally sclerotic, is tricuspid, but is not calcified nor stenotic.  There is trivial AI.  Right-sided heart valves are normal.    3.  There is a very thin clear space adjacent to the right atrium although there is no other evidence to suggest pericardial effusion.  The proximal great vessels are unremarkable.    4.  Pulmonary artery systolic pressures are estimated in the low 20s.      I personally viewed and interpreted the patient's EKG/Telemetry/lab data.      ECG 12 Lead    Date/Time: 10/21/2022 6:32 PM  Performed by: Winifred Hernandez APRN  Authorized by: Winifred Hernandez APRN   Comparison: compared with previous ECG from 9/13/2022  Similar to previous ECG  Rhythm: sinus rhythm  Rate: normal  BPM: 65  Comments:             Clarence Melo  reports that he quit smoking about 46 years ago. His  smoking use included cigarettes. He has a 2.00 pack-year smoking history. He has never used smokeless tobacco.    Assessment & Plan    1. Paroxysmal atrial fibrillation / flutter  - s/p ablation 06/27/2022 with recurrence in the setting of pneumonia. 48 hour monitor revealed 100% atrial flutter, started on sotalol 80 mg BID. No known recurrence since initiation. EKG NSR and stable today. Continue sotalol.  -Continue eliquis for stroke prevention. Denies bleeding problems.    2. CARLOS ALBERTO (obstructive sleep apnea)  - Continue compliance with CPAP    3. Chronic fatigue  - He does continue to report fatigue and no endurance. He reports he is sleeping very poorly, believes this is the cause. Tried melatonin, which did not help. He is planning to discuss other sleep aids with his PCP.   -If this continues, could recheck echo but he would like to hold off on this for now.     Follow Up:  Return for Next scheduled follow up.    Winifred Hernandez, APRN  10/21/2022

## 2023-01-18 NOTE — PROGRESS NOTES
David Pre Op test (send out)   Pts surgery has had to be moved per surgeon to 2-22-23 and pts Pre Op she had already on 1-9-23 will be further out from the 30 days. Does pt need a new pre op if so does she need 30 min can Dr Quiroz see her?

## 2023-01-20 ENCOUNTER — OFFICE VISIT (OUTPATIENT)
Dept: CARDIOLOGY | Facility: CLINIC | Age: 73
End: 2023-01-20
Payer: MEDICARE

## 2023-01-20 VITALS
HEIGHT: 71 IN | WEIGHT: 209 LBS | SYSTOLIC BLOOD PRESSURE: 130 MMHG | HEART RATE: 71 BPM | DIASTOLIC BLOOD PRESSURE: 80 MMHG | OXYGEN SATURATION: 95 % | BODY MASS INDEX: 29.26 KG/M2

## 2023-01-20 DIAGNOSIS — I10 ESSENTIAL HYPERTENSION: Primary | ICD-10-CM

## 2023-01-20 PROCEDURE — 99214 OFFICE O/P EST MOD 30 MIN: CPT | Performed by: STUDENT IN AN ORGANIZED HEALTH CARE EDUCATION/TRAINING PROGRAM

## 2023-01-20 PROCEDURE — 93000 ELECTROCARDIOGRAM COMPLETE: CPT | Performed by: STUDENT IN AN ORGANIZED HEALTH CARE EDUCATION/TRAINING PROGRAM

## 2023-01-20 NOTE — PROGRESS NOTES
Cardiac Electrophysiology Outpatient Note  [facility] at [facility]    Office Visit     Clarence Melo  [id number]  10/21/2022    Primary Care Physician: Sabrina Ruth APRN    Referred By: No ref. provider found    CC:   Chief Complaint   Patient presents with   • Paroxysmal atrial fibrillation      PROBLEM LIST:  1. Atrial flutter  Atrial fibrillation  a. Diagnosed 12/2021. Started on amiodarone.   b. CHADSVASC = 2, on Eliquis  c. Echo 1/27/22: EF 55-60%, grade III diastolic dysfunction, mild restrictive physiology, LA mild to moderately dilated RA mildly enlarged. RV upper limits of normal. Mild MR. Trivial AI. RVSP 20.  d. Monitor 12/20-1/2/2022: 2% Afib/flutter. (70)bpm.   e. Echo 3/2022: EF 40-45%. Mild aortic sclerosis with trace AI, moderate MR, mild thickening of mitral valve leaflets.   f. S/p successful ECV, 3/2022.   g. Recurrent Aflutter noted 5/2022  h. S/p successful ECV, 5/26/22.  i. Atrial fibrillation ablation, 6/27/2022, Dr. Gupta.  Successful PVI, ablation of CTI dependent atrial flutter  j. Atrial flutter recurrence 07/2022, Sotalol restarted  2. Systolic heart failure  a. Echo 1/27/22: EF 55-60%, grade III diastolic dysfunction,  b. Echo 3/2022: EF 40-45%.   3. Hyperlipidemia  4. Obstructive sleep apnea   a. Compliant with CPAP    History of Present Illness:   Clarence Melo is a 72 y.o. male who presents to the electrophysiology clinic for follow up of atrial fibrillation/ flutter. Since his ablation he reports he is feeling better overall. Unfortunately he did have recurrence after his ablation in the setting of pneumonia. He was started on sotalol and has had no recurrence this. He does continue to have fatigue and lacks the endurance he used to have. He is still able to walk his dogs and do his daily activities but reports he remains tired. He reports he is sleeping very poorly, he has anxiety and cannot stay asleep for very long.  He has been using  his CPAP.  Past Surgical History:   Procedure Laterality Date   • CARDIAC ABLATION  2022   • CARDIAC ELECTROPHYSIOLOGY PROCEDURE N/A 2022    Procedure: Ablation atrial fibrillation. Hold Eliquis night before procedure;  Surgeon: Benedicto Gupta MD;  Location: [facility] INVASIVE LOCATION;  Service: Cardiovascular;  Laterality: N/A;   • CARDIOVERSION  05/26/2022    x2   • COLONOSCOPY     • NASAL SEPTUM SURGERY     • SINUS SURGERY         Family History   Problem Relation Age of Onset   • Dementia Mother    • Heart disease Father    • Hyperlipidemia Father    • No Known Problems Sister    • No Known Problems Brother    • No Known Problems Maternal Grandmother    • No Known Problems Maternal Grandfather    • No Known Problems Paternal Grandmother    • No Known Problems Paternal Grandfather    • No Known Problems Maternal Aunt    • No Known Problems Maternal Uncle    • No Known Problems Paternal Aunt    • No Known Problems Paternal Uncle    • Alzheimer's disease Other    • Heart failure Other    • Anemia Neg Hx    • Arrhythmia Neg Hx    • Asthma Neg Hx    • Clotting disorder Neg Hx    • Fainting Neg Hx    • Heart attack Neg Hx    • Hypertension Neg Hx        Social History     Socioeconomic History   • Marital status: Significant Other   Tobacco Use   • Smoking status: Former     Packs/day: 0.25     Years: 8.00     Pack years: 2.00     Types: Cigarettes     Quit date:      Years since quittin.0   • Smokeless tobacco: Never   Vaping Use   • Vaping Use: Never used   Substance and Sexual Activity   • Alcohol use: Yes     Alcohol/week: 1.0 standard drink     Types: 1 Cans of beer per week     Comment: socially   • Drug use: No   • Sexual activity: Defer         Current Outpatient Medications:   •  acetaminophen (TYLENOL) 500 MG tablet, Take 1,000 mg by mouth Every 6 (Six) Hours As Needed for Mild Pain  or Headache., Disp: , Rfl:   •  apixaban (ELIQUIS) 5 MG tablet tablet, Take 1 tablet by mouth 2 (Two) Times  "a Day., Disp: 180 tablet, Rfl: 3  •  Artificial Tear Ointment (DRY EYES OP), Apply 1 dose to eye(s) as directed by provider As Needed., Disp: , Rfl:   •  CRESTOR 20 MG tablet, Take 20 mg by mouth Every Night., Disp: , Rfl:   •  fluticasone (FLONASE) 50 MCG/ACT nasal spray, 2 sprays into the nostril(s) as directed by provider As Needed for Rhinitis., Disp: , Rfl:   •  levothyroxine (SYNTHROID, LEVOTHROID) 25 MCG tablet, Take 25 mcg by mouth Every Morning., Disp: , Rfl:   •  loratadine (CLARITIN) 10 MG tablet, Take 10 mg by mouth As Needed for Allergies., Disp: , Rfl:   •  metroNIDAZOLE (METROGEL) 1 % gel, 1 application As Needed., Disp: , Rfl:   •  sotalol (BETAPACE AF) 80 MG tablet tablet, Take 1 tablet by mouth 2 (Two) Times a Day., Disp: 60 tablet, Rfl: 11  •  tadalafil (CIALIS) 5 MG tablet, Take 1 tablet by mouth Daily., Disp: , Rfl:     Allergies:   No Known Allergies        Vital Signs: Blood pressure 130/80, pulse 71, height 180.3 cm (71\"), weight 94.8 kg (209 lb), SpO2 95 %.    Physical Exam  Vitals reviewed.   Cardiovascular:      Rate and Rhythm: Normal rate and regular rhythm.      Heart sounds: Normal heart sounds.   Pulmonary:      Effort: Pulmonary effort is normal.      Breath sounds: Normal breath sounds.   Musculoskeletal:      Right lower leg: No edema.      Left lower leg: No edema.   Skin:     General: Skin is warm and dry.         Lab Results   Component Value Date    GLUCOSE 94 06/24/2022    CALCIUM 9.4 06/24/2022     06/24/2022    K 4.4 06/24/2022    CO2 28.0 06/24/2022     06/24/2022    BUN 16 06/24/2022    CREATININE 1.01 06/24/2022    BCR 15.8 06/24/2022    ANIONGAP 8.0 06/24/2022     Lab Results   Component Value Date    WBC 8.65 06/24/2022    HGB 16.4 06/24/2022    HCT 49.7 06/24/2022    MCV 91.7 06/24/2022     06/24/2022     Lab Results   Component Value Date    INR 1.23 (H) 06/24/2022    PROTIME 15.4 (H) 06/24/2022     Lab Results   Component Value Date    TSH 3.720 " 06/24/2022        Results for orders placed during the hospital encounter of 01/27/22    Adult Transthoracic Echo Complete W/ Cont if Necessary Per Protocol    Interpretation Summary  Good technical quality.    1.  LV size, function, wall motion, and wall thickness are normal.  Visually estimated ejection fraction is 55 to 60%.  Grade 3 diastolic dysfunction compatible with mild restrictive physiology.  The left atrium is mildly to moderately dilated.  The right atrium is mildly enlarged.  RV size is at the upper limits of normal RV systolic function is grossly preserved.  No septal defect or intracavitary mass or thrombus.    2.  The mitral valve is morphologically normal with no mitral stenosis and mild to at most mild to moderate mitral regurgitation.  The aortic valve is very minimally sclerotic, is tricuspid, but is not calcified nor stenotic.  There is trivial AI.  Right-sided heart valves are normal.    3.  There is a very thin clear space adjacent to the right atrium although there is no other evidence to suggest pericardial effusion.  The proximal great vessels are unremarkable.    4.  Pulmonary artery systolic pressures are estimated in the low 20s.      I personally viewed and interpreted the patient's EKG/Telemetry/lab data.      ECG 12 Lead    Date/Time: 1/20/2023 11:53 AM  Performed by: Benedicto Gupta MD  Authorized by: Benedicto Gupta MD   Comparison: compared with previous ECG from 10/21/2022  Similar to previous ECG  Comments: Normal sinus rhythm without abnormalities          Advance Care Planning   ACP discussion was held with the patient during this visit. Patient does not have an advance directive, information provided.         Clarence Melo  reports that he quit smoking about 47 years ago. His smoking use included cigarettes. He has a 2.00 pack-year smoking history. He has never used smokeless tobacco.    Assessment & Plan    1. Paroxysmal atrial fibrillation / flutter  - s/p ablation  06/27/2022 consisting appointment isolation and CTI flutter ablation.  He did have a recurrence of a flutterin the setting of pneumonia. 48 hour monitor revealed 100% atrial flutter, started on sotalol 80 mg BID. No known recurrence since initiation. EKG NSR and stable today.   -We discussed the options of stopping sotalol versus continuing it.  He does feel some fatigue which may be worsened by the sotalol.  Therefore we will trial stopping.  Discussed that should he have recurrence of atrial flutter we may need to consider different antiarrhythmic such as Tikosyn versus a repeat ablation.  -Continue eliquis for stroke prevention. Denies bleeding problems.    2. CARLOS ALBERTO (obstructive sleep apnea)  - Continue compliance with CPAP    3. Chronic fatigue  - He does continue to report fatigue and no endurance. He reports he is sleeping very poorly, believes this is the cause.  We will see if stopping sotalol improves this.     Follow Up:  No follow-ups on file.    Benedicto Gupta MD  10/21/2022

## 2023-02-23 ENCOUNTER — OFFICE VISIT (OUTPATIENT)
Dept: UROLOGY | Facility: CLINIC | Age: 73
End: 2023-02-23
Payer: MEDICARE

## 2023-02-23 VITALS
BODY MASS INDEX: 29.26 KG/M2 | WEIGHT: 209 LBS | HEART RATE: 92 BPM | SYSTOLIC BLOOD PRESSURE: 138 MMHG | DIASTOLIC BLOOD PRESSURE: 77 MMHG | HEIGHT: 71 IN

## 2023-02-23 DIAGNOSIS — R97.20 ELEVATED PROSTATE SPECIFIC ANTIGEN (PSA): Primary | ICD-10-CM

## 2023-02-23 PROCEDURE — 99203 OFFICE O/P NEW LOW 30 MIN: CPT

## 2023-02-23 PROCEDURE — 36415 COLL VENOUS BLD VENIPUNCTURE: CPT

## 2023-02-23 PROCEDURE — 84153 ASSAY OF PSA TOTAL: CPT

## 2023-02-23 PROCEDURE — 84154 ASSAY OF PSA FREE: CPT

## 2023-02-23 RX ORDER — MIRTAZAPINE 15 MG/1
15 TABLET, FILM COATED ORAL NIGHTLY
COMMUNITY

## 2023-02-23 NOTE — PROGRESS NOTES
"Chief Complaint:    Chief Complaint   Patient presents with   • Elevated PSA       Vital Signs:   /77 (BP Location: Right arm, Patient Position: Sitting)   Pulse 92   Ht 180.3 cm (71\")   Wt 94.8 kg (209 lb)   BMI 29.15 kg/m²   Body mass index is 29.15 kg/m².      HPI:  Clarence Melo is a 72 y.o. male who presents today for initial evaluation     History of Present Illness  Mr. Melo presents to the clinic for initial evaluation of elevated PSA.  He had a PSA taken by his primary care provider on 2023 that came back elevated around 5.25.  He denies any history of elevated PSA in the past.  Denies any known family history of prostate cancer.  He does state that his father  of cancer but is unsure what it was.  He has no lower urinary tract symptoms at this time.  He denies frequency, urgency, intermittency, weak stream, straining with urination, or sensation of incomplete bladder emptying.  He does get up roughly 1 time throughout the night.  Prostate on digital rectal exam today is slightly enlarged however there is no bogginess, tenderness, or nodules noted.  This time I am recommending a repeat PSA free and total.  I will also send the patient's urine off for prostate cancer evaluation.  Follow-up in 1 month to discuss results.      Past Medical History:  Past Medical History:   Diagnosis Date   • Atrial fibrillation (HCC)    • COVID 2022    no hospitalization; mild symptoms   • Dry eyes    • Environmental allergies    • Hyperlipidemia    • Hypertension    • hypothyroidism    • Pre-diabetes     diet and exercise controlled   • Sleep apnea     has a c-pap       Current Meds:  Current Outpatient Medications   Medication Sig Dispense Refill   • acetaminophen (TYLENOL) 500 MG tablet Take 1,000 mg by mouth Every 6 (Six) Hours As Needed for Mild Pain  or Headache.     • apixaban (ELIQUIS) 5 MG tablet tablet Take 1 tablet by mouth 2 (Two) Times a Day. 180 tablet 3   • Artificial Tear Ointment (DRY " EYES OP) Apply 1 dose to eye(s) as directed by provider As Needed.     • CRESTOR 20 MG tablet Take 20 mg by mouth Every Night.     • fluticasone (FLONASE) 50 MCG/ACT nasal spray 2 sprays into the nostril(s) as directed by provider As Needed for Rhinitis.     • levothyroxine (SYNTHROID, LEVOTHROID) 25 MCG tablet Take 25 mcg by mouth Every Morning.     • loratadine (CLARITIN) 10 MG tablet Take 10 mg by mouth As Needed for Allergies.     • metroNIDAZOLE (METROGEL) 1 % gel 1 application As Needed.     • mirtazapine (REMERON) 15 MG tablet Take 15 mg by mouth Every Night.     • sotalol (BETAPACE AF) 80 MG tablet tablet Take 1 tablet by mouth 2 (Two) Times a Day. (Patient taking differently: Take 40 mg by mouth Daily.) 60 tablet 11   • tadalafil (CIALIS) 5 MG tablet Take 1 tablet by mouth Daily.       No current facility-administered medications for this visit.        Allergies:   No Known Allergies     Past Surgical History:  Past Surgical History:   Procedure Laterality Date   • CARDIAC ABLATION  2022   • CARDIAC ELECTROPHYSIOLOGY PROCEDURE N/A 2022    Procedure: Ablation atrial fibrillation. Hold Eliquis night before procedure;  Surgeon: Benedicto Gupta MD;  Location: [facility] INVASIVE LOC[facility];  Service: Cardiovascular;  Laterality: N/A;   • CARDIOVERSION  05/26/2022    x2   • COLONOSCOPY     • NASAL SEPTUM SURGERY     • SINUS SURGERY         Social History:  Social History     Socioeconomic History   • Marital status: Significant Other   Tobacco Use   • Smoking status: Former     Packs/day: 0.25     Years: 8.00     Pack years: 2.00     Types: Cigarettes     Quit date:      Years since quittin.1   • Smokeless tobacco: Never   Vaping Use   • Vaping Use: Never used   Substance and Sexual Activity   • Alcohol use: Yes     Alcohol/week: 1.0 standard drink     Types: 1 Cans of beer per week     Comment: socially   • Drug use: No   • Sexual activity: Defer       Family History:  Family History   Problem  Relation Age of Onset   • Dementia Mother    • Heart disease Father    • Hyperlipidemia Father    • No Known Problems Sister    • No Known Problems Brother    • No Known Problems Maternal Grandmother    • No Known Problems Maternal Grandfather    • No Known Problems Paternal Grandmother    • No Known Problems Paternal Grandfather    • No Known Problems Maternal Aunt    • No Known Problems Maternal Uncle    • No Known Problems Paternal Aunt    • No Known Problems Paternal Uncle    • Alzheimer's disease Other    • Heart failure Other    • Anemia Neg Hx    • Arrhythmia Neg Hx    • Asthma Neg Hx    • Clotting disorder Neg Hx    • Fainting Neg Hx    • Heart attack Neg Hx    • Hypertension Neg Hx        Review of Systems:  Review of Systems   Constitutional: Negative for chills, fatigue, fever and unexpected weight change.   Respiratory: Positive for shortness of breath. Negative for cough, chest tightness and wheezing.    Cardiovascular: Negative for chest pain and leg swelling.   Gastrointestinal: Negative for abdominal pain, constipation, diarrhea, nausea and vomiting.   Genitourinary: Negative for difficulty urinating, dysuria, frequency, penile pain and urgency.   Musculoskeletal: Negative for back pain and joint swelling.   Skin: Negative for rash.   Neurological: Negative for dizziness and headaches.   Psychiatric/Behavioral: Negative for confusion and suicidal ideas.       Physical Exam:  Physical Exam  Constitutional:       General: He is not in acute distress.     Appearance: Normal appearance.   HENT:      Head: Normocephalic and atraumatic.      Nose: Nose normal.      Mouth/Throat:      Mouth: Mucous membranes are moist.   Eyes:      Conjunctiva/sclera: Conjunctivae normal.   Cardiovascular:      Rate and Rhythm: Normal rate and regular rhythm.      Pulses: Normal pulses.      Heart sounds: Normal heart sounds.   Pulmonary:      Effort: Pulmonary effort is normal.      Breath sounds: Normal breath sounds.    Abdominal:      General: Bowel sounds are normal.      Palpations: Abdomen is soft.   Genitourinary:     Prostate: Normal.      Comments: Enlarged prostate  Musculoskeletal:         General: Normal range of motion.      Cervical back: Normal range of motion.   Skin:     General: Skin is warm.   Neurological:      General: No focal deficit present.      Mental Status: He is alert and oriented to person, place, and time.   Psychiatric:         Mood and Affect: Mood normal.         Behavior: Behavior normal.         Thought Content: Thought content normal.         Judgment: Judgment normal.         IPSS Questionnaire (AUA-7):  IPSS Questionnaire (AUA-7):                  IPSS Questionnaire (AUA-7):  Over the past month…    1)  Incomplete Emptying  How often have you had a sensation of not emptying your bladder?  0 - Not at all   2)  Frequency  How often have you had to urinate less than every two hours? 0 - Not at all   3)  Intermittency  How often have you found you stopped and started again several times when you urinated?  0 - Not at all   4) Urgency  How often have you found it difficult to postpone urination?  0 - Not at all   5) Weak Stream  How often have you had a weak urinary stream?  0 - Not at all   6) Straining  How often have you had to push or strain to begin urination?  0 - Not at all   7) Nocturia  How many times did you typically get up at night to urinate?  1 - 1 time   Total Score:  1   The International Prostate Symptom Score (IPSS) is used to screen, diagnose, track symptoms of benign prostatic hyperplasia (BPH).    0-7 pts (Mild Symptoms)  / 8-19 pts (Moderate) / 20-35 (Severe)    Quality of life due to urinary symptoms:  If you were to spend the rest of your life with your urinary condition the way it is now, how would you feel about that? 1-Pleased   Urine Leakage (Incontinence) 0-No Leakage       Recent Image (CT and/or KUB):   CT Abdomen and Pelvis: No results found for this or any previous  visit.     CT Stone Protocol: No results found for this or any previous visit.     KUB: No results found for this or any previous visit.       Labs:  Brief Urine Lab Results     None        No visits with results within 3 Month(s) from this visit.   Latest known visit with results is:   Hospital Outpatient Visit on 07/21/2022   Component Date Value Ref Range Status   • QT Interval 07/21/2022 358  ms Final   • QTC Interval 07/21/2022 540  ms Final        Procedure: None  Procedures     I have reviewed and agree with the above PMH, PSH, FMH, social history, medications, allergies, and labs.     Assessment/Plan:   Problem List Items Addressed This Visit        Genitourinary and Reproductive     Elevated prostate specific antigen (PSA) - Primary    Relevant Orders    PSA Total+% Free (Serial)       Health Maintenance:   Health Maintenance Due   Topic Date Due   • COLORECTAL CANCER SCREENING  Never done   • TDAP/TD VACCINES (1 - Tdap) Never done   • ZOSTER VACCINE (1 of 2) Never done   • Pneumococcal Vaccine 65+ (1 - PCV) Never done   • HEPATITIS C SCREENING  Never done   • ANNUAL WELLNESS VISIT  Never done   • LIPID PANEL  Never done   • COVID-19 Vaccine (4 - Booster for Pfizer series) 11/22/2021   • INFLUENZA VACCINE  08/01/2022        Smoking Counseling: Former smoker.  Never used smokeless tobacco.    Urine Incontinence: Patient reports that he is not currently experiencing any symptoms of urinary incontinence.    Patient was given instructions and counseling regarding his condition or for health maintenance advice. Please see specific information pulled into the AVS if appropriate.    Patient Education:   Elevated PSA -patient had recent PSA completed by his primary care provider that came back high at 5.25.  I Discussed the pathophysiology of BPH and obstruction.  We discussed the static and dynamic effects of BPH as well as using 5 alpha reductase inhibitors versus alpha blockade.  We discussed the indications for  transurethral surgery as well and/ or other therapeutic options available including all of the newer techniques.  Currently has no lower urinary tract symptoms at this time.  PSA testing - I am recommending a repeat PSA free and total. I discussed the pathophysiology of PSA testing indicating its use in the diagnosis and management of prostate cancer.  I discussed the normal range being 0 to 4, but more appropriately being much closer to 0 to 2 in a normal male.  I discussed the fact that after a certain age it is against recommendation to use PSA testing especially in view of numerous comorbidities.  I discussed many of the things that can artificially raise PSA including put not limited to a recent infection, urinary tract infection, recent sexual intercourse, or even the type of movement such as manipulation of the prostate from riding a bicycle.  It was discussed that the most important use of PSA is the velocity measurement.  This refers to the change of PSA with time. I discussed that we look for greater than 20% rise over a year to help us make the prediction of prostate cancer.  I also discussed that in the case of prostate cancer indicating a radical prostatectomy, the PSA should be 0 and any rise indicates an early biochemical recurrence.  I will also send the patient's urine off for prostate cancer screening.  This will give us a percent chance for positive prostate biopsy.  I would like to follow-up with the patient in 1 month for discussion of test results.  Advised him return to clinic sooner if needed.  Patient verbalized understanding agrees to plan of care.    Visit Diagnoses:    ICD-10-CM ICD-9-CM   1. Elevated prostate specific antigen (PSA)  R97.20 790.93       Meds Ordered During Visit:  No orders of the defined types were placed in this encounter.      Follow Up Appointment: 1 month  No follow-ups on file.      This document has been electronically signed by Brayan Reyes PA-C   February 23,  2023 10:12 EST    Part of this note may be an electronic transcription/translation of spoken language to printed text using the Dragon Dictation System.

## 2023-02-24 LAB
PSA FREE MFR SERPL: 26.3 %
PSA FREE SERPL-MCNC: 1.05 NG/ML
PSA SERPL-MCNC: 4 NG/ML (ref 0–4)

## 2023-03-23 ENCOUNTER — OFFICE VISIT (OUTPATIENT)
Dept: CARDIOLOGY | Facility: CLINIC | Age: 73
End: 2023-03-23
Payer: MEDICARE

## 2023-03-23 VITALS
OXYGEN SATURATION: 97 % | SYSTOLIC BLOOD PRESSURE: 119 MMHG | DIASTOLIC BLOOD PRESSURE: 76 MMHG | HEART RATE: 89 BPM | WEIGHT: 210.2 LBS | BODY MASS INDEX: 29.43 KG/M2 | HEIGHT: 71 IN

## 2023-03-23 DIAGNOSIS — I48.92 PAROXYSMAL ATRIAL FLUTTER: Primary | ICD-10-CM

## 2023-03-23 DIAGNOSIS — I34.0 MITRAL VALVE INSUFFICIENCY, UNSPECIFIED ETIOLOGY: ICD-10-CM

## 2023-03-23 DIAGNOSIS — R53.82 CHRONIC FATIGUE: ICD-10-CM

## 2023-03-23 PROCEDURE — 99214 OFFICE O/P EST MOD 30 MIN: CPT | Performed by: PHYSICIAN ASSISTANT

## 2023-03-23 PROCEDURE — 3078F DIAST BP <80 MM HG: CPT | Performed by: PHYSICIAN ASSISTANT

## 2023-03-23 PROCEDURE — 3074F SYST BP LT 130 MM HG: CPT | Performed by: PHYSICIAN ASSISTANT

## 2023-03-23 RX ORDER — FLECAINIDE ACETATE 50 MG/1
50 TABLET ORAL 2 TIMES DAILY
Qty: 60 TABLET | Refills: 11 | Status: SHIPPED | OUTPATIENT
Start: 2023-03-23

## 2023-03-23 RX ORDER — SOTALOL HYDROCHLORIDE 80 MG/1
1 TABLET ORAL EVERY 12 HOURS SCHEDULED
COMMUNITY
Start: 2022-12-24 | End: 2023-03-23

## 2023-03-23 NOTE — PROGRESS NOTES
Problem list     Subjective   Clarence Melo is a 72 y.o. male     Chief Complaint   Patient presents with   • Follow-up     6 MTH F/U    Problem list:     1. Low risk stress test 2016  1.1 low risk stress test January 2018  2. Preserved systolic function  3. Dyslipidemia  4. Obstructive sleep apnea being treated with CPAP  5.  Atrial fibrillation/flutter  5.1 diagnosed during ER visit and hospitalization at [facility] December 2021  5.2 recent electrocardioversion performed at [facility] in [place]  5.3 pulmonary vein ablation of atrial flutter June 2022  5.4 patient currently on sotalol therapy  6.  Mitral regurgitation  6.1 moderate mitral regurgitation by echocardiogram March 2022    HPI    Patient is a 72-year-old male who presents back to the office to be evaluated.  Patient has the above past medical history and follows with EP services as well.    He has done well without any chest pain or pressure but does complain of fatigue.  He has a lack of energy and describes some of it possibly being related to age.  However, he is concerned.  He recently has decreased his sotalol to taking 1 pill a day.  He does not describe PND orthopnea.  He does have mild exertional dyspnea.    He does not palpitate nor does he complain of dysrhythmic symptoms.  Otherwise, he is stable.      Current Outpatient Medications on File Prior to Visit   Medication Sig Dispense Refill   • acetaminophen (TYLENOL) 500 MG tablet Take 2 tablets by mouth Every 6 (Six) Hours As Needed for Mild Pain or Headache.     • apixaban (ELIQUIS) 5 MG tablet tablet Take 1 tablet by mouth 2 (Two) Times a Day. 180 tablet 3   • Artificial Tear Ointment (DRY EYES OP) Apply 1 dose to eye(s) as directed by provider As Needed.     • CRESTOR 20 MG tablet Take 1 tablet by mouth Every Night.     • fluticasone (FLONASE) 50 MCG/ACT nasal spray 2 sprays into the nostril(s) as directed by provider As Needed for Rhinitis.     •  levothyroxine (SYNTHROID, LEVOTHROID) 25 MCG tablet Take 1 tablet by mouth Every Morning.     • loratadine (CLARITIN) 10 MG tablet Take 1 tablet by mouth As Needed for Allergies.     • metroNIDAZOLE (METROGEL) 1 % gel 1 application As Needed.     • mirtazapine (REMERON) 15 MG tablet Take 1 tablet by mouth Every Night.     • tadalafil (CIALIS) 5 MG tablet Take 1 tablet by mouth Daily.     • [DISCONTINUED] sotalol (BETAPACE AF) 80 MG tablet tablet Take 1 tablet by mouth 2 (Two) Times a Day. (Patient taking differently: Take 40 mg by mouth Daily.) 60 tablet 11   • [DISCONTINUED] sotalol (BETAPACE) 80 MG tablet Take 1 tablet by mouth Every 12 (Twelve) Hours. (Patient not taking: Reported on 3/23/2023)       No current facility-administered medications on file prior to visit.       Patient has no known allergies.    Past Medical History:   Diagnosis Date   • Atrial fibrillation (HCC)    • COVID 2022    no hospitalization; mild symptoms   • Dry eyes    • Environmental allergies    • Hyperlipidemia    • Hypertension    • hypothyroidism    • Pre-diabetes     diet and exercise controlled   • Sleep apnea     has a c-pap       Social History     Socioeconomic History   • Marital status: Significant Other   Tobacco Use   • Smoking status: Former     Packs/day: 0.25     Years: 8.00     Pack years: 2.00     Types: Cigarettes     Quit date:      Years since quittin.2   • Smokeless tobacco: Never   Vaping Use   • Vaping Use: Never used   Substance and Sexual Activity   • Alcohol use: Yes     Alcohol/week: 1.0 standard drink     Types: 1 Cans of beer per week     Comment: socially   • Drug use: No   • Sexual activity: Defer       Family History   Problem Relation Age of Onset   • Dementia Mother    • Heart disease Father    • Hyperlipidemia Father    • No Known Problems Sister    • No Known Problems Brother    • No Known Problems Maternal Grandmother    • No Known Problems Maternal Grandfather    • No Known Problems  "Paternal Grandmother    • No Known Problems Paternal Grandfather    • No Known Problems Maternal Aunt    • No Known Problems Maternal Uncle    • No Known Problems Paternal Aunt    • No Known Problems Paternal Uncle    • Alzheimer's disease Other    • Heart failure Other    • Anemia Neg Hx    • Arrhythmia Neg Hx    • Asthma Neg Hx    • Clotting disorder Neg Hx    • Fainting Neg Hx    • Heart attack Neg Hx    • Hypertension Neg Hx        Review of Systems   Constitutional: Positive for fatigue. Negative for appetite change, chills and fever.   HENT: Positive for sinus pressure and sinus pain. Negative for dental problem, drooling, ear discharge, ear pain, facial swelling, rhinorrhea, sneezing and sore throat.    Eyes: Negative for pain, redness, itching and visual disturbance.   Respiratory: Positive for cough and shortness of breath. Negative for choking and wheezing.    Cardiovascular: Negative for chest pain, palpitations and leg swelling.   Gastrointestinal: Negative for blood in stool, constipation and diarrhea.   Genitourinary: Negative for difficulty urinating.   Musculoskeletal: Negative for arthralgias, back pain and neck pain.   Skin: Negative for rash and wound.   Neurological: Negative for dizziness, weakness and numbness.   Psychiatric/Behavioral: Positive for sleep disturbance.       Objective   Vitals:    03/23/23 0958   BP: 119/76   Pulse: 89   SpO2: 97%   Weight: 95.3 kg (210 lb 3.2 oz)   Height: 180.3 cm (70.98\")      /76   Pulse 89   Ht 180.3 cm (70.98\")   Wt 95.3 kg (210 lb 3.2 oz)   SpO2 97%   BMI 29.33 kg/m²     Lab Results (most recent)     None          Physical Exam  Vitals and nursing note reviewed.   Constitutional:       General: He is not in acute distress.     Appearance: Normal appearance. He is well-developed.   HENT:      Head: Normocephalic and atraumatic.   Eyes:      General: No scleral icterus.        Right eye: No discharge.         Left eye: No discharge.      " Conjunctiva/sclera: Conjunctivae normal.   Neck:      Vascular: No carotid bruit.   Cardiovascular:      Rate and Rhythm: Normal rate and regular rhythm.      Heart sounds: Normal heart sounds. No murmur heard.    No friction rub. No gallop.   Pulmonary:      Effort: Pulmonary effort is normal. No respiratory distress.      Breath sounds: Normal breath sounds. No wheezing or rales.   Chest:      Chest wall: No tenderness.   Musculoskeletal:      Right lower leg: No edema.      Left lower leg: No edema.   Skin:     General: Skin is warm and dry.      Coloration: Skin is not pale.      Findings: No erythema or rash.   Neurological:      Mental Status: He is alert and oriented to person, place, and time.      Cranial Nerves: No cranial nerve deficit.   Psychiatric:         Behavior: Behavior normal.         Procedure   Procedures       Assessment & Plan     Problems Addressed this Visit        Cardiac and Vasculature    Mitral valve insufficiency    Paroxysmal atrial flutter (HCC) - Primary       Symptoms and Signs    Chronic fatigue   Diagnoses       Codes Comments    Paroxysmal atrial flutter (HCC)    -  Primary ICD-10-CM: I48.92  ICD-9-CM: 427.32     Mitral valve insufficiency, unspecified etiology     ICD-10-CM: I34.0  ICD-9-CM: 424.0     Chronic fatigue     ICD-10-CM: R53.82  ICD-9-CM: 780.79         Recommendation  1.  Patient is a 72-year-old male who presents to the office to be evaluated.  His main complaint today appears to be fatigue.  He is concerned about some of his medications potentially doing it.  He is down to 1 pill of sotalol daily.  I am stopping that as he has already decreased it and likely is not getting full benefit from just taking once a day anyway.  Saturday I want him to start flecainide and return Monday for an EKG.  He will continue to follow with EP services.  Hopefully, this will offset some of potential side effects from the beta-blocker part of the sotalol but I am not convinced that  that medication is causing the level of fatigue he is experiencing.    2.  In regards to his mitral valve disease, we will continue to monitor.  Echocardiogram suggest mild to moderate regurgitation.  We can continue to monitor.    3.  I did discuss with his dyspnea and symptoms otherwise, we could consider a stress test to rule out ischemia.  He does not seem interested in this point as he feels his symptoms are not to that level at this time.    4.  Otherwise, we will continue current medical treatment.  He will have a nurse visit on Monday for an EKG.  He will follow with our office and EP as scheduled.             Clarence Melo  reports that he quit smoking about 47 years ago. His smoking use included cigarettes. He has a 2.00 pack-year smoking history. He has never used smokeless tobacco..           Advance Care Planning   ACP discussion was declined by the patient. Patient does not have an advance directive, declines further assistance.        Electronically signed by:

## 2023-03-27 ENCOUNTER — CLINICAL SUPPORT (OUTPATIENT)
Dept: CARDIOLOGY | Facility: CLINIC | Age: 73
End: 2023-03-27
Payer: MEDICARE

## 2023-03-27 VITALS
SYSTOLIC BLOOD PRESSURE: 132 MMHG | DIASTOLIC BLOOD PRESSURE: 86 MMHG | BODY MASS INDEX: 29.71 KG/M2 | WEIGHT: 212.2 LBS | HEIGHT: 71 IN | OXYGEN SATURATION: 96 % | HEART RATE: 98 BPM

## 2023-03-27 DIAGNOSIS — I48.92 ATRIAL FLUTTER, PAROXYSMAL: Primary | ICD-10-CM

## 2023-03-27 PROCEDURE — 93000 ELECTROCARDIOGRAM COMPLETE: CPT | Performed by: PHYSICIAN ASSISTANT

## 2023-03-27 NOTE — PROGRESS NOTES
Clarence Melo  1950  3/27/2023   ?   Chief Complaint   Patient presents with   • Atrial Flutter     Here for NV EKG. Started Flec. Sat. am      ?   HPI:   ? Mr. Melo is here for NV EKG, known history Parox. A-Flutter. Started Flecainide 50 mg po bid on Sat. Am. He has had x 5 doses so far and is tolerating med well. States he feels great. Confirms taking eliquis and denies any bleeding. EKG done and to be reviewed by Leandro Luz PAC. PH,LPN  ?   ?     Current Outpatient Medications:   •  acetaminophen (TYLENOL) 500 MG tablet, Take 2 tablets by mouth Every 6 (Six) Hours As Needed for Mild Pain or Headache., Disp: , Rfl:   •  apixaban (ELIQUIS) 5 MG tablet tablet, Take 1 tablet by mouth 2 (Two) Times a Day., Disp: 180 tablet, Rfl: 3  •  Artificial Tear Ointment (DRY EYES OP), Apply 1 dose to eye(s) as directed by provider As Needed., Disp: , Rfl:   •  CRESTOR 20 MG tablet, Take 1 tablet by mouth Every Night., Disp: , Rfl:   •  flecainide (TAMBOCOR) 50 MG tablet, Take 1 tablet by mouth 2 (Two) Times a Day., Disp: 60 tablet, Rfl: 11  •  fluticasone (FLONASE) 50 MCG/ACT nasal spray, 2 sprays into the nostril(s) as directed by provider As Needed for Rhinitis., Disp: , Rfl:   •  levothyroxine (SYNTHROID, LEVOTHROID) 25 MCG tablet, Take 1 tablet by mouth Every Morning., Disp: , Rfl:   •  loratadine (CLARITIN) 10 MG tablet, Take 1 tablet by mouth As Needed for Allergies., Disp: , Rfl:   •  metroNIDAZOLE (METROGEL) 1 % gel, 1 application As Needed., Disp: , Rfl:   •  mirtazapine (REMERON) 15 MG tablet, Take 1 tablet by mouth Every Night., Disp: , Rfl:   •  tadalafil (CIALIS) 5 MG tablet, Take 1 tablet by mouth Daily., Disp: , Rfl:    ?   ?   Patient has no known allergies.         ECG 12 Lead    Date/Time: 3/27/2023 11:13 AM  Performed by: Leandro Luz PA  Authorized by: Leandro Luz PA   Comparison: compared with previous ECG from 1/27/2023  Comments: Sinus rhythm at 93 bpm with no acute ST  changes             ?   Assessment & Plan      1.Parox. A-Flutter. On Flec. And Eliquis    EKG reviewed by DARCI Medina. V/o's received to continue meds as prescribed and schedule 4 mo. F/u. Patient aware, verbalized ok. PH,LPN  ?   ?   ?

## 2023-03-29 ENCOUNTER — OFFICE VISIT (OUTPATIENT)
Dept: UROLOGY | Facility: CLINIC | Age: 73
End: 2023-03-29
Payer: MEDICARE

## 2023-03-29 VITALS
BODY MASS INDEX: 29.68 KG/M2 | WEIGHT: 212 LBS | DIASTOLIC BLOOD PRESSURE: 87 MMHG | HEIGHT: 71 IN | HEART RATE: 102 BPM | OXYGEN SATURATION: 97 % | SYSTOLIC BLOOD PRESSURE: 123 MMHG

## 2023-03-29 DIAGNOSIS — R97.20 ELEVATED PROSTATE SPECIFIC ANTIGEN (PSA): Primary | ICD-10-CM

## 2023-03-29 PROCEDURE — 1159F MED LIST DOCD IN RCRD: CPT

## 2023-03-29 PROCEDURE — 3079F DIAST BP 80-89 MM HG: CPT

## 2023-03-29 PROCEDURE — 1160F RVW MEDS BY RX/DR IN RCRD: CPT

## 2023-03-29 PROCEDURE — 3074F SYST BP LT 130 MM HG: CPT

## 2023-03-29 PROCEDURE — 99213 OFFICE O/P EST LOW 20 MIN: CPT

## 2023-03-29 NOTE — PROGRESS NOTES
"Chief Complaint:    Chief Complaint   Patient presents with   • Elevated PSA       Vital Signs:   /87 (BP Location: Left arm, Patient Position: Sitting, Cuff Size: Adult)   Pulse 102   Ht 180.3 cm (70.98\")   Wt 96.2 kg (212 lb)   SpO2 97%   BMI 29.58 kg/m²   Body mass index is 29.58 kg/m².      HPI:  Clarence Melo is a 72 y.o. male who presents today for follow up    History of Present Illness  Mr. Melo presents to the clinic for a 1 month follow-up for elevated PSA.  My initially saw the patient he had elevated PSA around 5.25.  This was his first time with elevated PSA and he had no family history of prostate cancer.  I repeated a PSA free and total in office that day.  His PSA total came down to 4.0.  He had a free PSA percentage of 26%.  This correlated to a roughly 9% risk of prostate carcinoma.  I had also sent out MDX select noninvasive liquid biopsy test I gave him a 44% risk for positive biopsy.  At less than 17% risk for a Tito score greater than 7.  He denies any significant lower urinary tract symptoms.      Past Medical History:  Past Medical History:   Diagnosis Date   • Atrial fibrillation (HCC)    • COVID 03/2022    no hospitalization; mild symptoms   • Dry eyes    • Environmental allergies    • Hyperlipidemia    • Hypertension    • hypothyroidism    • Pre-diabetes     diet and exercise controlled   • Sleep apnea     has a c-pap       Current Meds:  Current Outpatient Medications   Medication Sig Dispense Refill   • acetaminophen (TYLENOL) 500 MG tablet Take 2 tablets by mouth Every 6 (Six) Hours As Needed for Mild Pain or Headache.     • apixaban (ELIQUIS) 5 MG tablet tablet Take 1 tablet by mouth 2 (Two) Times a Day. 180 tablet 3   • Artificial Tear Ointment (DRY EYES OP) Apply 1 dose to eye(s) as directed by provider As Needed.     • CRESTOR 20 MG tablet Take 1 tablet by mouth Every Night.     • flecainide (TAMBOCOR) 50 MG tablet Take 1 tablet by mouth 2 (Two) Times a Day. 60 tablet " 11   • fluticasone (FLONASE) 50 MCG/ACT nasal spray 2 sprays into the nostril(s) as directed by provider As Needed for Rhinitis.     • levothyroxine (SYNTHROID, LEVOTHROID) 25 MCG tablet Take 1 tablet by mouth Every Morning.     • loratadine (CLARITIN) 10 MG tablet Take 1 tablet by mouth As Needed for Allergies.     • metroNIDAZOLE (METROGEL) 1 % gel 1 application As Needed.     • mirtazapine (REMERON) 15 MG tablet Take 1 tablet by mouth Every Night.     • tadalafil (CIALIS) 5 MG tablet Take 1 tablet by mouth Daily.       No current facility-administered medications for this visit.        Allergies:   No Known Allergies     Past Surgical History:  Past Surgical History:   Procedure Laterality Date   • CARDIAC ABLATION  2022   • CARDIAC ELECTROPHYSIOLOGY PROCEDURE N/A 2022    Procedure: Ablation atrial fibrillation. Hold Eliquis night before procedure;  Surgeon: Benedicto Gupta MD;  Location: [facility] INVAS[facility] LOC[facility];  Service: Cardiovascular;  Laterality: N/A;   • CARDIOVERSION  05/26/2022    x2   • COLONOSCOPY     • NASAL SEPTUM SURGERY     • SINUS SURGERY         Social History:  Social History     Socioeconomic History   • Marital status: Significant Other   Tobacco Use   • Smoking status: Former     Packs/day: 0.25     Years: 8.00     Pack years: 2.00     Types: Cigarettes     Quit date:      Years since quittin.2   • Smokeless tobacco: Never   Vaping Use   • Vaping Use: Never used   Substance and Sexual Activity   • Alcohol use: Yes     Alcohol/week: 1.0 standard drink     Types: 1 Cans of beer per week     Comment: socially   • Drug use: No   • Sexual activity: Defer       Family History:  Family History   Problem Relation Age of Onset   • Dementia Mother    • Heart disease Father    • Hyperlipidemia Father    • No Known Problems Sister    • No Known Problems Brother    • No Known Problems Maternal Grandmother    • No Known Problems Maternal Grandfather    • No Known Problems Paternal  Grandmother    • No Known Problems Paternal Grandfather    • No Known Problems Maternal Aunt    • No Known Problems Maternal Uncle    • No Known Problems Paternal Aunt    • No Known Problems Paternal Uncle    • Alzheimer's disease Other    • Heart failure Other    • Anemia Neg Hx    • Arrhythmia Neg Hx    • Asthma Neg Hx    • Clotting disorder Neg Hx    • Fainting Neg Hx    • Heart attack Neg Hx    • Hypertension Neg Hx        Review of Systems:  Review of Systems   Constitutional: Negative for chills, fatigue, fever and unexpected weight change.   Respiratory: Positive for shortness of breath. Negative for cough, chest tightness and wheezing.    Cardiovascular: Negative for chest pain and leg swelling.   Gastrointestinal: Negative for abdominal pain, constipation, diarrhea, nausea and vomiting.   Genitourinary: Negative for difficulty urinating, dysuria, frequency, penile pain and urgency.   Musculoskeletal: Negative for back pain and joint swelling.   Skin: Negative for rash.   Neurological: Negative for dizziness and headaches.   Psychiatric/Behavioral: Negative for confusion and suicidal ideas.       Physical Exam:  Physical Exam  Constitutional:       General: He is not in acute distress.     Appearance: Normal appearance.   HENT:      Head: Normocephalic and atraumatic.      Nose: Nose normal.      Mouth/Throat:      Mouth: Mucous membranes are moist.   Eyes:      Conjunctiva/sclera: Conjunctivae normal.   Cardiovascular:      Rate and Rhythm: Normal rate and regular rhythm.      Pulses: Normal pulses.      Heart sounds: Normal heart sounds.   Pulmonary:      Effort: Pulmonary effort is normal.      Breath sounds: Normal breath sounds.   Abdominal:      General: Bowel sounds are normal.      Palpations: Abdomen is soft.      Tenderness: There is no right CVA tenderness or left CVA tenderness.   Musculoskeletal:         General: Normal range of motion.      Cervical back: Normal range of motion.   Skin:      General: Skin is warm.   Neurological:      General: No focal deficit present.      Mental Status: He is alert and oriented to person, place, and time.   Psychiatric:         Mood and Affect: Mood normal.         Behavior: Behavior normal.         Thought Content: Thought content normal.         Judgment: Judgment normal.           Recent Image (CT and/or KUB):   CT Abdomen and Pelvis: No results found for this or any previous visit.     CT Stone Protocol: No results found for this or any previous visit.     KUB: No results found for this or any previous visit.       Labs:  Brief Urine Lab Results     None        Office Visit on 02/23/2023   Component Date Value Ref Range Status   • PSA 02/23/2023 4.0  0.0 - 4.0 ng/mL Final    Roche ECLIA methodology.  According to the American Urological Association, Serum PSA should  decrease and remain at undetectable levels after radical  prostatectomy. The AUA defines biochemical recurrence as an initial  PSA value 0.2 ng/mL or greater followed by a subsequent confirmatory  PSA value 0.2 ng/mL or greater.  Values obtained with different assay methods or kits cannot be used  interchangeably. Results cannot be interpreted as absolute evidence  of the presence or absence of malignant disease.   • PSA, Free 02/23/2023 1.05  N/A ng/mL Final    Roche ECLIA methodology.   • PSA, Free % 02/23/2023 26.3  % Final    The table below lists the probability of prostate cancer for  men with non-suspicious CHENTE results and total PSA between  4 and 10 ng/mL, by patient age (Gill et al, VIANEY 1998,  279:1542).                    % Free PSA       50-64 yr        65-75 yr                    0.00-10.00%        56%             55%                   10.01-15.00%        24%             35%                   15.01-20.00%        17%             23%                   20.01-25.00%        10%             20%                        >25.00%         5%              9%  Please note:  Gill gomez al did not  make specific                recommendations regarding the use of                percent free PSA for any other population                of men.        Procedure: None  Procedures     I have reviewed and agree with the above PMH, PSH, FMH, social history, medications, allergies, and labs.     Assessment/Plan:   Problem List Items Addressed This Visit        Genitourinary and Reproductive     Elevated prostate specific antigen (PSA) - Primary       Health Maintenance:   Health Maintenance Due   Topic Date Due   • COLORECTAL CANCER SCREENING  Never done   • TDAP/TD VACCINES (1 - Tdap) Never done   • ZOSTER VACCINE (1 of 2) Never done   • Pneumococcal Vaccine 65+ (1 - PCV) Never done   • HEPATITIS C SCREENING  Never done   • ANNUAL WELLNESS VISIT  Never done   • LIPID PANEL  Never done   • COVID-19 Vaccine (4 - Booster for Pfizer series) 11/22/2021   • INFLUENZA VACCINE  08/01/2022        Smoking Counseling: Former smoker.  Never used smokeless tobacco.    Urine Incontinence: Patient reports that he is not currently experiencing any symptoms of urinary incontinence.    Patient was given instructions and counseling regarding his condition or for health maintenance advice. Please see specific information pulled into the AVS if appropriate.    Patient Education:   Elevated PSA -patient had initially elevated PSA of roughly 5.25.  Repeated PSA free and total.  His total PSA decreased to 4 and he had a free PSA percentage of 26%. I discussed the pathophysiology of PSA testing indicating its use in the diagnosis and management of prostate cancer.  I discussed the normal range being 0 to 4, but more appropriately being much closer to 0 to 2 in a normal male.  I discussed the fact that after a certain age it is against recommendation to use PSA testing especially in view of numerous comorbidities.  I discussed many of the things that can artificially raise PSA including put not limited to a recent infection, urinary tract  infection, recent sexual intercourse, or even the type of movement such as manipulation of the prostate from riding a bicycle.  It was discussed that the most important use of PSA is the velocity measurement.  This refers to the change of PSA with time. I discussed that we look for greater than 20% rise over a year to help us make the prediction of prostate cancer.  I also discussed that in the case of prostate cancer indicating a radical prostatectomy, the PSA should be 0 and any rise indicates an early biochemical recurrence.  He has no significant lower urinary tract symptoms at this time.  I am recommending repeat PSA in 1 year.  Advised patient he has less than 9% risk for prostate cancer.  Vies him return to the clinic sooner if needed.  Patient verbalized understanding and agrees to plan of care.    Visit Diagnoses:    ICD-10-CM ICD-9-CM   1. Elevated prostate specific antigen (PSA)  R97.20 790.93       Meds Ordered During Visit:  No orders of the defined types were placed in this encounter.      Follow Up Appointment: 1 year  No follow-ups on file.      This document has been electronically signed by Brayan Reyes PA-C   March 29, 2023 16:12 EDT    Part of this note may be an electronic transcription/translation of spoken language to printed text using the Dragon Dictation System.

## 2023-04-05 ENCOUNTER — TELEPHONE (OUTPATIENT)
Dept: CARDIOLOGY | Facility: CLINIC | Age: 73
End: 2023-04-05
Payer: MEDICARE

## 2023-04-05 NOTE — TELEPHONE ENCOUNTER
Patient assistance is complete I will put it on Jr desk for signature. Once signed I will send it in .      Laurie JACOBO

## 2023-07-21 PROBLEM — I50.22 CHRONIC SYSTOLIC CHF (CONGESTIVE HEART FAILURE): Status: ACTIVE | Noted: 2023-07-21

## 2023-07-28 ENCOUNTER — HOSPITAL ENCOUNTER (OUTPATIENT)
Dept: CARDIOLOGY | Facility: HOSPITAL | Age: 73
Discharge: HOME | End: 2023-07-28
Payer: MEDICARE

## 2023-07-28 VITALS — HEIGHT: 71 IN | BODY MASS INDEX: 29.1 KG/M2 | WEIGHT: 207.89 LBS

## 2023-07-28 DIAGNOSIS — I50.22 CHRONIC SYSTOLIC CHF (CONGESTIVE HEART FAILURE): ICD-10-CM

## 2023-07-28 DIAGNOSIS — I48.92 PAROXYSMAL ATRIAL FLUTTER: ICD-10-CM

## 2023-07-28 LAB
AORTIC DIMENSIONLESS INDEX: 0.91 (DI)
BH CV ECHO MEAS - AO MAX PG: 3.8 MMHG
BH CV ECHO MEAS - AO MEAN PG: 2 MMHG
BH CV ECHO MEAS - AO ROOT DIAM: 3.7 CM
BH CV ECHO MEAS - AO V2 MAX: 97.6 CM/SEC
BH CV ECHO MEAS - AO V2 VTI: 17.7 CM
BH CV ECHO MEAS - EDV(CUBED): 88.1 ML
BH CV ECHO MEAS - EF_3D-VOL: 50 %
BH CV ECHO MEAS - ESV(CUBED): 32.5 ML
BH CV ECHO MEAS - FS: 28.3 %
BH CV ECHO MEAS - IVS/LVPW: 0.96 CM
BH CV ECHO MEAS - IVSD: 0.98 CM
BH CV ECHO MEAS - LA DIMENSION: 3.1 CM
BH CV ECHO MEAS - LAT PEAK E' VEL: 7.7 CM/SEC
BH CV ECHO MEAS - LV MASS(C)D: 150.2 GRAMS
BH CV ECHO MEAS - LV MAX PG: 3.2 MMHG
BH CV ECHO MEAS - LV MEAN PG: 2 MMHG
BH CV ECHO MEAS - LV V1 MAX: 89.3 CM/SEC
BH CV ECHO MEAS - LV V1 VTI: 14.7 CM
BH CV ECHO MEAS - LVIDD: 4.5 CM
BH CV ECHO MEAS - LVIDS: 3.2 CM
BH CV ECHO MEAS - LVPWD: 1.02 CM
BH CV ECHO MEAS - MED PEAK E' VEL: 7.3 CM/SEC
BH CV ECHO MEAS - MV A MAX VEL: 57.9 CM/SEC
BH CV ECHO MEAS - MV DEC SLOPE: 354 CM/SEC2
BH CV ECHO MEAS - MV DEC TIME: 0.17 MSEC
BH CV ECHO MEAS - MV E MAX VEL: 70.1 CM/SEC
BH CV ECHO MEAS - MV E/A: 1.21
BH CV ECHO MEAS - MV MAX PG: 2.5 MMHG
BH CV ECHO MEAS - MV MEAN PG: 1.21 MMHG
BH CV ECHO MEAS - MV P1/2T: 68.7 MSEC
BH CV ECHO MEAS - MV V2 VTI: 20.9 CM
BH CV ECHO MEAS - MVA(P1/2T): 3.2 CM2
BH CV ECHO MEAS - PA V2 MAX: 98.5 CM/SEC
BH CV ECHO MEAS - RAP SYSTOLE: 8 MMHG
BH CV ECHO MEAS - RV MAX PG: 2.04 MMHG
BH CV ECHO MEAS - RV V1 MAX: 71.4 CM/SEC
BH CV ECHO MEAS - RV V1 VTI: 15.9 CM
BH CV ECHO MEAS - RVSP: 29.7 MMHG
BH CV ECHO MEAS - TAPSE (>1.6): 2.8 CM
BH CV ECHO MEAS - TR MAX PG: 21.7 MMHG
BH CV ECHO MEAS - TR MAX VEL: 232.8 CM/SEC
BH CV ECHO MEASUREMENTS AVERAGE E/E' RATIO: 9.35
BH CV XLRA - TDI S': 14.6 CM/SEC
SINUS: 3.4 CM

## 2023-07-28 PROCEDURE — 93306 TTE W/DOPPLER COMPLETE: CPT

## 2023-08-21 ENCOUNTER — TELEPHONE (OUTPATIENT)
Dept: CARDIOLOGY | Facility: CLINIC | Age: 73
End: 2023-08-21
Payer: MEDICARE

## 2023-08-21 NOTE — TELEPHONE ENCOUNTER
Called patient today with results of his echocardiogram and Zio patch.  His echocardiogram showed normalization of his EF 55 to 60%.  There was note of mitral valve with 2 sclerotic nodules on the anterior mitral leaflet.  I reviewed this with one of our cardiologists here at [facility], who felt that this was a minor finding with no further evaluation needed.  Zio patch 7/24 to 8/6/2023 showed normal sinus rhythm with average heart rate 83 bpm, with short runs of atrial tachycardia without symptoms and no evidence of atrial fibrillation or significant bradycardia/pauses.     I left a message/VM with the patient to call us back as he did not answer his phone.  When he calls back please inform him that both echocardiogram and Zio patch were unremarkable and to continue current medical treatment.    Electronically signed by ELADIO Lazaro, 08/21/23, 9:53 AM EDT.

## 2023-08-22 NOTE — TELEPHONE ENCOUNTER
I spoke with patient per Latanya Luna recommendation.  Patient states he is feeling very well and walking 1-2 miles a day.  He verbalized understanding and was very pleased with the results.

## 2023-08-31 ENCOUNTER — OFFICE VISIT (OUTPATIENT)
Dept: CARDIOLOGY | Facility: CLINIC | Age: 73
End: 2023-08-31
Payer: MEDICARE

## 2023-08-31 VITALS
DIASTOLIC BLOOD PRESSURE: 79 MMHG | WEIGHT: 206 LBS | HEIGHT: 71 IN | OXYGEN SATURATION: 94 % | BODY MASS INDEX: 28.84 KG/M2 | HEART RATE: 84 BPM | SYSTOLIC BLOOD PRESSURE: 123 MMHG | RESPIRATION RATE: 18 BRPM

## 2023-08-31 DIAGNOSIS — I48.0 PAROXYSMAL ATRIAL FIBRILLATION: Primary | ICD-10-CM

## 2023-08-31 DIAGNOSIS — R00.2 PALPITATIONS: ICD-10-CM

## 2023-08-31 DIAGNOSIS — R53.82 CHRONIC FATIGUE: ICD-10-CM

## 2023-08-31 RX ORDER — HYDROXYZINE PAMOATE 25 MG/1
25 CAPSULE ORAL NIGHTLY PRN
Qty: 30 CAPSULE | Refills: 1 | Status: SHIPPED | OUTPATIENT
Start: 2023-08-31

## 2023-08-31 NOTE — LETTER
August 31, 2023       No Recipients    Patient: Clarence Melo   YOB: 1950   Date of Visit: 8/31/2023       Dear ANTHONY Oswald    Clarence Melo was in my office today. Below is a copy of my note.    If you have questions, please do not hesitate to call me. I look forward to following Clarence along with you.         Sincerely,        ELADIO Jackson        CC:   No Recipients    Problem list     Subjective  Clarence Melo is a 73 y.o. male     Chief Complaint   Patient presents with    Follow-up     Holter, Echo results   Problem list:     1. Low risk stress test 2016  1.1 low risk stress test January 2018  2. Preserved systolic function  Two-point 1 repeat echo in August 2023 with normal systolic function noted  3. Dyslipidemia  4. Obstructive sleep apnea being treated with CPAP  5.  Atrial fibrillation/flutter  5.1 diagnosed during ER visit and hospitalization at [facility] December 2021  5.2 recent electrocardioversion performed at [facility] in [place]  5.3 pulmonary vein ablation of atrial flutter June 2022  5.4 patient previously on sotalol and flecainide.  6.  Mitral regurgitation  6.1 moderate mitral regurgitation by echocardiogram March 2022  6.2 trivial to mild MR by echocardiogram August 2023       HPI    Patient is a 73-year-old male who presents back to the office for follow-up last visit, he came in because of palpitations and was placed on flecainide.  He continued to complain of fatigue.  He saw EP services who stopped that medication.  They were concerned about his systolic function and stopped flecainide.  By recent echocardiogram and previous echocardiograms, systolic function has been normal.  There was 1 echocardiogram performed at a local hospital that suggested an EF of 40 to 45%.  Follow-up echocardiograms have been normal.  I am not sure if EF assessment was  during the time that he was in atrial fibrillation.    Regardless, he is doing  well.  Recent Holter monitor does not demonstrate any atrial fibrillation or flutter.  He occasionally might sense palpitations but he feels improved.  No chest pain.  No shortness of breath.  No PND or orthopnea.    Patient doing well otherwise.      Current Outpatient Medications on File Prior to Visit   Medication Sig Dispense Refill    acetaminophen (TYLENOL) 500 MG tablet Take 2 tablets by mouth Every 6 (Six) Hours As Needed for Mild Pain or Headache.      apixaban (ELIQUIS) 5 MG tablet tablet Take 1 tablet by mouth 2 (Two) Times a Day. 60 tablet 11    Artificial Tear Ointment (DRY EYES OP) Apply 1 dose to eye(s) as directed by provider As Needed.      CRESTOR 20 MG tablet Take 1 tablet by mouth Every Night.      fluticasone (FLONASE) 50 MCG/ACT nasal spray 2 sprays into the nostril(s) as directed by provider As Needed for Rhinitis.      levothyroxine (SYNTHROID, LEVOTHROID) 25 MCG tablet Take 1 tablet by mouth Every Morning.      loratadine (CLARITIN) 10 MG tablet Take 1 tablet by mouth As Needed for Allergies.      tadalafil (CIALIS) 5 MG tablet Take 1 tablet by mouth Daily.      metroNIDAZOLE (METROGEL) 1 % gel 1 application As Needed. (Patient not taking: Reported on 8/31/2023)      mirtazapine (REMERON) 15 MG tablet Take 1 tablet by mouth Every Night. (Patient not taking: Reported on 8/31/2023)       No current facility-administered medications on file prior to visit.       Patient has no known allergies.    Past Medical History:   Diagnosis Date    Atrial fibrillation     COVID 03/2022    no hospitalization; mild symptoms    Dry eyes     Environmental allergies     Hyperlipidemia     Hypertension     hypothyroidism     Pre-diabetes     diet and exercise controlled    Sleep apnea     has a c-pap       Social History     Socioeconomic History    Marital status: Significant Other   Tobacco Use    Smoking status: Former     Packs/day: 0.25     Years: 8.00     Pack years: 2.00     Types:  "Cigarettes     Quit date:      Years since quittin.6    Smokeless tobacco: Never   Vaping Use    Vaping Use: Never used   Substance and Sexual Activity    Alcohol use: Yes     Alcohol/week: 1.0 standard drink     Types: 1 Cans of beer per week     Comment: rarely    Drug use: No    Sexual activity: Defer       Family History   Problem Relation Age of Onset    Dementia Mother     Heart disease Father     Hyperlipidemia Father     No Known Problems Sister     No Known Problems Brother     No Known Problems Maternal Grandmother     No Known Problems Maternal Grandfather     No Known Problems Paternal Grandmother     No Known Problems Paternal Grandfather     No Known Problems Maternal Aunt     No Known Problems Maternal Uncle     No Known Problems Paternal Aunt     No Known Problems Paternal Uncle     Alzheimer's disease Other     Heart failure Other     Anemia Neg Hx     Arrhythmia Neg Hx     Asthma Neg Hx     Clotting disorder Neg Hx     Fainting Neg Hx     Heart attack Neg Hx     Hypertension Neg Hx        Review of Systems   Constitutional: Negative.    HENT: Negative.     Eyes:  Positive for pain and itching.   Respiratory:  Positive for apnea. Negative for shortness of breath.    Cardiovascular: Negative.    Gastrointestinal: Negative.    Endocrine: Negative.    Genitourinary: Negative.    Musculoskeletal:  Positive for neck stiffness.   Allergic/Immunologic: Positive for environmental allergies.   Neurological: Negative.    Hematological:  Bruises/bleeds easily.   Psychiatric/Behavioral:  Positive for sleep disturbance.      Objective  Vitals:    23 1035   BP: 123/79   BP Location: Left arm   Patient Position: Sitting   Cuff Size: Adult   Pulse: 84   Resp: 18   SpO2: 94%   Weight: 93.4 kg (206 lb)   Height: 180.3 cm (71\")      /79 (BP Location: Left arm, Patient Position: Sitting, Cuff Size: Adult)   Pulse 84   Resp 18   Ht 180.3 cm (71\")   Wt 93.4 kg (206 lb)  "  SpO2 94%   BMI 28.73 kg/mý     Lab Results (most recent)       None            Physical Exam  Vitals and nursing note reviewed.   Constitutional:       General: He is not in acute distress.     Appearance: Normal appearance. He is well-developed.   HENT:      Head: Normocephalic and atraumatic.   Eyes:      General: No scleral icterus.        Right eye: No discharge.         Left eye: No discharge.      Conjunctiva/sclera: Conjunctivae normal.   Neck:      Vascular: No carotid bruit.   Cardiovascular:      Rate and Rhythm: Normal rate and regular rhythm.      Heart sounds: Normal heart sounds. No murmur heard.    No friction rub. No gallop.   Pulmonary:      Effort: Pulmonary effort is normal. No respiratory distress.      Breath sounds: Normal breath sounds. No wheezing or rales.   Chest:      Chest wall: No tenderness.   Musculoskeletal:      Right lower leg: No edema.      Left lower leg: No edema.   Skin:     General: Skin is warm and dry.      Coloration: Skin is not pale.      Findings: No erythema or rash.   Neurological:      Mental Status: He is alert and oriented to person, place, and time.      Cranial Nerves: No cranial nerve deficit.   Psychiatric:         Behavior: Behavior normal.       Procedure  Procedures       Assessment & Plan    Problems Addressed this Visit          Cardiac and Vasculature    Palpitations    Paroxysmal atrial fibrillation - Primary       Symptoms and Signs    Chronic fatigue     Diagnoses         Codes Comments    Paroxysmal atrial fibrillation    -  Primary ICD-10-CM: I48.0  ICD-9-CM: 427.31     Chronic fatigue     ICD-10-CM: R53.82  ICD-9-CM: 780.79     Palpitations     ICD-10-CM: R00.2  ICD-9-CM: 785.1           Advance Care Planning   ACP discussion was held with the patient during this visit. Patient has an advance directive (not in EMR), copy requested.          Recommendation  1.  Patient is a 73-year-old male that is doing well at this time.  No breakthrough A-fib  was noted on recent Holter monitor.  He is doing well without medication.  For now, as long as he is doing well without medication, we will continue Eliquis but nothing further.    2.  Patient with chronic fatigue in the past but feels improved.  We will monitor for now.    3.  Occasional palpitations but no significant arrhythmias.  He feels remarkably well at this point.  We can see him back for follow-up in 6 months to year or sooner as symptoms discussed.  Follow-up with primary as scheduled.       Clarence Melo  reports that he quit smoking about 47 years ago. His smoking use included cigarettes. He has a 2.00 pack-year smoking history. He has never used smokeless tobacco.. I have educated him on the risk of diseases from using tobacco products            Electronically signed by:

## 2023-08-31 NOTE — PROGRESS NOTES
Problem list     Subjective   Clarence Melo is a 73 y.o. male     Chief Complaint   Patient presents with    Follow-up     Holter, Echo results   Problem list:     1. Low risk stress test 2016  1.1 low risk stress test January 2018  2. Preserved systolic function  Two-point 1 repeat echo in August 2023 with normal systolic function noted  3. Dyslipidemia  4. Obstructive sleep apnea being treated with CPAP  5.  Atrial fibrillation/flutter  5.1 diagnosed during ER visit and hospitalization at [facility] December 2021  5.2 recent electrocardioversion performed at [facility] in [place]  5.3 pulmonary vein ablation of atrial flutter June 2022  5.4 patient previously on sotalol and flecainide.  6.  Mitral regurgitation  6.1 moderate mitral regurgitation by echocardiogram March 2022  6.2 trivial to mild MR by echocardiogram August 2023       HPI    Patient is a 73-year-old male who presents back to the office for follow-up last visit, he came in because of palpitations and was placed on flecainide.  He continued to complain of fatigue.  He saw EP services who stopped that medication.  They were concerned about his systolic function and stopped flecainide.  By recent echocardiogram and previous echocardiograms, systolic function has been normal.  There was 1 echocardiogram performed at a local hospital that suggested an EF of 40 to 45%.  Follow-up echocardiograms have been normal.  I am not sure if EF assessment was  during the time that he was in atrial fibrillation.    Regardless, he is doing well.  Recent Holter monitor does not demonstrate any atrial fibrillation or flutter.  He occasionally might sense palpitations but he feels improved.  No chest pain.  No shortness of breath.  No PND or orthopnea.    Patient doing well otherwise.      Current Outpatient Medications on File Prior to Visit   Medication Sig Dispense Refill    acetaminophen (TYLENOL) 500 MG tablet Take 2 tablets by mouth  Every 6 (Six) Hours As Needed for Mild Pain or Headache.      apixaban (ELIQUIS) 5 MG tablet tablet Take 1 tablet by mouth 2 (Two) Times a Day. 60 tablet 11    Artificial Tear Ointment (DRY EYES OP) Apply 1 dose to eye(s) as directed by provider As Needed.      CRESTOR 20 MG tablet Take 1 tablet by mouth Every Night.      fluticasone (FLONASE) 50 MCG/ACT nasal spray 2 sprays into the nostril(s) as directed by provider As Needed for Rhinitis.      levothyroxine (SYNTHROID, LEVOTHROID) 25 MCG tablet Take 1 tablet by mouth Every Morning.      loratadine (CLARITIN) 10 MG tablet Take 1 tablet by mouth As Needed for Allergies.      tadalafil (CIALIS) 5 MG tablet Take 1 tablet by mouth Daily.      metroNIDAZOLE (METROGEL) 1 % gel 1 application As Needed. (Patient not taking: Reported on 2023)      mirtazapine (REMERON) 15 MG tablet Take 1 tablet by mouth Every Night. (Patient not taking: Reported on 2023)       No current facility-administered medications on file prior to visit.       Patient has no known allergies.    Past Medical History:   Diagnosis Date    Atrial fibrillation     COVID 2022    no hospitalization; mild symptoms    Dry eyes     Environmental allergies     Hyperlipidemia     Hypertension     hypothyroidism     Pre-diabetes     diet and exercise controlled    Sleep apnea     has a c-pap       Social History     Socioeconomic History    Marital status: Significant Other   Tobacco Use    Smoking status: Former     Packs/day: 0.25     Years: 8.00     Pack years: 2.00     Types: Cigarettes     Quit date:      Years since quittin.6    Smokeless tobacco: Never   Vaping Use    Vaping Use: Never used   Substance and Sexual Activity    Alcohol use: Yes     Alcohol/week: 1.0 standard drink     Types: 1 Cans of beer per week     Comment: rarely    Drug use: No    Sexual activity: Defer       Family History   Problem Relation Age of Onset    Dementia Mother     Heart disease Father      "Hyperlipidemia Father     No Known Problems Sister     No Known Problems Brother     No Known Problems Maternal Grandmother     No Known Problems Maternal Grandfather     No Known Problems Paternal Grandmother     No Known Problems Paternal Grandfather     No Known Problems Maternal Aunt     No Known Problems Maternal Uncle     No Known Problems Paternal Aunt     No Known Problems Paternal Uncle     Alzheimer's disease Other     Heart failure Other     Anemia Neg Hx     Arrhythmia Neg Hx     Asthma Neg Hx     Clotting disorder Neg Hx     Fainting Neg Hx     Heart attack Neg Hx     Hypertension Neg Hx        Review of Systems   Constitutional: Negative.    HENT: Negative.     Eyes:  Positive for pain and itching.   Respiratory:  Positive for apnea. Negative for shortness of breath.    Cardiovascular: Negative.    Gastrointestinal: Negative.    Endocrine: Negative.    Genitourinary: Negative.    Musculoskeletal:  Positive for neck stiffness.   Allergic/Immunologic: Positive for environmental allergies.   Neurological: Negative.    Hematological:  Bruises/bleeds easily.   Psychiatric/Behavioral:  Positive for sleep disturbance.      Objective   Vitals:    08/31/23 1035   BP: 123/79   BP Location: Left arm   Patient Position: Sitting   Cuff Size: Adult   Pulse: 84   Resp: 18   SpO2: 94%   Weight: 93.4 kg (206 lb)   Height: 180.3 cm (71\")      /79 (BP Location: Left arm, Patient Position: Sitting, Cuff Size: Adult)   Pulse 84   Resp 18   Ht 180.3 cm (71\")   Wt 93.4 kg (206 lb)   SpO2 94%   BMI 28.73 kg/mý     Lab Results (most recent)       None            Physical Exam  Vitals and nursing note reviewed.   Constitutional:       General: He is not in acute distress.     Appearance: Normal appearance. He is well-developed.   HENT:      Head: Normocephalic and atraumatic.   Eyes:      General: No scleral icterus.        Right eye: No discharge.         Left eye: No discharge.      Conjunctiva/sclera: " Conjunctivae normal.   Neck:      Vascular: No carotid bruit.   Cardiovascular:      Rate and Rhythm: Normal rate and regular rhythm.      Heart sounds: Normal heart sounds. No murmur heard.    No friction rub. No gallop.   Pulmonary:      Effort: Pulmonary effort is normal. No respiratory distress.      Breath sounds: Normal breath sounds. No wheezing or rales.   Chest:      Chest wall: No tenderness.   Musculoskeletal:      Right lower leg: No edema.      Left lower leg: No edema.   Skin:     General: Skin is warm and dry.      Coloration: Skin is not pale.      Findings: No erythema or rash.   Neurological:      Mental Status: He is alert and oriented to person, place, and time.      Cranial Nerves: No cranial nerve deficit.   Psychiatric:         Behavior: Behavior normal.       Procedure   Procedures       Assessment & Plan     Problems Addressed this Visit          Cardiac and Vasculature    Palpitations    Paroxysmal atrial fibrillation - Primary       Symptoms and Signs    Chronic fatigue     Diagnoses         Codes Comments    Paroxysmal atrial fibrillation    -  Primary ICD-10-CM: I48.0  ICD-9-CM: 427.31     Chronic fatigue     ICD-10-CM: R53.82  ICD-9-CM: 780.79     Palpitations     ICD-10-CM: R00.2  ICD-9-CM: 785.1           Advance Care Planning   ACP discussion was held with the patient during this visit. Patient has an advance directive (not in EMR), copy requested.          Recommendation  1.  Patient is a 73-year-old male that is doing well at this time.  No breakthrough A-fib was noted on recent Holter monitor.  He is doing well without medication.  For now, as long as he is doing well without medication, we will continue Eliquis but nothing further.    2.  Patient with chronic fatigue in the past but feels improved.  We will monitor for now.    3.  Occasional palpitations but no significant arrhythmias.  He feels remarkably well at this point.  We can see him back for follow-up in 6 months to year  or sooner as symptoms discussed.  Follow-up with primary as scheduled.       Clarence Melo  reports that he quit smoking about 47 years ago. His smoking use included cigarettes. He has a 2.00 pack-year smoking history. He has never used smokeless tobacco.. I have educated him on the risk of diseases from using tobacco products            Electronically signed by:

## 2023-10-20 ENCOUNTER — OFFICE VISIT (OUTPATIENT)
Dept: CARDIOLOGY | Facility: CLINIC | Age: 73
End: 2023-10-20
Payer: MEDICARE

## 2023-10-20 VITALS
SYSTOLIC BLOOD PRESSURE: 116 MMHG | WEIGHT: 200 LBS | OXYGEN SATURATION: 95 % | BODY MASS INDEX: 28 KG/M2 | HEIGHT: 71 IN | DIASTOLIC BLOOD PRESSURE: 78 MMHG | HEART RATE: 88 BPM

## 2023-10-20 DIAGNOSIS — I48.4 ATYPICAL ATRIAL FLUTTER: Primary | ICD-10-CM

## 2023-10-20 DIAGNOSIS — I48.92 PAROXYSMAL ATRIAL FLUTTER: ICD-10-CM

## 2023-10-20 PROCEDURE — 3074F SYST BP LT 130 MM HG: CPT | Performed by: STUDENT IN AN ORGANIZED HEALTH CARE EDUCATION/TRAINING PROGRAM

## 2023-10-20 PROCEDURE — 99213 OFFICE O/P EST LOW 20 MIN: CPT | Performed by: STUDENT IN AN ORGANIZED HEALTH CARE EDUCATION/TRAINING PROGRAM

## 2023-10-20 PROCEDURE — 3078F DIAST BP <80 MM HG: CPT | Performed by: STUDENT IN AN ORGANIZED HEALTH CARE EDUCATION/TRAINING PROGRAM

## 2023-10-20 NOTE — PROGRESS NOTES
Cardiac Electrophysiology Outpatient Note  [facility] at [facility]    Office Visit     Clarence Melo  [id number]  10/21/2022    Primary Care Physician: Sabrina Ruth APRN    Referred By: No ref. provider found    CC:   Chief Complaint   Patient presents with    Atrial Fibrillation     PROBLEM LIST:  Atrial flutter  Atrial fibrillation  Diagnosed 12/2021. Started on amiodarone.   CHADSVASC = 2, on Eliquis  Echo 1/27/22: EF 55-60%, grade III diastolic dysfunction, mild restrictive physiology, LA mild to moderately dilated RA mildly enlarged. RV upper limits of normal. Mild MR. Trivial AI. RVSP 20.  Monitor 12/20-1/2/2022: 2% Afib/flutter. (70)bpm.   Echo 3/2022: EF 40-45%. Mild aortic sclerosis with trace AI, moderate MR, mild thickening of mitral valve leaflets.   S/p successful ECV, 3/2022.   Recurrent Aflutter noted 5/2022  S/p successful ECV, 5/26/22.  Atrial fibrillation ablation, 6/27/2022, Dr. Gupta.  Successful PVI, ablation of CTI dependent atrial flutter  Atrial flutter recurrence 07/2022, Sotalol restarted  Systolic heart failure  Echo 1/27/22: EF 55-60%, grade III diastolic dysfunction,  Echo 3/2022: EF 40-45%.   Hyperlipidemia  Obstructive sleep apnea   Compliant with CPAP    History of Present Illness:   Clarence Melo is a 73 y.o. male who presents to the electrophysiology clinic for follow up of atrial fibrillation/ flutter. Since his ablation he reports he is feeling better overall. Unfortunately he did have recurrence after his ablation in the setting of pneumonia. He was started on sotalol and has had no recurrence this.  He is since been weaned off the sotalol without symptomatic recurrence of atrial fibrillation.  His Apple Watch does occasionally say it thinks he may be in atrial fibrillation but he has not had any symptoms with this.  He reports approximately 2% burden.  He has noted decrease in stamina, shortness of breath on exertion.  This overall  has not changed.      Past Surgical History:   Procedure Laterality Date    CARDIAC ABLATION  2022    CARDIAC ELECTROPHYSIOLOGY PROCEDURE N/A 2022    Procedure: Ablation atrial fibrillation. Hold Eliquis night before procedure;  Surgeon: Benedicto Gupta MD;  Location: [facility] INVASIVE LOCATION;  Service: Cardiovascular;  Laterality: N/A;    CARDIOVERSION  05/26/2022    x2    COLONOSCOPY      NASAL SEPTUM SURGERY      SINUS SURGERY         Family History   Problem Relation Age of Onset    Dementia Mother     Heart disease Father     Hyperlipidemia Father     No Known Problems Sister     No Known Problems Brother     No Known Problems Maternal Grandmother     No Known Problems Maternal Grandfather     No Known Problems Paternal Grandmother     No Known Problems Paternal Grandfather     No Known Problems Maternal Aunt     No Known Problems Maternal Uncle     No Known Problems Paternal Aunt     No Known Problems Paternal Uncle     Alzheimer's disease Other     Heart failure Other     Anemia Neg Hx     Arrhythmia Neg Hx     Asthma Neg Hx     Clotting disorder Neg Hx     Fainting Neg Hx     Heart attack Neg Hx     Hypertension Neg Hx        Social History     Socioeconomic History    Marital status: Significant Other   Tobacco Use    Smoking status: Former     Packs/day: 0.25     Years: 8.00     Additional pack years: 0.00     Total pack years: 2.00     Types: Cigarettes     Quit date:      Years since quittin.8    Smokeless tobacco: Never   Vaping Use    Vaping Use: Never used   Substance and Sexual Activity    Alcohol use: Yes     Alcohol/week: 1.0 standard drink of alcohol     Types: 1 Cans of beer per week     Comment: rarely    Drug use: No    Sexual activity: Defer         Current Outpatient Medications:     acetaminophen (TYLENOL) 500 MG tablet, Take 2 tablets by mouth Every 6 (Six) Hours As Needed for Mild Pain or Headache., Disp: , Rfl:     apixaban (ELIQUIS) 5 MG tablet tablet, Take 1 tablet  "by mouth 2 (Two) Times a Day., Disp: 60 tablet, Rfl: 11    Artificial Tear Ointment (DRY EYES OP), Apply 1 dose to eye(s) as directed by provider As Needed., Disp: , Rfl:     CRESTOR 20 MG tablet, Take 1 tablet by mouth Every Night., Disp: , Rfl:     fluticasone (FLONASE) 50 MCG/ACT nasal spray, 2 sprays into the nostril(s) as directed by provider As Needed for Rhinitis., Disp: , Rfl:     hydrOXYzine pamoate (Vistaril) 25 MG capsule, Take 1 capsule by mouth At Night As Needed (insomnia)., Disp: 30 capsule, Rfl: 1    levothyroxine (SYNTHROID, LEVOTHROID) 25 MCG tablet, Take 1 tablet by mouth Every Morning., Disp: , Rfl:     loratadine (CLARITIN) 10 MG tablet, Take 1 tablet by mouth As Needed for Allergies., Disp: , Rfl:     metroNIDAZOLE (METROGEL) 1 % gel, 1 application  As Needed., Disp: , Rfl:     mirtazapine (REMERON) 15 MG tablet, Take 1 tablet by mouth Every Night., Disp: , Rfl:     tadalafil (CIALIS) 5 MG tablet, Take 1 tablet by mouth Daily., Disp: , Rfl:     Allergies:   No Known Allergies        Vital Signs: Blood pressure 116/78, pulse 88, height 180.3 cm (71\"), weight 90.7 kg (200 lb), SpO2 95%.    Physical Exam  Vitals reviewed.   Cardiovascular:      Rate and Rhythm: Normal rate and regular rhythm.      Heart sounds: Normal heart sounds.   Pulmonary:      Effort: Pulmonary effort is normal.      Breath sounds: Normal breath sounds.   Musculoskeletal:      Right lower leg: No edema.      Left lower leg: No edema.   Skin:     General: Skin is warm and dry.         Lab Results   Component Value Date    GLUCOSE 94 06/24/2022    CALCIUM 9.4 06/24/2022     06/24/2022    K 4.4 06/24/2022    CO2 28.0 06/24/2022     06/24/2022    BUN 16 06/24/2022    CREATININE 1.01 06/24/2022    BCR 15.8 06/24/2022    ANIONGAP 8.0 06/24/2022     Lab Results   Component Value Date    WBC 8.65 06/24/2022    HGB 16.4 06/24/2022    HCT 49.7 06/24/2022    MCV 91.7 06/24/2022     06/24/2022     Lab Results "   Component Value Date    INR 1.23 (H) 06/24/2022    PROTIME 15.4 (H) 06/24/2022     Lab Results   Component Value Date    TSH 3.720 06/24/2022        Results for orders placed during the hospital encounter of 07/28/23    Adult Transthoracic Echo Complete W/ Cont if Necessary Per Protocol    Interpretation Summary    Estimated right ventricular systolic pressure from tricuspid regurgitation is normal (<35 mmHg).    Technically adequate study.    1.  LV size, function, wall motion, and wall thickness are normal.  Visually estimated ejection fraction is 55 to 60%.  3D ejection fraction is 50%.  Grade 1A diastolic dysfunction.  Mild biatrial enlargement.  The right ventricle appears to be mildly dilated but RV systolic function is grossly preserved.  No septal defect or intracavitary mass or thrombus.    2.  The mitral valve is morphologically abnormal.  There were 2 sclerotic nodules on the anterior mitral leaflet 1 at about the midpoint of the leaflet in 1 year the leaflet tip at the site of chordal attachment.  Neither of these lesions has the appearance of thrombus or vegetation although a healed vegetation cannot be excluded as a cause.  There is no mitral stenosis and there is trivial to mild mitral regurgitation.  The aortic valve is tricuspid and the left cusp is minimally sclerotic.  There is no aortic stenosis and there is mild AI.  Trivial to mild TR is demonstrated from a morphologically normal valve.    3.  No pericardial or great vessel pathology.    4.  Pulmonary artery systolic pressures are estimated in the low 30s.      I personally viewed and interpreted the patient's EKG/Telemetry/lab data.    Procedures  Advance Care Planning   ACP discussion was held with the patient during this visit. Patient does not have an advance directive, information provided.         Clarence Lemus Kameron  reports that he quit smoking about 47 years ago. His smoking use included cigarettes. He has a 2.00 pack-year smoking  history. He has never used smokeless tobacco.    Assessment & Plan    1. Paroxysmal atrial fibrillation / flutter  - s/p ablation 06/27/2022 consisting appointment isolation and CTI flutter ablation.  He did have a recurrence of a flutterin the setting of pneumonia. 48 hour monitor revealed 100% atrial flutter, started on sotalol 80 mg BID. No known recurrence since initiation.  Now weaned off sotalol and doing well.  -Continue eliquis for stroke prevention. Denies bleeding problems.    2. CARLOS ALBERTO (obstructive sleep apnea)  - Continue compliance with CPAP    3. Chronic fatigue  - He does continue to report fatigue and no endurance. He reports he is sleeping very poorly, believes this is the cause.      4.  Shortness of breath  He does report some shortness of breath on exertion but this is not a major concern.  Is not interfering with his daily activities.  Discussed the option of performing PFTs, we elected to hold off for now.  Follow Up:  Return in about 1 year (around 10/20/2024).    Benedicto Gupta MD  10/21/2022

## 2023-10-23 RX ORDER — HYDROXYZINE PAMOATE 25 MG/1
CAPSULE ORAL
Qty: 30 CAPSULE | Refills: 1 | Status: SHIPPED | OUTPATIENT
Start: 2023-10-23

## 2023-11-27 NOTE — TELEPHONE ENCOUNTER
Refill request for Eliquis to [facility] as cost is $20 verses $300 for 90 day supply at local pharmacy. Sent as requested.

## 2024-04-11 ENCOUNTER — OFFICE VISIT (OUTPATIENT)
Dept: UROLOGY | Facility: CLINIC | Age: 74
End: 2024-04-11
Payer: MEDICARE

## 2024-04-11 VITALS
HEIGHT: 71 IN | WEIGHT: 197.2 LBS | HEART RATE: 91 BPM | SYSTOLIC BLOOD PRESSURE: 126 MMHG | DIASTOLIC BLOOD PRESSURE: 73 MMHG | BODY MASS INDEX: 27.61 KG/M2

## 2024-04-11 DIAGNOSIS — R97.20 ELEVATED PROSTATE SPECIFIC ANTIGEN (PSA): Primary | ICD-10-CM

## 2024-04-11 PROCEDURE — 84154 ASSAY OF PSA FREE: CPT

## 2024-04-11 PROCEDURE — 84153 ASSAY OF PSA TOTAL: CPT

## 2024-04-11 NOTE — PROGRESS NOTES
"Chief Complaint:    Chief Complaint   Patient presents with    Elevated PSA       Vital Signs:   /73 (BP Location: Left arm, Patient Position: Sitting, Cuff Size: Adult)   Pulse 91   Ht 180.3 cm (70.98\")   Wt 89.4 kg (197 lb 3.2 oz)   BMI 27.52 kg/m²   Body mass index is 27.52 kg/m².      HPI:  Clarence Melo is a 74 y.o. male who presents today for follow up    History of Present Illness  Mr. Melo presents to the clinic for follow-up for elevated PSA.  He was seen initially by me in February 2023 for an elevated PSA greater than 5.  Repeated PSA free and total in office needed decreased to 4 with a good free percentage of roughly 26%.  Follow-up with your primary care provider in July 2023 and had a repeat PSA at that time that was still slightly elevated at 4.08.  I repeated the PSA roughly 1 month later jumped to a 13.  States he followed up with his primary care provider recently and had a repeat PSA but is unsure what the number was.  Did attempt to call his primary care provider but that did not answer.  He denies any current lower urinary tract symptoms.  He does continue tadalafil 5 mg once daily with no problems.  He denies any family history of prostate cancer.  He has no other complaints at this time.      Past Medical History:  Past Medical History:   Diagnosis Date    Atrial fibrillation     COVID 03/2022    no hospitalization; mild symptoms    Dry eyes     Environmental allergies     Hyperlipidemia     Hypertension     hypothyroidism     Pre-diabetes     diet and exercise controlled    Sleep apnea     has a c-pap       Current Meds:  Current Outpatient Medications   Medication Sig Dispense Refill    acetaminophen (TYLENOL) 500 MG tablet Take 2 tablets by mouth Every 6 (Six) Hours As Needed for Mild Pain or Headache.      apixaban (ELIQUIS) 5 MG tablet tablet Take 1 tablet by mouth 2 (Two) Times a Day. 180 tablet 3    Artificial Tear Ointment (DRY EYES OP) Apply 1 dose to eye(s) as directed by " provider As Needed.      CRESTOR 20 MG tablet Take 1 tablet by mouth Every Night.      fluticasone (FLONASE) 50 MCG/ACT nasal spray 2 sprays into the nostril(s) as directed by provider As Needed for Rhinitis.      hydrOXYzine pamoate (VISTARIL) 25 MG capsule TAKE 1 CAPSULE BY MOUTH ONCE DAILY AT BEDTIME IF NEEDED FOR TROUBLE SLEEPING 30 capsule 1    levothyroxine (SYNTHROID, LEVOTHROID) 25 MCG tablet Take 1 tablet by mouth Every Morning.      loratadine (CLARITIN) 10 MG tablet Take 1 tablet by mouth As Needed for Allergies.      metroNIDAZOLE (METROGEL) 1 % gel 1 Application As Needed.      mirtazapine (REMERON) 15 MG tablet Take 1 tablet by mouth Every Night.      tadalafil (CIALIS) 5 MG tablet Take 1 tablet by mouth Daily.       No current facility-administered medications for this visit.        Allergies:   No Known Allergies     Past Surgical History:  Past Surgical History:   Procedure Laterality Date    CARDIAC ABLATION  2022    CARDIAC ELECTROPHYSIOLOGY PROCEDURE N/A 2022    Procedure: Ablation atrial fibrillation. Hold Eliquis night before procedure;  Surgeon: Benedicto Gupta MD;  Location: [facility] INVASIVE LOCATION;  Service: Cardiovascular;  Laterality: N/A;    CARDIOVERSION  05/26/2022    x2    COLONOSCOPY      NASAL SEPTUM SURGERY      SINUS SURGERY         Social History:  Social History     Socioeconomic History    Marital status: Significant Other   Tobacco Use    Smoking status: Former     Current packs/day: 0.00     Average packs/day: 0.3 packs/day for 8.0 years (2.0 ttl pk-yrs)     Types: Cigarettes     Start date:      Quit date:      Years since quittin.3    Smokeless tobacco: Never   Vaping Use    Vaping status: Never Used   Substance and Sexual Activity    Alcohol use: Yes     Alcohol/week: 1.0 standard drink of alcohol     Types: 1 Cans of beer per week     Comment: rarely    Drug use: No    Sexual activity: Defer       Family History:  Family History   Problem Relation  Age of Onset    Dementia Mother     Heart disease Father     Hyperlipidemia Father     No Known Problems Sister     No Known Problems Brother     No Known Problems Maternal Grandmother     No Known Problems Maternal Grandfather     No Known Problems Paternal Grandmother     No Known Problems Paternal Grandfather     No Known Problems Maternal Aunt     No Known Problems Maternal Uncle     No Known Problems Paternal Aunt     No Known Problems Paternal Uncle     Alzheimer's disease Other     Heart failure Other     Anemia Neg Hx     Arrhythmia Neg Hx     Asthma Neg Hx     Clotting disorder Neg Hx     Fainting Neg Hx     Heart attack Neg Hx     Hypertension Neg Hx        Review of Systems:  Review of Systems   Constitutional:  Negative for chills, fatigue, fever and unexpected weight change.   Respiratory:  Positive for shortness of breath. Negative for cough, chest tightness and wheezing.    Cardiovascular:  Negative for chest pain and leg swelling.   Gastrointestinal:  Negative for abdominal pain, constipation, diarrhea, nausea and vomiting.   Genitourinary:  Negative for difficulty urinating, dysuria, frequency, penile pain and urgency.   Musculoskeletal:  Negative for back pain and joint swelling.   Skin:  Negative for rash.   Neurological:  Negative for dizziness and headaches.   Psychiatric/Behavioral:  Negative for confusion and suicidal ideas.        Physical Exam:  Physical Exam  Constitutional:       General: He is not in acute distress.     Appearance: Normal appearance.   HENT:      Head: Normocephalic and atraumatic.      Nose: Nose normal.      Mouth/Throat:      Mouth: Mucous membranes are moist.   Eyes:      Conjunctiva/sclera: Conjunctivae normal.   Cardiovascular:      Rate and Rhythm: Normal rate and regular rhythm.      Pulses: Normal pulses.      Heart sounds: Normal heart sounds.   Pulmonary:      Effort: Pulmonary effort is normal.      Breath sounds: Normal breath sounds.   Abdominal:       General: Bowel sounds are normal.      Palpations: Abdomen is soft.   Musculoskeletal:         General: Normal range of motion.      Cervical back: Normal range of motion.   Skin:     General: Skin is warm.   Neurological:      General: No focal deficit present.      Mental Status: He is alert and oriented to person, place, and time.   Psychiatric:         Mood and Affect: Mood normal.         Behavior: Behavior normal.         Thought Content: Thought content normal.         Judgment: Judgment normal.           Recent Image (CT and/or KUB):   CT Abdomen and Pelvis: No results found for this or any previous visit.     CT Stone Protocol: No results found for this or any previous visit.     KUB: No results found for this or any previous visit.       Labs:  Brief Urine Lab Results       None          No visits with results within 3 Month(s) from this visit.   Latest known visit with results is:   Hospital Outpatient Visit on 07/28/2023   Component Date Value Ref Range Status    EF_3D-VOL 07/28/2023 50.0  % Final    LVIDd 07/28/2023 4.5  cm Final    LVIDs 07/28/2023 3.2  cm Final    IVSd 07/28/2023 0.98  cm Final    LVPWd 07/28/2023 1.02  cm Final    FS 07/28/2023 28.3  % Final    IVS/LVPW 07/28/2023 0.96  cm Final    ESV(cubed) 07/28/2023 32.5  ml Final    EDV(cubed) 07/28/2023 88.1  ml Final    LV mass(C)d 07/28/2023 150.2  grams Final    MV E max monster 07/28/2023 70.1  cm/sec Final    MV A max monster 07/28/2023 57.9  cm/sec Final    MV dec time 07/28/2023 0.17  msec Final    MV E/A 07/28/2023 1.21   Final    Med Peak E' Monster 07/28/2023 7.3  cm/sec Final    Lat Peak E' Monster 07/28/2023 7.7  cm/sec Final    Avg E/e' ratio 07/28/2023 9.35   Final    TAPSE (>1.6) 07/28/2023 2.8  cm Final    RV S' 07/28/2023 14.6  cm/sec Final    LA dimension (2D)  07/28/2023 3.1  cm Final    LV V1 max 07/28/2023 89.3  cm/sec Final    LV V1 max PG 07/28/2023 3.2  mmHg Final    LV V1 mean PG 07/28/2023 2.00  mmHg Final    LV V1 VTI 07/28/2023 14.7   cm Final    Ao pk lucy 07/28/2023 97.6  cm/sec Final    Ao max PG 07/28/2023 3.8  mmHg Final    Ao mean PG 07/28/2023 2.00  mmHg Final    Ao V2 VTI 07/28/2023 17.7  cm Final    MV max PG 07/28/2023 2.5  mmHg Final    MV mean PG 07/28/2023 1.21  mmHg Final    MV V2 VTI 07/28/2023 20.9  cm Final    MV P1/2t 07/28/2023 68.7  msec Final    MVA(P1/2t) 07/28/2023 3.2  cm2 Final    MV dec slope 07/28/2023 354.0  cm/sec2 Final    TR max lucy 07/28/2023 232.8  cm/sec Final    TR max PG 07/28/2023 21.7  mmHg Final    RVSP(TR) 07/28/2023 29.7  mmHg Final    RAP systole 07/28/2023 8.0  mmHg Final    RV V1 max PG 07/28/2023 2.04  mmHg Final    RV V1 max 07/28/2023 71.4  cm/sec Final    RV V1 VTI 07/28/2023 15.9  cm Final    PA V2 max 07/28/2023 98.5  cm/sec Final    Ao root diam 07/28/2023 3.7  cm Final    Sinus 07/28/2023 3.4  cm Final    Dimensionless Index 07/28/2023 0.91  (DI) Final        Procedure: None  Procedures     I have reviewed and agree with the above PMH, PSH, FMH, social history, medications, allergies, and labs.     Assessment/Plan:   Problem List Items Addressed This Visit          Genitourinary and Reproductive     Elevated prostate specific antigen (PSA) - Primary    Relevant Orders    PSA Total+% Free (Serial)       Health Maintenance:   Health Maintenance Due   Topic Date Due    LIPID PANEL  Never done    COLORECTAL CANCER SCREENING  Never done    Pneumococcal Vaccine 65+ (1 of 2 - PCV) Never done    TDAP/TD VACCINES (1 - Tdap) Never done    ZOSTER VACCINE (1 of 2) Never done    RSV Vaccine - Adults (1 - 1-dose 60+ series) Never done    HEPATITIS C SCREENING  Never done    ANNUAL WELLNESS VISIT  Never done    BMI FOLLOWUP  02/24/2023    COVID-19 Vaccine (5 - 2023-24 season) 09/01/2023        Smoking Counseling: Former smoker.  Never used smokeless tobacco.  Counseling given.    Urine Incontinence: Patient reports that he is not currently experiencing any symptoms of urinary incontinence.    Patient was given  instructions and counseling regarding his condition or for health maintenance advice. Please see specific information pulled into the AVS if appropriate.    Patient Education:   Elevated PSA/enlarged prostate: Discussed the pathophysiology of enlarged prostate and obstruction.  We discussed the static and dynamic effects of large prostate as well as using 5 alpha reductase inhibitors versus alpha blockade.  We discussed the indications for transurethral surgery as well and/ or other therapeutic options available including all of the newer techniques.  Patient has been on tadalafil 5 mg daily with overall improvement.  PSA testing -given significantly elevated PSA at roughly 13 in July 2023 and recommending a repeat PSA free and total in office today.  I discussed the pathophysiology of PSA testing indicating its use in the diagnosis and management of prostate cancer.  I discussed the normal range being 0 to 4, but more appropriately being much closer to 0 to 2 in a normal male.  I discussed the fact that after a certain age it is against recommendation to use PSA testing especially in view of numerous comorbidities.  I discussed many of the things that can artificially raise PSA including put not limited to a recent infection, urinary tract infection, recent sexual intercourse, or even the type of movement such as manipulation of the prostate from riding a bicycle.  It was discussed that the most important use of PSA is the velocity measurement.  This refers to the change of PSA with time. I discussed that we look for greater than 20% rise over a year to help us make the prediction of prostate cancer.  I also discussed that in the case of prostate cancer indicating a radical prostatectomy, the PSA should be 0 and any rise indicates an early biochemical recurrence.  I will call patient with PSA results once available otherwise he can follow-up in 6 months or sooner if needed.  I did discuss the use of an MRI in the  outpatient setting for evaluation of prostatic lesions.  Also discussed the further workup of an office prostate biopsy to rule out prostate cancer.  Patient verbalized understanding and agreed to plan of care    Visit Diagnoses:    ICD-10-CM ICD-9-CM   1. Elevated prostate specific antigen (PSA)  R97.20 790.93       Meds Ordered During Visit:  No orders of the defined types were placed in this encounter.      Follow Up Appointment: 6 months or sooner if needed  No follow-ups on file.      This document has been electronically signed by Brayan Reyes PA-C   April 11, 2024 16:32 EDT    Part of this note may be an electronic transcription/translation of spoken language to printed text using the Dragon Dictation System.

## 2024-04-13 LAB
PSA FREE MFR SERPL: 22.1 %
PSA FREE SERPL-MCNC: 0.84 NG/ML
PSA SERPL-MCNC: 3.8 NG/ML (ref 0–4)

## 2024-04-15 ENCOUNTER — TELEPHONE (OUTPATIENT)
Dept: UROLOGY | Facility: CLINIC | Age: 74
End: 2024-04-15
Payer: MEDICARE

## 2024-04-15 NOTE — TELEPHONE ENCOUNTER
Called patient to discuss PSA results however he did not answer.  Left voicemail advising patient that PSA came back great and was lower than it was 1 year ago.  Recommend to keep 6-month follow-up.

## 2024-05-13 ENCOUNTER — OFFICE VISIT (OUTPATIENT)
Dept: CARDIOLOGY | Facility: CLINIC | Age: 74
End: 2024-05-13
Payer: MEDICARE

## 2024-05-13 VITALS
HEART RATE: 82 BPM | HEIGHT: 70 IN | WEIGHT: 198 LBS | OXYGEN SATURATION: 95 % | SYSTOLIC BLOOD PRESSURE: 127 MMHG | DIASTOLIC BLOOD PRESSURE: 74 MMHG | BODY MASS INDEX: 28.35 KG/M2

## 2024-05-13 DIAGNOSIS — I48.92 PAROXYSMAL ATRIAL FLUTTER: Primary | ICD-10-CM

## 2024-05-13 DIAGNOSIS — R06.02 SHORTNESS OF BREATH: ICD-10-CM

## 2024-05-13 DIAGNOSIS — F51.01 PRIMARY INSOMNIA: ICD-10-CM

## 2024-05-13 PROCEDURE — 3074F SYST BP LT 130 MM HG: CPT | Performed by: PHYSICIAN ASSISTANT

## 2024-05-13 PROCEDURE — 99214 OFFICE O/P EST MOD 30 MIN: CPT | Performed by: PHYSICIAN ASSISTANT

## 2024-05-13 PROCEDURE — 3078F DIAST BP <80 MM HG: CPT | Performed by: PHYSICIAN ASSISTANT

## 2024-05-13 RX ORDER — TRAZODONE HYDROCHLORIDE 100 MG/1
100 TABLET ORAL NIGHTLY
Qty: 30 TABLET | Refills: 5 | Status: SHIPPED | OUTPATIENT
Start: 2024-05-13

## 2024-05-13 NOTE — PROGRESS NOTES
CC:   Chief Complaint   Patient presents with   • Injury        HPI: Colton is a here today for recheck of right wrist sprain.  He has been using the wrist splint and medication as directed and states that the pain has resolved.  He is able to use the hand and wrist without difficulty. He states that he feels that he is ready to go back to work.    Other concerns include:  none    ROS: All other systems reviewed are normal.    History:  Past Medical History  has no past medical history on file.  Social History   reports that he quit smoking about a year ago. His smoking use included cigarettes. he has never used smokeless tobacco.  Allergies: has No Known Allergies.  Current Medications: has a current medication list which includes the following prescription(s): nabumetone and who cock-up nonmolde pre ots.    Objective   Physical Exam  Blood pressure 126/82, pulse 90, temperature 98 °F (36.7 °C), temperature source Temporal, resp. rate 16, height 5' 10\" (1.778 m), weight 89.8 kg (198 lb), SpO2 95 %.    GENERAL: pleasant and comfortable, no apparent distress  SKIN - normal color, normal texture, normal turgor, no bruises  MUSCULOSKEL:  Right wrist strength normal, finger dexterity normal.  Grasp strength normal.  No tenderness to palpation.  No pain with supination, pronation, flexion or extension.  EXTREMITIES:  No edema, peripheral pulses intact, capillary refil <3 seconds    Assessment / Plan  1. Wrist sprain, right, subsequent encounter  - Resolved.  May return to work without restriction    Schedule follow up: as needed    Signed: Sandie Judge CNP    Collaborating Physician: Mike Duran DO    Problem list     Subjective   Clarence Melo is a 74 y.o. male     Chief Complaint   Patient presents with    Follow-up     9 months   Problem list:     1. Low risk stress test 2016  1.1 low risk stress test January 2018  2. Preserved systolic function  Two-point 1 repeat echo in August 2023 with normal systolic function noted  3. Dyslipidemia  4. Obstructive sleep apnea being treated with CPAP  5.  Atrial fibrillation/flutter  5.1 diagnosed during ER visit and hospitalization at [facility] December 2021  5.2 recent electrocardioversion performed at [facility] in [place]  5.3 pulmonary vein ablation of atrial flutter June 2022  5.4 patient previously on sotalol and flecainide.  6.  Mitral regurgitation  6.1 moderate mitral regurgitation by echocardiogram March 2022  6.2 trivial to mild MR by echocardiogram August 2023    HPI    Patient is a 74-year-old male presenting back to the office for evaluation.  As above, he has history of atrial fibrillation/flutter and is underwent evaluation in the event of cardioversion through [facility].  Patient follows with EP services and underwent a vein ablation in pulmonary vein ablation of atrial flutter in June 2022.  Patient previously had been on rhythm suppressive therapy but has done well currently without these medications.  He also has history of valvular disease    He does not describe any chest pain or pressure.  He has a degree of dyspnea when trying to exert describes significant fatigue that has had lack of sleep.  Patient is dealing with issues concerning his wife with cancer.  He feels that his issues are likely contributing to his symptoms.  He does not describe PND or orthopnea.    No complaints of palpitations or dysrhythmic symptoms.  He is stable otherwise.    Current Outpatient Medications on File Prior to Visit   Medication Sig Dispense Refill    acetaminophen (TYLENOL) 500 MG tablet Take 2 tablets by mouth Every 6 (Six)  Hours As Needed for Mild Pain or Headache.      apixaban (ELIQUIS) 5 MG tablet tablet Take 1 tablet by mouth 2 (Two) Times a Day. 180 tablet 3    Artificial Tear Ointment (DRY EYES OP) Apply 1 dose to eye(s) as directed by provider As Needed.      CRESTOR 20 MG tablet Take 1 tablet by mouth Every Night.      fluticasone (FLONASE) 50 MCG/ACT nasal spray 2 sprays into the nostril(s) as directed by provider As Needed for Rhinitis.      hydrOXYzine pamoate (VISTARIL) 25 MG capsule TAKE 1 CAPSULE BY MOUTH ONCE DAILY AT BEDTIME IF NEEDED FOR TROUBLE SLEEPING 30 capsule 1    levothyroxine (SYNTHROID, LEVOTHROID) 25 MCG tablet Take 1 tablet by mouth Every Morning.      loratadine (CLARITIN) 10 MG tablet Take 1 tablet by mouth As Needed for Allergies.      metroNIDAZOLE (METROGEL) 1 % gel 1 Application As Needed.      tadalafil (CIALIS) 5 MG tablet Take 1 tablet by mouth Daily.      [DISCONTINUED] mirtazapine (REMERON) 15 MG tablet Take 1 tablet by mouth Every Night.       No current facility-administered medications on file prior to visit.       Patient has no known allergies.    Past Medical History:   Diagnosis Date    Atrial fibrillation     COVID 2022    no hospitalization; mild symptoms    Dry eyes     Environmental allergies     Hyperlipidemia     Hypertension     hypothyroidism     Pre-diabetes     diet and exercise controlled    Sleep apnea     has a c-pap       Social History     Socioeconomic History    Marital status: Significant Other   Tobacco Use    Smoking status: Former     Current packs/day: 0.00     Average packs/day: 0.3 packs/day for 8.0 years (2.0 ttl pk-yrs)     Types: Cigarettes     Start date:      Quit date:      Years since quittin.3    Smokeless tobacco: Never   Vaping Use    Vaping status: Never Used   Substance and Sexual Activity    Alcohol use: Yes     Alcohol/week: 1.0 standard drink of alcohol     Types: 1 Cans of beer per week     Comment: rarely    Drug use: No    Sexual  activity: Defer       Family History   Problem Relation Age of Onset    Dementia Mother     Heart disease Father     Hyperlipidemia Father     No Known Problems Sister     No Known Problems Brother     No Known Problems Maternal Grandmother     No Known Problems Maternal Grandfather     No Known Problems Paternal Grandmother     No Known Problems Paternal Grandfather     No Known Problems Maternal Aunt     No Known Problems Maternal Uncle     No Known Problems Paternal Aunt     No Known Problems Paternal Uncle     Alzheimer's disease Other     Heart failure Other     Anemia Neg Hx     Arrhythmia Neg Hx     Asthma Neg Hx     Clotting disorder Neg Hx     Fainting Neg Hx     Heart attack Neg Hx     Hypertension Neg Hx        Review of Systems   Constitutional:  Positive for fatigue. Negative for activity change, appetite change, chills and fever.   HENT: Negative.  Negative for congestion, sinus pressure and sinus pain.    Eyes: Negative.  Negative for visual disturbance.   Respiratory:  Positive for apnea and shortness of breath. Negative for cough, chest tightness and wheezing.    Cardiovascular: Negative.  Negative for chest pain, palpitations and leg swelling.   Gastrointestinal: Negative.  Negative for blood in stool.   Endocrine: Negative for cold intolerance and heat intolerance.   Genitourinary: Negative.  Negative for hematuria.   Musculoskeletal: Negative.  Negative for gait problem.   Skin: Negative.  Negative for color change, rash and wound.   Allergic/Immunologic: Negative.  Negative for environmental allergies and food allergies.   Neurological: Negative.  Negative for dizziness, syncope, weakness, light-headedness, numbness and headaches.   Hematological:  Bruises/bleeds easily.   Psychiatric/Behavioral: Negative.  Negative for sleep disturbance.        Objective   Vitals:    05/13/24 0913   BP: 127/74   BP Location: Left arm   Patient Position: Sitting   Cuff Size: Adult   Pulse: 82   SpO2: 95%  "  Weight: 89.8 kg (198 lb)   Height: 177.8 cm (70\")      /74 (BP Location: Left arm, Patient Position: Sitting, Cuff Size: Adult)   Pulse 82   Ht 177.8 cm (70\")   Wt 89.8 kg (198 lb)   SpO2 95%   BMI 28.41 kg/m²     Lab Results (most recent)       None            Physical Exam  Vitals and nursing note reviewed.   Constitutional:       General: He is not in acute distress.     Appearance: Normal appearance. He is well-developed.   HENT:      Head: Normocephalic and atraumatic.   Eyes:      General: No scleral icterus.        Right eye: No discharge.         Left eye: No discharge.      Conjunctiva/sclera: Conjunctivae normal.   Neck:      Vascular: No carotid bruit.   Cardiovascular:      Rate and Rhythm: Normal rate and regular rhythm.      Heart sounds: Normal heart sounds. No murmur heard.     No friction rub. No gallop.   Pulmonary:      Effort: Pulmonary effort is normal. No respiratory distress.      Breath sounds: Normal breath sounds. No wheezing or rales.   Chest:      Chest wall: No tenderness.   Musculoskeletal:      Right lower leg: No edema.      Left lower leg: No edema.   Skin:     General: Skin is warm and dry.      Coloration: Skin is not pale.      Findings: No erythema or rash.   Neurological:      Mental Status: He is alert and oriented to person, place, and time.      Cranial Nerves: No cranial nerve deficit.   Psychiatric:         Behavior: Behavior normal.         Procedure   Procedures       Assessment & Plan     Problems Addressed this Visit          Cardiac and Vasculature    Paroxysmal atrial flutter - Primary       Pulmonary and Pneumonias    Shortness of breath       Sleep    Primary insomnia     Diagnoses         Codes Comments    Paroxysmal atrial flutter    -  Primary ICD-10-CM: I48.92  ICD-9-CM: 427.32     Primary insomnia     ICD-10-CM: F51.01  ICD-9-CM: 307.42     Shortness of breath     ICD-10-CM: R06.02  ICD-9-CM: 786.05             Recommendation  1.  Patient is a " 73-year-old male presenting back for evaluation with history of paroxysmal atrial fibrillation/flutter and has done well.  He has no complaints of palpitations.  He has no complaints of bleeding on Eliquis.  For now, we can monitor as he sees EP services as well.    2.  Patient complains of insomnia.  He would like some medication.  I will prescribe him trazodone but want him to follow-up with his primary care provider if she is okay with that medication or she may want to change it.    3.  Patient with a degree of dyspnea but recent echo shows good LV function.  He does not describe any chest pain.  We discussed further cardiac testing but he feels he is doing well from a cardiac standpoint.  For now, we will make no changes.  He has routine labs performed by primary.  We can see him back for follow-up in 6 months or sooner if needed.  Follow-up with primary as scheduled.         Patient did not bring med list or medicine bottles to appointment, med list has been reviewed and updated based on patient's knowledge of their meds.      Advance Care Planning   ACP discussion was declined by the patient. Patient does not have an advance directive, declines further assistance.        Electronically signed by:

## 2024-05-13 NOTE — LETTER
May 13, 2024       No Recipients    Patient: Clarence Melo   YOB: 1950   Date of Visit: 5/13/2024       Dear ANTHONY Oswald    Clarence Melo was in my office today. Below is a copy of my note.    If you have questions, please do not hesitate to call me. I look forward to following Clarence along with you.         Sincerely,        ELADIO Jackson        CC:   No Recipients    Problem list     Subjective  Clarence Melo is a 74 y.o. male     Chief Complaint   Patient presents with   • Follow-up     9 months   Problem list:     1. Low risk stress test 2016  1.1 low risk stress test January 2018  2. Preserved systolic function  Two-point 1 repeat echo in August 2023 with normal systolic function noted  3. Dyslipidemia  4. Obstructive sleep apnea being treated with CPAP  5.  Atrial fibrillation/flutter  5.1 diagnosed during ER visit and hospitalization at [facility] December 2021  5.2 recent electrocardioversion performed at [facility] in [place]  5.3 pulmonary vein ablation of atrial flutter June 2022  5.4 patient previously on sotalol and flecainide.  6.  Mitral regurgitation  6.1 moderate mitral regurgitation by echocardiogram March 2022  6.2 trivial to mild MR by echocardiogram August 2023    HPI    Patient is a 74-year-old male presenting back to the office for evaluation.  As above, he has history of atrial fibrillation/flutter and is underwent evaluation in the event of cardioversion through [facility].  Patient follows with EP services and underwent a vein ablation in pulmonary vein ablation of atrial flutter in June 2022.  Patient previously had been on rhythm suppressive therapy but has done well currently without these medications.  He also has history of valvular disease    He does not describe any chest pain or pressure.  He has a degree of dyspnea when trying to exert describes significant fatigue that has had lack of sleep.  Patient is dealing with  issues concerning his wife with cancer.  He feels that his issues are likely contributing to his symptoms.  He does not describe PND or orthopnea.    No complaints of palpitations or dysrhythmic symptoms.  He is stable otherwise.    Current Outpatient Medications on File Prior to Visit   Medication Sig Dispense Refill   • acetaminophen (TYLENOL) 500 MG tablet Take 2 tablets by mouth Every 6 (Six) Hours As Needed for Mild Pain or Headache.     • apixaban (ELIQUIS) 5 MG tablet tablet Take 1 tablet by mouth 2 (Two) Times a Day. 180 tablet 3   • Artificial Tear Ointment (DRY EYES OP) Apply 1 dose to eye(s) as directed by provider As Needed.     • CRESTOR 20 MG tablet Take 1 tablet by mouth Every Night.     • fluticasone (FLONASE) 50 MCG/ACT nasal spray 2 sprays into the nostril(s) as directed by provider As Needed for Rhinitis.     • hydrOXYzine pamoate (VISTARIL) 25 MG capsule TAKE 1 CAPSULE BY MOUTH ONCE DAILY AT BEDTIME IF NEEDED FOR TROUBLE SLEEPING 30 capsule 1   • levothyroxine (SYNTHROID, LEVOTHROID) 25 MCG tablet Take 1 tablet by mouth Every Morning.     • loratadine (CLARITIN) 10 MG tablet Take 1 tablet by mouth As Needed for Allergies.     • metroNIDAZOLE (METROGEL) 1 % gel 1 Application As Needed.     • tadalafil (CIALIS) 5 MG tablet Take 1 tablet by mouth Daily.     • [DISCONTINUED] mirtazapine (REMERON) 15 MG tablet Take 1 tablet by mouth Every Night.       No current facility-administered medications on file prior to visit.       Patient has no known allergies.    Past Medical History:   Diagnosis Date   • Atrial fibrillation    • COVID 03/2022    no hospitalization; mild symptoms   • Dry eyes    • Environmental allergies    • Hyperlipidemia    • Hypertension    • hypothyroidism    • Pre-diabetes     diet and exercise controlled   • Sleep apnea     has a c-pap       Social History     Socioeconomic History   • Marital status: Significant Other   Tobacco Use   • Smoking status: Former     Current packs/day:  0.00     Average packs/day: 0.3 packs/day for 8.0 years (2.0 ttl pk-yrs)     Types: Cigarettes     Start date:      Quit date:      Years since quittin.3   • Smokeless tobacco: Never   Vaping Use   • Vaping status: Never Used   Substance and Sexual Activity   • Alcohol use: Yes     Alcohol/week: 1.0 standard drink of alcohol     Types: 1 Cans of beer per week     Comment: rarely   • Drug use: No   • Sexual activity: Defer       Family History   Problem Relation Age of Onset   • Dementia Mother    • Heart disease Father    • Hyperlipidemia Father    • No Known Problems Sister    • No Known Problems Brother    • No Known Problems Maternal Grandmother    • No Known Problems Maternal Grandfather    • No Known Problems Paternal Grandmother    • No Known Problems Paternal Grandfather    • No Known Problems Maternal Aunt    • No Known Problems Maternal Uncle    • No Known Problems Paternal Aunt    • No Known Problems Paternal Uncle    • Alzheimer's disease Other    • Heart failure Other    • Anemia Neg Hx    • Arrhythmia Neg Hx    • Asthma Neg Hx    • Clotting disorder Neg Hx    • Fainting Neg Hx    • Heart attack Neg Hx    • Hypertension Neg Hx        Review of Systems   Constitutional:  Positive for fatigue. Negative for activity change, appetite change, chills and fever.   HENT: Negative.  Negative for congestion, sinus pressure and sinus pain.    Eyes: Negative.  Negative for visual disturbance.   Respiratory:  Positive for apnea and shortness of breath. Negative for cough, chest tightness and wheezing.    Cardiovascular: Negative.  Negative for chest pain, palpitations and leg swelling.   Gastrointestinal: Negative.  Negative for blood in stool.   Endocrine: Negative for cold intolerance and heat intolerance.   Genitourinary: Negative.  Negative for hematuria.   Musculoskeletal: Negative.  Negative for gait problem.   Skin: Negative.  Negative for color change, rash and wound.   Allergic/Immunologic:  "Negative.  Negative for environmental allergies and food allergies.   Neurological: Negative.  Negative for dizziness, syncope, weakness, light-headedness, numbness and headaches.   Hematological:  Bruises/bleeds easily.   Psychiatric/Behavioral: Negative.  Negative for sleep disturbance.        Objective  Vitals:    05/13/24 0913   BP: 127/74   BP Location: Left arm   Patient Position: Sitting   Cuff Size: Adult   Pulse: 82   SpO2: 95%   Weight: 89.8 kg (198 lb)   Height: 177.8 cm (70\")      /74 (BP Location: Left arm, Patient Position: Sitting, Cuff Size: Adult)   Pulse 82   Ht 177.8 cm (70\")   Wt 89.8 kg (198 lb)   SpO2 95%   BMI 28.41 kg/m²     Lab Results (most recent)       None            Physical Exam  Vitals and nursing note reviewed.   Constitutional:       General: He is not in acute distress.     Appearance: Normal appearance. He is well-developed.   HENT:      Head: Normocephalic and atraumatic.   Eyes:      General: No scleral icterus.        Right eye: No discharge.         Left eye: No discharge.      Conjunctiva/sclera: Conjunctivae normal.   Neck:      Vascular: No carotid bruit.   Cardiovascular:      Rate and Rhythm: Normal rate and regular rhythm.      Heart sounds: Normal heart sounds. No murmur heard.     No friction rub. No gallop.   Pulmonary:      Effort: Pulmonary effort is normal. No respiratory distress.      Breath sounds: Normal breath sounds. No wheezing or rales.   Chest:      Chest wall: No tenderness.   Musculoskeletal:      Right lower leg: No edema.      Left lower leg: No edema.   Skin:     General: Skin is warm and dry.      Coloration: Skin is not pale.      Findings: No erythema or rash.   Neurological:      Mental Status: He is alert and oriented to person, place, and time.      Cranial Nerves: No cranial nerve deficit.   Psychiatric:         Behavior: Behavior normal.         Procedure  Procedures       Assessment & Plan    Problems Addressed this Visit          " Cardiac and Vasculature    Paroxysmal atrial flutter - Primary       Pulmonary and Pneumonias    Shortness of breath       Sleep    Primary insomnia     Diagnoses         Codes Comments    Paroxysmal atrial flutter    -  Primary ICD-10-CM: I48.92  ICD-9-CM: 427.32     Primary insomnia     ICD-10-CM: F51.01  ICD-9-CM: 307.42     Shortness of breath     ICD-10-CM: R06.02  ICD-9-CM: 786.05             Recommendation  1.  Patient is a 73-year-old male presenting back for evaluation with history of paroxysmal atrial fibrillation/flutter and has done well.  He has no complaints of palpitations.  He has no complaints of bleeding on Eliquis.  For now, we can monitor as he sees EP services as well.    2.  Patient complains of insomnia.  He would like some medication.  I will prescribe him trazodone but want him to follow-up with his primary care provider if she is okay with that medication or she may want to change it.    3.  Patient with a degree of dyspnea but recent echo shows good LV function.  He does not describe any chest pain.  We discussed further cardiac testing but he feels he is doing well from a cardiac standpoint.  For now, we will make no changes.  He has routine labs performed by primary.  We can see him back for follow-up in 6 months or sooner if needed.  Follow-up with primary as scheduled.         Patient did not bring med list or medicine bottles to appointment, med list has been reviewed and updated based on patient's knowledge of their meds.      Advance Care Planning  ACP discussion was declined by the patient. Patient does not have an advance directive, declines further assistance.        Electronically signed by:

## 2024-10-11 ENCOUNTER — OFFICE VISIT (OUTPATIENT)
Dept: UROLOGY | Facility: CLINIC | Age: 74
End: 2024-10-11
Payer: MEDICARE

## 2024-10-11 VITALS
SYSTOLIC BLOOD PRESSURE: 118 MMHG | HEART RATE: 88 BPM | WEIGHT: 198.2 LBS | HEIGHT: 70 IN | BODY MASS INDEX: 28.37 KG/M2 | DIASTOLIC BLOOD PRESSURE: 72 MMHG

## 2024-10-11 DIAGNOSIS — R97.20 ELEVATED PROSTATE SPECIFIC ANTIGEN (PSA): Primary | ICD-10-CM

## 2024-10-11 NOTE — PROGRESS NOTES
"Chief Complaint:    Chief Complaint   Patient presents with    Elevated PSA       Vital Signs:   /72   Pulse 88   Ht 177.8 cm (70\")   Wt 89.9 kg (198 lb 3.2 oz)   BMI 28.44 kg/m²   Body mass index is 28.44 kg/m².      HPI:  Clarence Melo is a 74 y.o. male who presents today for follow up    History of Present Illness  Mr. Melo presents to the clinic for follow-up for elevated PSA.  He initially had an elevated PSA of greater than 5 in February 2023.  He has followed along with me in office and had repeat PSAs that have all remained below 4.  He was last seen by me in office in April 2024 and had a repeat PSA free and total at that time that came back at 3.8 with a 22% free percentage.  He has been started on tadalafil by his PCP and reports improvement in lower urinary tract symptoms.  He has no urological complaints in office today.  Reports his wife was recently diagnosed with colon cancer and is underwent a partial colectomy as well as current chemotherapy.  He did have a repeat PSA by his PCP in August that came back stable at 3.8      Past Medical History:  Past Medical History:   Diagnosis Date    Atrial fibrillation     COVID 03/2022    no hospitalization; mild symptoms    Dry eyes     Environmental allergies     Hyperlipidemia     Hypertension     hypothyroidism     Pre-diabetes     diet and exercise controlled    Sleep apnea     has a c-pap       Current Meds:  Current Outpatient Medications   Medication Sig Dispense Refill    acetaminophen (TYLENOL) 500 MG tablet Take 2 tablets by mouth Every 6 (Six) Hours As Needed for Mild Pain or Headache.      apixaban (ELIQUIS) 5 MG tablet tablet Take 1 tablet by mouth 2 (Two) Times a Day. 180 tablet 3    Artificial Tear Ointment (DRY EYES OP) Apply 1 dose to eye(s) as directed by provider As Needed.      CRESTOR 20 MG tablet Take 1 tablet by mouth Every Night.      fluticasone (FLONASE) 50 MCG/ACT nasal spray Administer 2 sprays into the nostril(s) as " directed by provider As Needed for Rhinitis.      hydrOXYzine pamoate (VISTARIL) 25 MG capsule TAKE 1 CAPSULE BY MOUTH ONCE DAILY AT BEDTIME IF NEEDED FOR TROUBLE SLEEPING 30 capsule 1    levothyroxine (SYNTHROID, LEVOTHROID) 25 MCG tablet Take 1 tablet by mouth Every Morning.      loratadine (CLARITIN) 10 MG tablet Take 1 tablet by mouth As Needed for Allergies.      metroNIDAZOLE (METROGEL) 1 % gel 1 Application As Needed.      tadalafil (CIALIS) 5 MG tablet Take 1 tablet by mouth Daily.      traZODone (DESYREL) 100 MG tablet Take 1 tablet by mouth Every Night. 30 tablet 5     No current facility-administered medications for this visit.        Allergies:   No Known Allergies     Past Surgical History:  Past Surgical History:   Procedure Laterality Date    CARDIAC ABLATION  2022    CARDIAC ELECTROPHYSIOLOGY PROCEDURE N/A 2022    Procedure: Ablation atrial fibrillation. Hold Eliquis night before procedure;  Surgeon: Benedicto Gupta MD;  Location: [facility] INVASIVE LOCATION;  Service: Cardiovascular;  Laterality: N/A;    CARDIOVERSION  05/26/2022    x2    COLONOSCOPY      NASAL SEPTUM SURGERY      SINUS SURGERY         Social History:  Social History     Socioeconomic History    Marital status: Significant Other   Tobacco Use    Smoking status: Former     Current packs/day: 0.00     Average packs/day: 0.3 packs/day for 8.0 years (2.0 ttl pk-yrs)     Types: Cigarettes     Start date:      Quit date:      Years since quittin.8    Smokeless tobacco: Never   Vaping Use    Vaping status: Never Used   Substance and Sexual Activity    Alcohol use: Yes     Alcohol/week: 1.0 standard drink of alcohol     Types: 1 Cans of beer per week     Comment: rarely    Drug use: No    Sexual activity: Defer       Family History:  Family History   Problem Relation Age of Onset    Dementia Mother     Heart disease Father     Hyperlipidemia Father     No Known Problems Sister     No Known Problems Brother     No Known  Problems Maternal Grandmother     No Known Problems Maternal Grandfather     No Known Problems Paternal Grandmother     No Known Problems Paternal Grandfather     No Known Problems Maternal Aunt     No Known Problems Maternal Uncle     No Known Problems Paternal Aunt     No Known Problems Paternal Uncle     Alzheimer's disease Other     Heart failure Other     Anemia Neg Hx     Arrhythmia Neg Hx     Asthma Neg Hx     Clotting disorder Neg Hx     Fainting Neg Hx     Heart attack Neg Hx     Hypertension Neg Hx        Review of Systems:  Review of Systems   Constitutional:  Negative for chills, fatigue, fever and unexpected weight change.   Respiratory:  Positive for shortness of breath. Negative for cough, chest tightness and wheezing.    Cardiovascular:  Negative for chest pain and leg swelling.   Gastrointestinal:  Negative for abdominal pain, constipation, diarrhea, nausea and vomiting.   Genitourinary:  Negative for difficulty urinating, dysuria, frequency, penile pain and urgency.   Musculoskeletal:  Negative for back pain and joint swelling.   Skin:  Negative for rash.   Neurological:  Negative for dizziness and headaches.   Psychiatric/Behavioral:  Negative for confusion and suicidal ideas.        Physical Exam:  Physical Exam  Constitutional:       General: He is not in acute distress.     Appearance: Normal appearance.   HENT:      Head: Normocephalic and atraumatic.      Nose: Nose normal.      Mouth/Throat:      Mouth: Mucous membranes are moist.   Eyes:      Conjunctiva/sclera: Conjunctivae normal.   Cardiovascular:      Rate and Rhythm: Normal rate and regular rhythm.      Pulses: Normal pulses.      Heart sounds: Normal heart sounds.   Pulmonary:      Effort: Pulmonary effort is normal.      Breath sounds: Normal breath sounds.   Abdominal:      General: Bowel sounds are normal.      Palpations: Abdomen is soft.   Musculoskeletal:         General: Normal range of motion.      Cervical back: Normal range  of motion.   Skin:     General: Skin is warm.   Neurological:      General: No focal deficit present.      Mental Status: He is alert and oriented to person, place, and time.   Psychiatric:         Mood and Affect: Mood normal.         Behavior: Behavior normal.         Thought Content: Thought content normal.         Judgment: Judgment normal.           Recent Image (CT and/or KUB):   CT Abdomen and Pelvis: No results found for this or any previous visit.     CT Stone Protocol: No results found for this or any previous visit.     KUB: No results found for this or any previous visit.       Labs:  Brief Urine Lab Results       None          No visits with results within 3 Month(s) from this visit.   Latest known visit with results is:   Office Visit on 04/11/2024   Component Date Value Ref Range Status    PSA 04/11/2024 3.8  0.0 - 4.0 ng/mL Final    Roche ECLIA methodology.  According to the American Urological Association, Serum PSA should  decrease and remain at undetectable levels after radical  prostatectomy. The AUA defines biochemical recurrence as an initial  PSA value 0.2 ng/mL or greater followed by a subsequent confirmatory  PSA value 0.2 ng/mL or greater.  Values obtained with different assay methods or kits cannot be used  interchangeably. Results cannot be interpreted as absolute evidence  of the presence or absence of malignant disease.    PSA, Free 04/11/2024 0.84  N/A ng/mL Final    Roche ECLIA methodology.    PSA, Free % 04/11/2024 22.1  % Final    The table below lists the probability of prostate cancer for  men with non-suspicious CHENTE results and total PSA between  4 and 10 ng/mL, by patient age (Gill et al, VIANEY 1998,  279:1542).                    % Free PSA       50-64 yr        65-75 yr                    0.00-10.00%        56%             55%                   10.01-15.00%        24%             35%                   15.01-20.00%        17%             23%                   20.01-25.00%         10%             20%                        >25.00%         5%              9%  Please note:  Gill et al did not make specific                recommendations regarding the use of                percent free PSA for any other population                of men.        Procedure: None  Procedures     I have reviewed and agree with the above PMH, PSH, FMH, social history, medications, allergies, and labs.     Assessment/Plan:   Problem List Items Addressed This Visit       Elevated prostate specific antigen (PSA) - Primary       Health Maintenance:   Health Maintenance Due   Topic Date Due    LIPID PANEL  Never done    COLORECTAL CANCER SCREENING  Never done    Pneumococcal Vaccine 65+ (1 of 2 - PCV) Never done    TDAP/TD VACCINES (1 - Tdap) Never done    ZOSTER VACCINE (1 of 2) Never done    HEPATITIS C SCREENING  Never done    ANNUAL WELLNESS VISIT  Never done    BMI FOLLOWUP  02/24/2023        Smoking Counseling: Former smoker.  Never used smokeless tobacco.  Counseling given.    Urine Incontinence: Patient reports that he is not currently experiencing any symptoms of urinary incontinence.    Patient was given instructions and counseling regarding his condition or for health maintenance advice. Please see specific information pulled into the AVS if appropriate.    Patient Education:   Elevated PSA -patient's PSA has remained stable at 3.8 with a good free percentage around 22.  Did discuss repeat PSA in office today however he wishes to hold off until April of next year.  This is reasonable given the patient is asymptomatic at this time.  I did recommend to continue with tadalafil as needed to help with lower urinary tract symptoms.  Otherwise we will see him back in 6 months for repeat PSA.  He verbalized understanding.    Visit Diagnoses:    ICD-10-CM ICD-9-CM   1. Elevated prostate specific antigen (PSA)  R97.20 790.93     A total of 15 minutes were spent coordinating this patient’s care in clinic today; 10  minutes of which were face-to-face with the patient, reviewing medical history and counseling on the current treatment and followup plan.  All questions were answered to patient's satisfaction.    Meds Ordered During Visit:  No orders of the defined types were placed in this encounter.      Follow Up Appointment: 6 months  No follow-ups on file.      This document has been electronically signed by Brayan Reyes PA-C   October 11, 2024 16:40 EDT    Part of this note may be an electronic transcription/translation of spoken language to printed text using the Dragon Dictation System.

## 2024-11-15 ENCOUNTER — OFFICE VISIT (OUTPATIENT)
Dept: CARDIOLOGY | Facility: CLINIC | Age: 74
End: 2024-11-15
Payer: MEDICARE

## 2024-11-15 VITALS
BODY MASS INDEX: 28 KG/M2 | HEIGHT: 71 IN | OXYGEN SATURATION: 97 % | HEART RATE: 90 BPM | DIASTOLIC BLOOD PRESSURE: 72 MMHG | WEIGHT: 200 LBS | SYSTOLIC BLOOD PRESSURE: 146 MMHG

## 2024-11-15 DIAGNOSIS — I48.0 PAROXYSMAL ATRIAL FIBRILLATION: Primary | Chronic | ICD-10-CM

## 2024-11-15 DIAGNOSIS — I10 ESSENTIAL HYPERTENSION: Chronic | ICD-10-CM

## 2024-11-15 DIAGNOSIS — I48.92 PAROXYSMAL ATRIAL FLUTTER: Chronic | ICD-10-CM

## 2024-11-15 DIAGNOSIS — G47.33 OSA (OBSTRUCTIVE SLEEP APNEA): Chronic | ICD-10-CM

## 2024-11-15 PROCEDURE — G2211 COMPLEX E/M VISIT ADD ON: HCPCS

## 2024-11-15 PROCEDURE — 99214 OFFICE O/P EST MOD 30 MIN: CPT

## 2024-11-15 PROCEDURE — 3078F DIAST BP <80 MM HG: CPT

## 2024-11-15 PROCEDURE — 3077F SYST BP >= 140 MM HG: CPT

## 2024-11-15 PROCEDURE — 1159F MED LIST DOCD IN RCRD: CPT

## 2024-11-15 PROCEDURE — 1160F RVW MEDS BY RX/DR IN RCRD: CPT

## 2024-11-15 NOTE — PROGRESS NOTES
Cardiac Electrophysiology Outpatient Note  [facility]    Office Visit     Clarence Melo  [id number]  11/15/2024    Primary Care Physician: Sabrina Ruth APRN    Referred By: No ref. provider found    Subjective     Chief Complaint   Patient presents with    Atypical atrial flutter     PROBLEM LIST:  Atrial flutter  Atrial fibrillation  Diagnosed 12/2021. Started on amiodarone.   CHADSVASC = 2, on Eliquis  Echo 1/27/22: EF 55-60%, grade III diastolic dysfunction, mild restrictive physiology, LA mild to moderately dilated RA mildly enlarged. RV upper limits of normal. Mild MR. Trivial AI. RVSP 20.  Monitor 12/20-1/2/2022: 2% Afib/flutter. (70)bpm.   Echo 3/2022: EF 40-45%. Mild aortic sclerosis with trace AI, moderate MR, mild thickening of mitral valve leaflets.   S/p successful ECV, 3/2022.   Recurrent Aflutter noted 5/2022  S/p successful ECV, 5/26/22.  Atrial fibrillation ablation, 6/27/2022, Dr. Gupta.  Successful PVI, ablation of CTI dependent atrial flutter  Atrial flutter recurrence 07/2022, Sotalol restarted  14d monitor 7/2023: 61/83/hide 74 bpm, 7 episodes of atrial tach longest 8 beats, no A-fib or flutter.  No significant ectopy  Systolic heart failure  Echo 1/27/22: EF 55-60%, grade III diastolic dysfunction,  Echo 3/2022: EF 40-45%.   Echo 8/2023: LVEF 55-60%, grade 1 diastolic dysfunction mild biatrial enlargement, no significant VHD  Hyperlipidemia  Obstructive sleep apnea   Compliant with CPAP    History of Present Illness:   Clarence Melo is a 74 y.o. male who presents to my electrophysiology clinic for follow up of paroxysmal atrial fibrillation and atrial flutter and sleep apnea.  He also follows with a local cardiology group for heart failure with recovered LVEF and cardiac risk factor management.  We last saw the patient in October 2023.  Since then, the patient reports he has been stable from a cardiac standpoint he has had shortness of  breath baseline for a long time but is never had PFTs performed.  He had an echo last year that was unremarkable.  His main issue is insomnia. He reports compliance with his CPAP and has a follow-up with sleep medicine in early December.  He denies any recent palpitations, lightheadedness/dizziness, chest pain, edema or syncopal episodes.    Past Medical History:   Diagnosis Date    Atrial fibrillation     COVID 03/2022    no hospitalization; mild symptoms    Dry eyes     Environmental allergies     Hyperlipidemia     Hypertension     hypothyroidism     Pre-diabetes     diet and exercise controlled    Sleep apnea     has a c-pap       Past Surgical History:   Procedure Laterality Date    CARDIAC ABLATION  07/2022    CARDIAC ELECTROPHYSIOLOGY PROCEDURE N/A 06/27/2022    Procedure: Ablation atrial fibrillation. Hold Eliquis night before procedure;  Surgeon: Benedicto Gupta MD;  Location: [facility] INVASIVE LOCATION;  Service: Cardiovascular;  Laterality: N/A;    CARDIOVERSION  05/26/2022    x2    COLONOSCOPY      NASAL SEPTUM SURGERY      SINUS SURGERY         Family History   Problem Relation Age of Onset    Dementia Mother     Heart disease Father     Hyperlipidemia Father     No Known Problems Sister     No Known Problems Brother     No Known Problems Maternal Grandmother     No Known Problems Maternal Grandfather     No Known Problems Paternal Grandmother     No Known Problems Paternal Grandfather     No Known Problems Maternal Aunt     No Known Problems Maternal Uncle     No Known Problems Paternal Aunt     No Known Problems Paternal Uncle     Alzheimer's disease Other     Heart failure Other     Anemia Neg Hx     Arrhythmia Neg Hx     Asthma Neg Hx     Clotting disorder Neg Hx     Fainting Neg Hx     Heart attack Neg Hx     Hypertension Neg Hx        Social History     Socioeconomic History    Marital status: Significant Other   Tobacco Use    Smoking status: Former     Current packs/day: 0.00     Average  "packs/day: 0.3 packs/day for 8.0 years (2.0 ttl pk-yrs)     Types: Cigarettes     Start date:      Quit date:      Years since quittin.9    Smokeless tobacco: Never   Vaping Use    Vaping status: Never Used   Substance and Sexual Activity    Alcohol use: Yes     Alcohol/week: 1.0 standard drink of alcohol     Types: 1 Cans of beer per week     Comment: rarely    Drug use: No    Sexual activity: Defer         Current Outpatient Medications:     acetaminophen (TYLENOL) 500 MG tablet, Take 2 tablets by mouth Every 6 (Six) Hours As Needed for Mild Pain or Headache., Disp: , Rfl:     apixaban (ELIQUIS) 5 MG tablet tablet, Take 1 tablet by mouth 2 (Two) Times a Day., Disp: 180 tablet, Rfl: 3    Artificial Tear Ointment (DRY EYES OP), Apply 1 dose to eye(s) as directed by provider As Needed., Disp: , Rfl:     CRESTOR 20 MG tablet, Take 1 tablet by mouth Every Night., Disp: , Rfl:     fluticasone (FLONASE) 50 MCG/ACT nasal spray, Administer 2 sprays into the nostril(s) as directed by provider As Needed for Rhinitis., Disp: , Rfl:     hydrOXYzine pamoate (VISTARIL) 25 MG capsule, TAKE 1 CAPSULE BY MOUTH ONCE DAILY AT BEDTIME IF NEEDED FOR TROUBLE SLEEPING, Disp: 30 capsule, Rfl: 1    levothyroxine (SYNTHROID, LEVOTHROID) 25 MCG tablet, Take 1 tablet by mouth Every Morning., Disp: , Rfl:     loratadine (CLARITIN) 10 MG tablet, Take 1 tablet by mouth As Needed for Allergies., Disp: , Rfl:     metroNIDAZOLE (METROGEL) 1 % gel, 1 Application As Needed., Disp: , Rfl:     tadalafil (CIALIS) 5 MG tablet, Take 1 tablet by mouth Daily., Disp: , Rfl:     traZODone (DESYREL) 100 MG tablet, Take 1 tablet by mouth Every Night., Disp: 30 tablet, Rfl: 5    Allergies:   No Known Allergies    Objective   Vital Signs: Blood pressure 146/72, pulse 90, height 180.3 cm (71\"), weight 90.7 kg (200 lb), SpO2 97%.    PHYSICAL EXAM  General appearance: Awake, alert, cooperative  Head: Normocephalic, without obvious abnormality, " atraumatic  Lungs: Clear to ascultation bilaterally  Heart: Regular rate and rhythm, no murmurs, no lower extremity swelling  Skin: Skin color, turgor normal, no rashes or lesions  Neurologic: Grossly normal     Lab Results   Component Value Date    GLUCOSE 94 06/24/2022    CALCIUM 9.4 06/24/2022     06/24/2022    K 4.4 06/24/2022    CO2 28.0 06/24/2022     06/24/2022    BUN 16 06/24/2022    CREATININE 1.01 06/24/2022    BCR 15.8 06/24/2022    ANIONGAP 8.0 06/24/2022     Lab Results   Component Value Date    WBC 8.65 06/24/2022    HGB 16.4 06/24/2022    HCT 49.7 06/24/2022    MCV 91.7 06/24/2022     06/24/2022     Lab Results   Component Value Date    INR 1.23 (H) 06/24/2022    PROTIME 15.4 (H) 06/24/2022     Lab Results   Component Value Date    TSH 3.720 06/24/2022          Results for orders placed during the hospital encounter of 07/28/23    Adult Transthoracic Echo Complete W/ Cont if Necessary Per Protocol    Interpretation Summary    Estimated right ventricular systolic pressure from tricuspid regurgitation is normal (<35 mmHg).    Technically adequate study.    1.  LV size, function, wall motion, and wall thickness are normal.  Visually estimated ejection fraction is 55 to 60%.  3D ejection fraction is 50%.  Grade 1A diastolic dysfunction.  Mild biatrial enlargement.  The right ventricle appears to be mildly dilated but RV systolic function is grossly preserved.  No septal defect or intracavitary mass or thrombus.    2.  The mitral valve is morphologically abnormal.  There were 2 sclerotic nodules on the anterior mitral leaflet 1 at about the midpoint of the leaflet in 1 year the leaflet tip at the site of chordal attachment.  Neither of these lesions has the appearance of thrombus or vegetation although a healed vegetation cannot be excluded as a cause.  There is no mitral stenosis and there is trivial to mild mitral regurgitation.  The aortic valve is tricuspid and the left cusp is  minimally sclerotic.  There is no aortic stenosis and there is mild AI.  Trivial to mild TR is demonstrated from a morphologically normal valve.    3.  No pericardial or great vessel pathology.    4.  Pulmonary artery systolic pressures are estimated in the low 30s.         I personally viewed and interpreted the patient's EKG/Telemetry/lab data    Procedures    Clarence Melo  reports that he quit smoking about 48 years ago. His smoking use included cigarettes. He started smoking about 56 years ago. He has a 2 pack-year smoking history. He has never used smokeless tobacco.     Advance Care Planning   Advance Care Planning: ACP discussion was declined by the patient. Patient does not have an advance directive, information provided.     Assessment & Plan    1. Paroxysmal atrial fibrillation and atrial flutter  S/p ablation in 2022 including PVI and atrial flutter ablation  No perceived recurrence of arrhythmia  Eliquis for stroke prophylaxis    2. Essential hypertension  BP elevated in the office today at 146/72.  He agreed to log his blood pressure for 2 weeks and reach out to our office or his PCPs office if average blood pressure >1 130/90.    3. CARLOS ALBERTO (obstructive sleep apnea)  Reports CPAP compliance.  Reiterated the importance of CPAP compliance when trying to suppress atrial fibrillation       Follow Up:  Return in about 9 months (around 8/15/2025).      Thank you for allowing me to participate in the care of your patient. Please do not hesitate to contact me with additional questions or concerns.      Mono Villeda PA-C  Cardiac Electrophysiology  [facility] / [facility]

## 2024-12-04 ENCOUNTER — OFFICE VISIT (OUTPATIENT)
Dept: CARDIOLOGY | Facility: CLINIC | Age: 74
End: 2024-12-04
Payer: MEDICARE

## 2024-12-04 VITALS
HEART RATE: 78 BPM | OXYGEN SATURATION: 96 % | DIASTOLIC BLOOD PRESSURE: 75 MMHG | BODY MASS INDEX: 29.35 KG/M2 | HEIGHT: 70 IN | SYSTOLIC BLOOD PRESSURE: 119 MMHG | WEIGHT: 205 LBS

## 2024-12-04 DIAGNOSIS — I10 ESSENTIAL HYPERTENSION: Primary | ICD-10-CM

## 2024-12-04 DIAGNOSIS — R06.02 SHORTNESS OF BREATH: ICD-10-CM

## 2024-12-04 DIAGNOSIS — I48.92 PAROXYSMAL ATRIAL FLUTTER: ICD-10-CM

## 2024-12-04 NOTE — PROGRESS NOTES
Problem list     Subjective   Clarence Melo is a 74 y.o. male     Chief Complaint   Patient presents with    6 month follow up     Hx of paroxysmal atrial flutter   Problem list:     1. Low risk stress test 2016  1.1 low risk stress test January 2018  2. Preserved systolic function  2.1 repeat echo in August 2023 with normal systolic function noted  3. Dyslipidemia  4. Obstructive sleep apnea being treated with CPAP  5.  Atrial fibrillation/flutter  5.1 diagnosed during ER visit and hospitalization at [facility] December 2021  5.2 recent electrocardioversion performed at [facility] in [place]  5.3 pulmonary vein ablation of atrial flutter June 2022  5.4 patient previously on sotalol and flecainide.  6.  Mitral regurgitation  6.1 moderate mitral regurgitation by echocardiogram March 2022  6.2 trivial to mild MR by echocardiogram August 2023       HPI    Patient is a 74-year-old male presenting back to the office for follow-up.  Patient has done remarkably well.  He does not describe any chest pain or pressure.  His dyspnea is mild but he does not describe progression at all.  No complaints of PND or orthopnea.    He does not describe palpitating nor does he complain of dysrhythmic symptoms.  Overall, patient is felt well.      Current Outpatient Medications on File Prior to Visit   Medication Sig Dispense Refill    acetaminophen (TYLENOL) 500 MG tablet Take 2 tablets by mouth Every 6 (Six) Hours As Needed for Mild Pain or Headache.      apixaban (ELIQUIS) 5 MG tablet tablet Take 1 tablet by mouth 2 (Two) Times a Day. 180 tablet 3    Artificial Tear Ointment (DRY EYES OP) Apply 1 dose to eye(s) as directed by provider As Needed.      CRESTOR 20 MG tablet Take 1 tablet by mouth Every Night.      fluticasone (FLONASE) 50 MCG/ACT nasal spray Administer 2 sprays into the nostril(s) as directed by provider As Needed for Rhinitis.      hydrOXYzine pamoate (VISTARIL) 25 MG capsule TAKE 1 CAPSULE  BY MOUTH ONCE DAILY AT BEDTIME IF NEEDED FOR TROUBLE SLEEPING 30 capsule 1    levothyroxine (SYNTHROID, LEVOTHROID) 25 MCG tablet Take 1 tablet by mouth Every Morning.      loratadine (CLARITIN) 10 MG tablet Take 1 tablet by mouth As Needed for Allergies.      metroNIDAZOLE (METROGEL) 1 % gel 1 Application As Needed.      tadalafil (CIALIS) 5 MG tablet Take 1 tablet by mouth Daily.      traZODone (DESYREL) 100 MG tablet Take 1 tablet by mouth Every Night. 30 tablet 5     No current facility-administered medications on file prior to visit.       Patient has no known allergies.    Past Medical History:   Diagnosis Date    Atrial fibrillation     COVID 2022    no hospitalization; mild symptoms    Dry eyes     Environmental allergies     Hyperlipidemia     Hypertension     hypothyroidism     Pre-diabetes     diet and exercise controlled    Sleep apnea     has a c-pap       Social History     Socioeconomic History    Marital status: Significant Other   Tobacco Use    Smoking status: Former     Current packs/day: 0.00     Average packs/day: 0.3 packs/day for 8.0 years (2.0 ttl pk-yrs)     Types: Cigarettes     Start date:      Quit date:      Years since quittin.9    Smokeless tobacco: Never   Vaping Use    Vaping status: Never Used   Substance and Sexual Activity    Alcohol use: Yes     Alcohol/week: 1.0 standard drink of alcohol     Types: 1 Cans of beer per week     Comment: rarely    Drug use: No    Sexual activity: Defer       Family History   Problem Relation Age of Onset    Dementia Mother     Heart disease Father     Hyperlipidemia Father     No Known Problems Sister     No Known Problems Brother     No Known Problems Maternal Grandmother     No Known Problems Maternal Grandfather     No Known Problems Paternal Grandmother     No Known Problems Paternal Grandfather     No Known Problems Maternal Aunt     No Known Problems Maternal Uncle     No Known Problems Paternal Aunt     No Known Problems  "Paternal Uncle     Alzheimer's disease Other     Heart failure Other     Anemia Neg Hx     Arrhythmia Neg Hx     Asthma Neg Hx     Clotting disorder Neg Hx     Fainting Neg Hx     Heart attack Neg Hx     Hypertension Neg Hx        Review of Systems   Constitutional:  Negative for activity change, appetite change, chills, fatigue and fever.   HENT: Negative.  Negative for congestion, sinus pressure and sinus pain.    Eyes: Negative.  Negative for visual disturbance.   Respiratory:  Positive for shortness of breath. Negative for apnea, cough, chest tightness and wheezing.    Cardiovascular: Negative.  Negative for chest pain, palpitations and leg swelling.   Gastrointestinal: Negative.  Negative for blood in stool.   Endocrine: Negative.  Negative for cold intolerance and heat intolerance.   Genitourinary: Negative.  Negative for hematuria.   Musculoskeletal: Negative.  Negative for gait problem.   Skin: Negative.  Negative for color change, rash and wound.   Allergic/Immunologic: Negative.  Negative for environmental allergies and food allergies.   Neurological:  Negative for dizziness, syncope, weakness, light-headedness, numbness and headaches.   Hematological:  Bruises/bleeds easily.   Psychiatric/Behavioral: Negative.  Negative for sleep disturbance.        Objective   Vitals:    12/04/24 0944   BP: 119/75   BP Location: Right arm   Patient Position: Sitting   Cuff Size: Adult   Pulse: 78   SpO2: 96%   Weight: 93 kg (205 lb)   Height: 177.8 cm (70\")      /75 (BP Location: Right arm, Patient Position: Sitting, Cuff Size: Adult)   Pulse 78   Ht 177.8 cm (70\")   Wt 93 kg (205 lb)   SpO2 96%   BMI 29.41 kg/m²     Lab Results (most recent)       None            Physical Exam  Vitals and nursing note reviewed.   Constitutional:       General: He is not in acute distress.     Appearance: Normal appearance. He is well-developed.   HENT:      Head: Normocephalic and atraumatic.   Eyes:      General: No scleral " icterus.        Right eye: No discharge.         Left eye: No discharge.      Conjunctiva/sclera: Conjunctivae normal.   Neck:      Vascular: No carotid bruit.   Cardiovascular:      Rate and Rhythm: Normal rate and regular rhythm.      Heart sounds: Normal heart sounds. No murmur heard.     No friction rub. No gallop.   Pulmonary:      Effort: Pulmonary effort is normal. No respiratory distress.      Breath sounds: Normal breath sounds. No wheezing or rales.   Chest:      Chest wall: No tenderness.   Musculoskeletal:      Right lower leg: No edema.      Left lower leg: No edema.   Skin:     General: Skin is warm and dry.      Coloration: Skin is not pale.      Findings: No erythema or rash.   Neurological:      Mental Status: He is alert and oriented to person, place, and time.      Cranial Nerves: No cranial nerve deficit.   Psychiatric:         Behavior: Behavior normal.         Procedure   Procedures       Assessment & Plan     Problems Addressed this Visit          Cardiac and Vasculature    Essential hypertension - Primary    Paroxysmal atrial flutter       Pulmonary and Pneumonias    Shortness of breath     Diagnoses         Codes Comments    Essential hypertension    -  Primary ICD-10-CM: I10  ICD-9-CM: 401.9     Shortness of breath     ICD-10-CM: R06.02  ICD-9-CM: 786.05     Paroxysmal atrial flutter     ICD-10-CM: I48.92  ICD-9-CM: 427.32           Recommendations  1.  Patient is a 74-year-old male presenting to the office to be assessed.  He feels remarkable.  He does not describe any significant palpitations and has done well on Eliquis without complaints of bleeding.  For now, we can monitor.  He also follows with EP services.    2.  Blood pressure is relatively controlled at this time.  I will make no changes.    3.  Patient with a mild amount of dyspnea but nothing that has been progressive and functional status appears to be normal.  We can monitor for now.    4.  We will see him back for follow-up  in 6 months to year.  Follow-up with primary as scheduled.           Patient did not bring med list or medicine bottles to appointment, med list has been reviewed and updated based on patient's knowledge of their meds.      Advance Care Planning   ACP discussion was declined by the patient. Patient does not have an advance directive, declines further assistance.      Electronically signed by:

## 2024-12-04 NOTE — LETTER
December 4, 2024     ANTHONY Oswald  [address]  [address]  [facility] [address]    Patient: Clarence Melo   YOB: 1950   Date of Visit: 12/4/2024       Dear ANTHONY Oswald    Clarence Melo was in my office today. Below is a copy of my note.    If you have questions, please do not hesitate to call me. I look forward to following Clarence along with you.         Sincerely,        ELADIO Jackson        CC: No Recipients    Problem list     Subjective  Clarence Melo is a 74 y.o. male     Chief Complaint   Patient presents with   • 6 month follow up     Hx of paroxysmal atrial flutter   Problem list:     1. Low risk stress test 2016  1.1 low risk stress test January 2018  2. Preserved systolic function  2.1 repeat echo in August 2023 with normal systolic function noted  3. Dyslipidemia  4. Obstructive sleep apnea being treated with CPAP  5.  Atrial fibrillation/flutter  5.1 diagnosed during ER visit and hospitalization at [facility] December 2021  5.2 recent electrocardioversion performed at [facility] in [place]  5.3 pulmonary vein ablation of atrial flutter June 2022  5.4 patient previously on sotalol and flecainide.  6.  Mitral regurgitation  6.1 moderate mitral regurgitation by echocardiogram March 2022  6.2 trivial to mild MR by echocardiogram August 2023       HPI    Patient is a 74-year-old male presenting back to the office for follow-up.  Patient has done remarkably well.  He does not describe any chest pain or pressure.  His dyspnea is mild but he does not describe progression at all.  No complaints of PND or orthopnea.    He does not describe palpitating nor does he complain of dysrhythmic symptoms.  Overall, patient is felt well.      Current Outpatient Medications on File Prior to Visit   Medication Sig Dispense Refill   • acetaminophen (TYLENOL) 500 MG tablet Take 2 tablets by mouth Every 6 (Six) Hours As Needed for Mild Pain or Headache.      • apixaban (ELIQUIS) 5 MG tablet tablet Take 1 tablet by mouth 2 (Two) Times a Day. 180 tablet 3   • Artificial Tear Ointment (DRY EYES OP) Apply 1 dose to eye(s) as directed by provider As Needed.     • CRESTOR 20 MG tablet Take 1 tablet by mouth Every Night.     • fluticasone (FLONASE) 50 MCG/ACT nasal spray Administer 2 sprays into the nostril(s) as directed by provider As Needed for Rhinitis.     • hydrOXYzine pamoate (VISTARIL) 25 MG capsule TAKE 1 CAPSULE BY MOUTH ONCE DAILY AT BEDTIME IF NEEDED FOR TROUBLE SLEEPING 30 capsule 1   • levothyroxine (SYNTHROID, LEVOTHROID) 25 MCG tablet Take 1 tablet by mouth Every Morning.     • loratadine (CLARITIN) 10 MG tablet Take 1 tablet by mouth As Needed for Allergies.     • metroNIDAZOLE (METROGEL) 1 % gel 1 Application As Needed.     • tadalafil (CIALIS) 5 MG tablet Take 1 tablet by mouth Daily.     • traZODone (DESYREL) 100 MG tablet Take 1 tablet by mouth Every Night. 30 tablet 5     No current facility-administered medications on file prior to visit.       Patient has no known allergies.    Past Medical History:   Diagnosis Date   • Atrial fibrillation    • COVID 2022    no hospitalization; mild symptoms   • Dry eyes    • Environmental allergies    • Hyperlipidemia    • Hypertension    • hypothyroidism    • Pre-diabetes     diet and exercise controlled   • Sleep apnea     has a c-pap       Social History     Socioeconomic History   • Marital status: Significant Other   Tobacco Use   • Smoking status: Former     Current packs/day: 0.00     Average packs/day: 0.3 packs/day for 8.0 years (2.0 ttl pk-yrs)     Types: Cigarettes     Start date:      Quit date:      Years since quittin.9   • Smokeless tobacco: Never   Vaping Use   • Vaping status: Never Used   Substance and Sexual Activity   • Alcohol use: Yes     Alcohol/week: 1.0 standard drink of alcohol     Types: 1 Cans of beer per week     Comment: rarely   • Drug use: No   • Sexual activity: Defer        Family History   Problem Relation Age of Onset   • Dementia Mother    • Heart disease Father    • Hyperlipidemia Father    • No Known Problems Sister    • No Known Problems Brother    • No Known Problems Maternal Grandmother    • No Known Problems Maternal Grandfather    • No Known Problems Paternal Grandmother    • No Known Problems Paternal Grandfather    • No Known Problems Maternal Aunt    • No Known Problems Maternal Uncle    • No Known Problems Paternal Aunt    • No Known Problems Paternal Uncle    • Alzheimer's disease Other    • Heart failure Other    • Anemia Neg Hx    • Arrhythmia Neg Hx    • Asthma Neg Hx    • Clotting disorder Neg Hx    • Fainting Neg Hx    • Heart attack Neg Hx    • Hypertension Neg Hx        Review of Systems   Constitutional:  Negative for activity change, appetite change, chills, fatigue and fever.   HENT: Negative.  Negative for congestion, sinus pressure and sinus pain.    Eyes: Negative.  Negative for visual disturbance.   Respiratory:  Positive for shortness of breath. Negative for apnea, cough, chest tightness and wheezing.    Cardiovascular: Negative.  Negative for chest pain, palpitations and leg swelling.   Gastrointestinal: Negative.  Negative for blood in stool.   Endocrine: Negative.  Negative for cold intolerance and heat intolerance.   Genitourinary: Negative.  Negative for hematuria.   Musculoskeletal: Negative.  Negative for gait problem.   Skin: Negative.  Negative for color change, rash and wound.   Allergic/Immunologic: Negative.  Negative for environmental allergies and food allergies.   Neurological:  Negative for dizziness, syncope, weakness, light-headedness, numbness and headaches.   Hematological:  Bruises/bleeds easily.   Psychiatric/Behavioral: Negative.  Negative for sleep disturbance.        Objective  Vitals:    12/04/24 0944   BP: 119/75   BP Location: Right arm   Patient Position: Sitting   Cuff Size: Adult   Pulse: 78   SpO2: 96%   Weight: 93 kg  "(205 lb)   Height: 177.8 cm (70\")      /75 (BP Location: Right arm, Patient Position: Sitting, Cuff Size: Adult)   Pulse 78   Ht 177.8 cm (70\")   Wt 93 kg (205 lb)   SpO2 96%   BMI 29.41 kg/m²     Lab Results (most recent)       None            Physical Exam  Vitals and nursing note reviewed.   Constitutional:       General: He is not in acute distress.     Appearance: Normal appearance. He is well-developed.   HENT:      Head: Normocephalic and atraumatic.   Eyes:      General: No scleral icterus.        Right eye: No discharge.         Left eye: No discharge.      Conjunctiva/sclera: Conjunctivae normal.   Neck:      Vascular: No carotid bruit.   Cardiovascular:      Rate and Rhythm: Normal rate and regular rhythm.      Heart sounds: Normal heart sounds. No murmur heard.     No friction rub. No gallop.   Pulmonary:      Effort: Pulmonary effort is normal. No respiratory distress.      Breath sounds: Normal breath sounds. No wheezing or rales.   Chest:      Chest wall: No tenderness.   Musculoskeletal:      Right lower leg: No edema.      Left lower leg: No edema.   Skin:     General: Skin is warm and dry.      Coloration: Skin is not pale.      Findings: No erythema or rash.   Neurological:      Mental Status: He is alert and oriented to person, place, and time.      Cranial Nerves: No cranial nerve deficit.   Psychiatric:         Behavior: Behavior normal.         Procedure  Procedures       Assessment & Plan    Problems Addressed this Visit          Cardiac and Vasculature    Essential hypertension - Primary    Paroxysmal atrial flutter       Pulmonary and Pneumonias    Shortness of breath     Diagnoses         Codes Comments    Essential hypertension    -  Primary ICD-10-CM: I10  ICD-9-CM: 401.9     Shortness of breath     ICD-10-CM: R06.02  ICD-9-CM: 786.05     Paroxysmal atrial flutter     ICD-10-CM: I48.92  ICD-9-CM: 427.32           Recommendations  1.  Patient is a 74-year-old male presenting to " the office to be assessed.  He feels remarkable.  He does not describe any significant palpitations and has done well on Eliquis without complaints of bleeding.  For now, we can monitor.  He also follows with EP services.    2.  Blood pressure is relatively controlled at this time.  I will make no changes.    3.  Patient with a mild amount of dyspnea but nothing that has been progressive and functional status appears to be normal.  We can monitor for now.    4.  We will see him back for follow-up in 6 months to year.  Follow-up with primary as scheduled.           Patient did not bring med list or medicine bottles to appointment, med list has been reviewed and updated based on patient's knowledge of their meds.      Advance Care Planning  ACP discussion was declined by the patient. Patient does not have an advance directive, declines further assistance.      Electronically signed by:

## 2025-04-11 ENCOUNTER — OFFICE VISIT (OUTPATIENT)
Dept: UROLOGY | Facility: CLINIC | Age: 75
End: 2025-04-11
Payer: MEDICARE

## 2025-04-11 VITALS
BODY MASS INDEX: 29.75 KG/M2 | SYSTOLIC BLOOD PRESSURE: 151 MMHG | WEIGHT: 207.8 LBS | HEIGHT: 70 IN | DIASTOLIC BLOOD PRESSURE: 80 MMHG | HEART RATE: 79 BPM

## 2025-04-11 DIAGNOSIS — R97.20 ELEVATED PROSTATE SPECIFIC ANTIGEN (PSA): Primary | ICD-10-CM

## 2025-04-11 PROCEDURE — 84154 ASSAY OF PSA FREE: CPT

## 2025-04-11 PROCEDURE — 84153 ASSAY OF PSA TOTAL: CPT

## 2025-04-11 NOTE — PROGRESS NOTES
"Chief Complaint:    Chief Complaint   Patient presents with    Elevated PSA       Vital Signs:   /80   Pulse 79   Ht 177.8 cm (70\")   Wt 94.3 kg (207 lb 12.8 oz)   BMI 29.82 kg/m²   Body mass index is 29.82 kg/m².      HPI:  Clarence Melo is a 75 y.o. male who presents today for follow up    History of Present Illness  Mr. Melo presents to the clinic for follow-up for elevated PSA.  He initially had an elevated PSA of greater than 5 in February 2023.  He has followed along with me in office and had repeat PSAs that have all remained below 4.  He was seen in office in April 2020 for and had a repeat PSA free and total at that time that came back at 3.8 with a 22% free percentage.  He has been started on tadalafil by his PCP and reports improvement in lower urinary tract symptoms.  Did have a repeat PSA taken in August 2024 that was stable at 3.8.  He had a recent PSA taken in February 2025 that came back at 4.2.  His PSA has been as high as 13 in the past.  He has had a MDX select test result that showed a 44% risk for a positive prostate biopsy.  Patient reports that his wife at this time is currently undergoing chemotherapy treatments and he does not seek aggressive treatment.  I am recommending a repeat PSA to see if PSA is continuing to increase.  Otherwise he is doing well with lower urinary tract symptoms.      Past Medical History:  Past Medical History:   Diagnosis Date    Atrial fibrillation     COVID 03/2022    no hospitalization; mild symptoms    Dry eyes     Environmental allergies     Hyperlipidemia     Hypertension     hypothyroidism     Pre-diabetes     diet and exercise controlled    Sleep apnea     has a c-pap       Current Meds:  Current Outpatient Medications   Medication Sig Dispense Refill    acetaminophen (TYLENOL) 500 MG tablet Take 2 tablets by mouth Every 6 (Six) Hours As Needed for Mild Pain or Headache.      apixaban (ELIQUIS) 5 MG tablet tablet Take 1 tablet by mouth 2 (Two) " Times a Day. 180 tablet 3    Artificial Tear Ointment (DRY EYES OP) Apply 1 dose to eye(s) as directed by provider As Needed.      CRESTOR 20 MG tablet Take 1 tablet by mouth Every Night.      fluticasone (FLONASE) 50 MCG/ACT nasal spray Administer 2 sprays into the nostril(s) as directed by provider As Needed for Rhinitis.      hydrOXYzine pamoate (VISTARIL) 25 MG capsule TAKE 1 CAPSULE BY MOUTH ONCE DAILY AT BEDTIME IF NEEDED FOR TROUBLE SLEEPING 30 capsule 1    levothyroxine (SYNTHROID, LEVOTHROID) 25 MCG tablet Take 1 tablet by mouth Every Morning.      loratadine (CLARITIN) 10 MG tablet Take 1 tablet by mouth As Needed for Allergies.      metroNIDAZOLE (METROGEL) 1 % gel 1 Application As Needed.      tadalafil (CIALIS) 5 MG tablet Take 1 tablet by mouth Daily.      traZODone (DESYREL) 100 MG tablet Take 1 tablet by mouth Every Night. 30 tablet 5     No current facility-administered medications for this visit.        Allergies:   No Known Allergies     Past Surgical History:  Past Surgical History:   Procedure Laterality Date    CARDIAC ABLATION  2022    CARDIAC ELECTROPHYSIOLOGY PROCEDURE N/A 2022    Procedure: Ablation atrial fibrillation. Hold Eliquis night before procedure;  Surgeon: Benedicto Gupta MD;  Location: [facility] INVASIVE LOCATION;  Service: Cardiovascular;  Laterality: N/A;    CARDIOVERSION  05/26/2022    x2    COLONOSCOPY      NASAL SEPTUM SURGERY      SINUS SURGERY         Social History:  Social History     Socioeconomic History    Marital status: Significant Other   Tobacco Use    Smoking status: Former     Current packs/day: 0.00     Average packs/day: 0.3 packs/day for 8.0 years (2.0 ttl pk-yrs)     Types: Cigarettes     Start date:      Quit date:      Years since quittin.3    Smokeless tobacco: Never   Vaping Use    Vaping status: Never Used   Substance and Sexual Activity    Alcohol use: Yes     Alcohol/week: 1.0 standard drink of alcohol     Types: 1 Cans of beer  per week     Comment: rarely    Drug use: No    Sexual activity: Defer       Family History:  Family History   Problem Relation Age of Onset    Dementia Mother     Heart disease Father     Hyperlipidemia Father     No Known Problems Sister     No Known Problems Brother     No Known Problems Maternal Grandmother     No Known Problems Maternal Grandfather     No Known Problems Paternal Grandmother     No Known Problems Paternal Grandfather     No Known Problems Maternal Aunt     No Known Problems Maternal Uncle     No Known Problems Paternal Aunt     No Known Problems Paternal Uncle     Alzheimer's disease Other     Heart failure Other     Anemia Neg Hx     Arrhythmia Neg Hx     Asthma Neg Hx     Clotting disorder Neg Hx     Fainting Neg Hx     Heart attack Neg Hx     Hypertension Neg Hx        Review of Systems:  Review of Systems   Constitutional:  Negative for chills, fatigue, fever and unexpected weight change.   Respiratory:  Positive for shortness of breath. Negative for cough, chest tightness and wheezing.    Cardiovascular:  Negative for chest pain and leg swelling.   Gastrointestinal:  Negative for abdominal pain, constipation, diarrhea, nausea and vomiting.   Genitourinary:  Negative for difficulty urinating, dysuria, frequency, penile pain and urgency.   Musculoskeletal:  Negative for back pain and joint swelling.   Skin:  Negative for rash.   Neurological:  Negative for dizziness and headaches.   Psychiatric/Behavioral:  Negative for confusion and suicidal ideas.        Physical Exam:  Physical Exam  Constitutional:       General: He is not in acute distress.     Appearance: Normal appearance.   HENT:      Head: Normocephalic and atraumatic.      Nose: Nose normal.      Mouth/Throat:      Mouth: Mucous membranes are moist.   Eyes:      Conjunctiva/sclera: Conjunctivae normal.   Cardiovascular:      Rate and Rhythm: Normal rate.      Pulses: Normal pulses.   Pulmonary:      Effort: Pulmonary effort is  normal.   Abdominal:      Palpations: Abdomen is soft.   Musculoskeletal:         General: Normal range of motion.      Cervical back: Normal range of motion.   Skin:     General: Skin is warm.   Neurological:      General: No focal deficit present.      Mental Status: He is alert and oriented to person, place, and time.   Psychiatric:         Mood and Affect: Mood normal.         Behavior: Behavior normal.         Thought Content: Thought content normal.         Judgment: Judgment normal.           Recent Image (CT and/or KUB):   CT Abdomen and Pelvis: No results found for this or any previous visit.     CT Stone Protocol: No results found for this or any previous visit.     KUB: No results found for this or any previous visit.       Labs:  Brief Urine Lab Results       None          No visits with results within 3 Month(s) from this visit.   Latest known visit with results is:   Office Visit on 04/11/2024   Component Date Value Ref Range Status    PSA 04/11/2024 3.8  0.0 - 4.0 ng/mL Final    Roche ECLIA methodology.  According to the American Urological Association, Serum PSA should  decrease and remain at undetectable levels after radical  prostatectomy. The AUA defines biochemical recurrence as an initial  PSA value 0.2 ng/mL or greater followed by a subsequent confirmatory  PSA value 0.2 ng/mL or greater.  Values obtained with different assay methods or kits cannot be used  interchangeably. Results cannot be interpreted as absolute evidence  of the presence or absence of malignant disease.    PSA, Free 04/11/2024 0.84  N/A ng/mL Final    Roche ECLIA methodology.    PSA, Free % 04/11/2024 22.1  % Final    The table below lists the probability of prostate cancer for  men with non-suspicious CHENTE results and total PSA between  4 and 10 ng/mL, by patient age (Gill et al, VIANEY 1998,  279:1542).                    % Free PSA       50-64 yr        65-75 yr                    0.00-10.00%        56%             55%                    10.01-15.00%        24%             35%                   15.01-20.00%        17%             23%                   20.01-25.00%        10%             20%                        >25.00%         5%              9%  Please note:  Gill et al did not make specific                recommendations regarding the use of                percent free PSA for any other population                of men.        Procedure: None  Procedures     I have reviewed and agree with the above PMH, PSH, FMH, social history, medications, allergies, and labs.     Assessment/Plan:   Problem List Items Addressed This Visit       Elevated prostate specific antigen (PSA) - Primary    Relevant Orders    PSA Total+% Free (Serial)         Health Maintenance:   Health Maintenance Due   Topic Date Due    LIPID PANEL  Never done    Pneumococcal Vaccine 50+ (1 of 2 - PCV) Never done    TDAP/TD VACCINES (1 - Tdap) Never done    COLORECTAL CANCER SCREENING  Never done    ZOSTER VACCINE (1 of 2) Never done    AAA SCREEN ONCE  Never done    HEPATITIS C SCREENING  Never done    ANNUAL WELLNESS VISIT  Never done    COVID-19 Vaccine (6 - 2024-25 season) 03/26/2025    RSV Vaccine - Adults (1 - 1-dose 75+ series) Never done        Smoking Counseling: Former smoker.  Never used smokeless tobacco.  Counseling given.    Urine Incontinence: Patient reports that he is not currently experiencing any symptoms of urinary incontinence.    Patient was given instructions and counseling regarding his condition or for health maintenance advice. Please see specific information pulled into the AVS if appropriate.    Patient Education:   Elevated PSA -patient has had a slight increase in his PSA and I recommended repeat PSA free and total in office today.  I will call patient with results once available.  I did discuss further workup including MRI of the prostate with and without contrast due to the finding lesions concerning for prostatic cancer.  Did  advise patient he had a MDX select test result that showed a 44% risk for prostatic carcinoma.  Did discuss in office biopsy in detail with the patient as well.  Would recommend close follow-up in 6 months for repeat PSA this is for stability if patient does not wish to seek aggressive treatment at this time.  Did discuss the risk of delayed diagnosis of prostatic carcinoma.  Patient verbalized understanding.    Visit Diagnoses:    ICD-10-CM ICD-9-CM   1. Elevated prostate specific antigen (PSA)  R97.20 790.93       A total of 25 minutes were spent coordinating this patient’s care in clinic today; 15 minutes of which were face-to-face with the patient, reviewing medical history and counseling on the current treatment and followup plan.  All questions were answered to patient's satisfaction.    Meds Ordered During Visit:  No orders of the defined types were placed in this encounter.      Follow Up Appointment: 6 months  No follow-ups on file.      This document has been electronically signed by Brayan Reyes PA-C   April 11, 2025 15:44 EDT    Part of this note may be an electronic transcription/translation of spoken language to printed text using the Dragon Dictation System.

## 2025-04-15 LAB
PSA FREE SERPL-MCNC: NORMAL NG/ML
PSA SERPL-MCNC: NORMAL NG/ML

## 2025-04-16 ENCOUNTER — RESULTS FOLLOW-UP (OUTPATIENT)
Dept: UROLOGY | Facility: CLINIC | Age: 75
End: 2025-04-16
Payer: MEDICARE

## 2025-04-16 NOTE — TELEPHONE ENCOUNTER
Tempted to call the patient about PSA however did not answer.  Left a voicemail advising him to return to our office to repeat blood work due to an adequate specimen amount.  Advised to call back with questions or concerns

## 2025-05-12 ENCOUNTER — TELEPHONE (OUTPATIENT)
Dept: UROLOGY | Facility: CLINIC | Age: 75
End: 2025-05-12
Payer: MEDICARE

## 2025-05-12 DIAGNOSIS — R97.20 ELEVATED PROSTATE SPECIFIC ANTIGEN (PSA): Primary | ICD-10-CM

## 2025-05-12 NOTE — TELEPHONE ENCOUNTER
Routing to Quirino for advise    Quirino's message:    I am not sure. His PSA that we had took 1 month ago lab was unable to perform due to low specimen. Would recommend for him to come back in and repeat a PSA free and total.     Called pt and left vm to come in and have the PSA F/T done. Order is in.

## 2025-05-14 ENCOUNTER — LAB (OUTPATIENT)
Dept: UROLOGY | Facility: CLINIC | Age: 75
End: 2025-05-14
Payer: MEDICARE

## 2025-05-14 DIAGNOSIS — R97.20 ELEVATED PROSTATE SPECIFIC ANTIGEN (PSA): ICD-10-CM

## 2025-05-14 PROCEDURE — 84154 ASSAY OF PSA FREE: CPT

## 2025-05-14 PROCEDURE — 84153 ASSAY OF PSA TOTAL: CPT

## 2025-05-15 LAB
PSA FREE MFR SERPL: 19 %
PSA FREE SERPL-MCNC: 0.97 NG/ML
PSA SERPL-MCNC: 5.1 NG/ML (ref 0–4)

## 2025-05-16 ENCOUNTER — RESULTS FOLLOW-UP (OUTPATIENT)
Dept: UROLOGY | Facility: CLINIC | Age: 75
End: 2025-05-16
Payer: MEDICARE

## 2025-05-16 NOTE — TELEPHONE ENCOUNTER
Attempted to call patient about PSA results however he did not answer.  Left voice will advise him to call back to discuss further.  Did recommend having an MRI of the prostate or biopsy.

## 2025-05-19 ENCOUNTER — TELEPHONE (OUTPATIENT)
Dept: GASTROENTEROLOGY | Facility: CLINIC | Age: 75
End: 2025-05-19
Payer: MEDICARE

## 2025-05-19 DIAGNOSIS — R97.20 ELEVATED PROSTATE SPECIFIC ANTIGEN (PSA): Primary | ICD-10-CM

## 2025-05-19 NOTE — TELEPHONE ENCOUNTER
Patient called in and I relayed the message that his PSA was abnormal and that Quirino suggested an MRI or Biopsy. Patient stated that he would like a call back to discuss how  abnormal the results are. Patient also stated that he would prefer doing a MRI first.

## 2025-05-19 NOTE — TELEPHONE ENCOUNTER
Called patient and he wishes to proceed forward with an MRI.  Will get him scheduled appropriately.

## 2025-05-29 RX ORDER — TRAZODONE HYDROCHLORIDE 100 MG/1
100 TABLET ORAL NIGHTLY
Qty: 90 TABLET | Refills: 3 | Status: SHIPPED | OUTPATIENT
Start: 2025-05-29

## 2025-06-04 ENCOUNTER — HOSPITAL ENCOUNTER (OUTPATIENT)
Dept: MRI IMAGING | Facility: HOSPITAL | Age: 75
Discharge: HOME | End: 2025-06-04
Payer: MEDICARE

## 2025-06-04 ENCOUNTER — RESULTS FOLLOW-UP (OUTPATIENT)
Dept: GASTROENTEROLOGY | Facility: CLINIC | Age: 75
End: 2025-06-04
Payer: MEDICARE

## 2025-06-04 DIAGNOSIS — R97.20 ELEVATED PROSTATE SPECIFIC ANTIGEN (PSA): ICD-10-CM

## 2025-06-04 LAB — CREAT BLDA-MCNC: 0.9 MG/DL (ref 0.6–1.3)

## 2025-06-04 PROCEDURE — 72197 MRI PELVIS W/O & W/DYE: CPT

## 2025-06-04 PROCEDURE — A9573 GADOPICLENOL 0.5 MMOL/ML SOLUTION: HCPCS

## 2025-06-04 PROCEDURE — 25510000001 GADOPICLENOL 0.5 MMOL/ML SOLUTION

## 2025-06-04 PROCEDURE — 82565 ASSAY OF CREATININE: CPT

## 2025-06-04 RX ADMIN — GADOPICLENOL 9 ML: 485.1 INJECTION INTRAVENOUS at 12:57

## 2025-06-04 NOTE — TELEPHONE ENCOUNTER
Attempted to call patient about MRI results however he did not answer.  Left voicemail advising to call back to discuss in further detail and will keep his scheduled follow-up in October for repeat PSA.

## 2025-06-06 NOTE — TELEPHONE ENCOUNTER
Attempted to call the patient again however he did not answer.  He even attempted the patient's emergency contact and they did not answer as well.

## 2025-06-09 ENCOUNTER — OFFICE VISIT (OUTPATIENT)
Dept: CARDIOLOGY | Facility: CLINIC | Age: 75
End: 2025-06-09
Payer: MEDICARE

## 2025-06-09 VITALS
DIASTOLIC BLOOD PRESSURE: 84 MMHG | BODY MASS INDEX: 28.84 KG/M2 | OXYGEN SATURATION: 96 % | HEART RATE: 79 BPM | SYSTOLIC BLOOD PRESSURE: 127 MMHG | WEIGHT: 201 LBS

## 2025-06-09 DIAGNOSIS — I48.92 PAROXYSMAL ATRIAL FLUTTER: Primary | ICD-10-CM

## 2025-06-09 DIAGNOSIS — I10 ESSENTIAL HYPERTENSION: ICD-10-CM

## 2025-06-09 DIAGNOSIS — R06.02 SHORTNESS OF BREATH: ICD-10-CM

## 2025-06-09 PROCEDURE — 3074F SYST BP LT 130 MM HG: CPT | Performed by: PHYSICIAN ASSISTANT

## 2025-06-09 PROCEDURE — 99214 OFFICE O/P EST MOD 30 MIN: CPT | Performed by: PHYSICIAN ASSISTANT

## 2025-06-09 PROCEDURE — 3079F DIAST BP 80-89 MM HG: CPT | Performed by: PHYSICIAN ASSISTANT

## 2025-06-09 NOTE — LETTER
June 9, 2025     ANTHONY Oswald  [address]  [address]  [facility] [address]    Patient: Clarence Melo   YOB: 1950   Date of Visit: 6/9/2025       Dear ANTHONY Oswald    Clarence Melo was in my office today. Below is a copy of my note.    If you have questions, please do not hesitate to call me. I look forward to following Clarence along with you.         Sincerely,        ELADIO Jackson        CC: No Recipients    Problem list     Subjective  Clarence Melo is a 75 y.o. male     Chief Complaint   Patient presents with   • 6 month follow up     Essential HTN     Problem list:     1. Low risk stress test 2016  1.1 low risk stress test January 2018  2. Preserved systolic function  2.1 repeat echo in August 2023 with normal systolic function noted  3. Dyslipidemia  4. Obstructive sleep apnea being treated with CPAP  5.  Atrial fibrillation/flutter  5.1 diagnosed during ER visit and hospitalization at [facility] December 2021  5.2 recent electrocardioversion performed at [facility] in [place]  5.3 pulmonary vein ablation of atrial flutter June 2022  5.4 patient previously on sotalol and flecainide.  6.  Mitral regurgitation  6.1 moderate mitral regurgitation by echocardiogram March 2022  6.2 trivial to mild MR by echocardiogram August 2023     HPI    Patient is a 75-year-old male presenting to the to be evaluated.  Patient has done well.  He has no chest pain or pressure.    His main complaint appears to be dyspnea.  Patient describes going to check the mail and being significantly short of breath.  However, he describes having issues with dyspnea for a long time.  He is not sure if this was related to his age.  He does not describe PND or orthopnea.  He can experience mild lower extremity edema at times.    He does not describe any palpitations nor does he complain of any dysrhythmic symptoms.  No complaints of bleeding on Eliquis.  He is stable  otherwise.      Current Outpatient Medications on File Prior to Visit   Medication Sig Dispense Refill   • acetaminophen (TYLENOL) 500 MG tablet Take 2 tablets by mouth Every 6 (Six) Hours As Needed for Mild Pain or Headache.     • CRESTOR 20 MG tablet Take 1 tablet by mouth Every Night.     • fluticasone (FLONASE) 50 MCG/ACT nasal spray Administer 2 sprays into the nostril(s) as directed by provider As Needed for Rhinitis.     • hydrOXYzine pamoate (VISTARIL) 25 MG capsule TAKE 1 CAPSULE BY MOUTH ONCE DAILY AT BEDTIME IF NEEDED FOR TROUBLE SLEEPING 30 capsule 1   • levothyroxine (SYNTHROID, LEVOTHROID) 25 MCG tablet Take 1 tablet by mouth Every Morning.     • loratadine (CLARITIN) 10 MG tablet Take 1 tablet by mouth As Needed for Allergies.     • metroNIDAZOLE (METROGEL) 1 % gel 1 Application As Needed.     • [DISCONTINUED] apixaban (ELIQUIS) 5 MG tablet tablet Take 1 tablet by mouth 2 (Two) Times a Day. 180 tablet 3   • Artificial Tear Ointment (DRY EYES OP) Apply 1 dose to eye(s) as directed by provider As Needed.     • [DISCONTINUED] tadalafil (CIALIS) 5 MG tablet Take 1 tablet by mouth Daily.     • [DISCONTINUED] traZODone (DESYREL) 100 MG tablet TAKE 1 TABLET BY MOUTH EVERY NIGHT 90 tablet 3     No current facility-administered medications on file prior to visit.       Patient has no known allergies.    Past Medical History:   Diagnosis Date   • Atrial fibrillation    • COVID 2022    no hospitalization; mild symptoms   • Dry eyes    • Environmental allergies    • Hyperlipidemia    • Hypertension    • hypothyroidism    • Pre-diabetes     diet and exercise controlled   • Sleep apnea     has a c-pap       Social History     Socioeconomic History   • Marital status: Significant Other   Tobacco Use   • Smoking status: Former     Current packs/day: 0.00     Average packs/day: 0.3 packs/day for 8.0 years (2.0 ttl pk-yrs)     Types: Cigarettes     Start date:      Quit date:      Years since quittin.4    • Smokeless tobacco: Never   Vaping Use   • Vaping status: Never Used   Substance and Sexual Activity   • Alcohol use: Yes     Alcohol/week: 1.0 standard drink of alcohol     Types: 1 Cans of beer per week     Comment: rarely   • Drug use: No   • Sexual activity: Defer       Family History   Problem Relation Age of Onset   • Dementia Mother    • Heart disease Father    • Hyperlipidemia Father    • No Known Problems Sister    • No Known Problems Brother    • No Known Problems Maternal Grandmother    • No Known Problems Maternal Grandfather    • No Known Problems Paternal Grandmother    • No Known Problems Paternal Grandfather    • No Known Problems Maternal Aunt    • No Known Problems Maternal Uncle    • No Known Problems Paternal Aunt    • No Known Problems Paternal Uncle    • Alzheimer's disease Other    • Heart failure Other    • Anemia Neg Hx    • Arrhythmia Neg Hx    • Asthma Neg Hx    • Clotting disorder Neg Hx    • Fainting Neg Hx    • Heart attack Neg Hx    • Hypertension Neg Hx        Review of Systems   Constitutional:  Negative for activity change, appetite change, chills, fatigue and fever.   HENT: Negative.  Negative for congestion, sinus pressure and sinus pain.    Eyes: Negative.  Negative for visual disturbance.   Respiratory:  Positive for shortness of breath. Negative for apnea, cough, chest tightness and wheezing.    Cardiovascular: Negative.  Negative for chest pain, palpitations and leg swelling.   Gastrointestinal: Negative.  Negative for blood in stool.   Endocrine: Negative.  Negative for cold intolerance and heat intolerance.   Genitourinary: Negative.  Negative for hematuria.   Musculoskeletal: Negative.  Negative for gait problem.   Skin: Negative.  Negative for color change, rash and wound.   Allergic/Immunologic: Negative.  Negative for environmental allergies and food allergies.   Neurological:  Negative for dizziness, syncope, weakness, light-headedness, numbness and headaches.    Hematological:  Bruises/bleeds easily.   Psychiatric/Behavioral: Negative.  Negative for sleep disturbance.        Objective  Vitals:    06/09/25 1011   BP: 127/84   BP Location: Right arm   Patient Position: Sitting   Cuff Size: Adult   Pulse: 79   SpO2: 96%   Weight: 91.2 kg (201 lb)      /84 (BP Location: Right arm, Patient Position: Sitting, Cuff Size: Adult)   Pulse 79   Wt 91.2 kg (201 lb)   SpO2 96%   BMI 28.84 kg/m²     Lab Results (most recent)       None            Physical Exam  Vitals and nursing note reviewed.   Constitutional:       General: He is not in acute distress.     Appearance: Normal appearance. He is well-developed.   HENT:      Head: Normocephalic and atraumatic.   Eyes:      General: No scleral icterus.        Right eye: No discharge.         Left eye: No discharge.      Conjunctiva/sclera: Conjunctivae normal.   Neck:      Vascular: No carotid bruit.   Cardiovascular:      Rate and Rhythm: Normal rate and regular rhythm.      Heart sounds: Normal heart sounds. No murmur heard.     No friction rub. No gallop.   Pulmonary:      Effort: Pulmonary effort is normal. No respiratory distress.      Breath sounds: Normal breath sounds. No wheezing or rales.   Chest:      Chest wall: No tenderness.   Musculoskeletal:      Right lower leg: No edema.      Left lower leg: No edema.   Skin:     General: Skin is warm and dry.      Coloration: Skin is not pale.      Findings: No erythema or rash.   Neurological:      Mental Status: He is alert and oriented to person, place, and time.      Cranial Nerves: No cranial nerve deficit.   Psychiatric:         Behavior: Behavior normal.         Procedure  Procedures       Assessment & Plan    Problems Addressed this Visit          Cardiac and Vasculature    Essential hypertension    Paroxysmal atrial flutter - Primary       Pulmonary and Pneumonias    Shortness of breath     Diagnoses         Codes Comments      Paroxysmal atrial flutter    -  Primary  ICD-10-CM: I48.92  ICD-9-CM: 427.32       Shortness of breath     ICD-10-CM: R06.02  ICD-9-CM: 786.05       Essential hypertension     ICD-10-CM: I10  ICD-9-CM: 401.9             Recommendations  1.  Patient is a 75-year-old male with history of paroxysmal atrial flutter that is done well since his ablation.  He has no complaints of bleeding on Eliquis.  I will make no changes but continue to monitor rhythm state.    2.  With patient's dyspnea, we reviewed and discussed potential cardiac etiologies of his dyspnea.  We tried to have him undergo testing to evaluate his dyspnea.  However, he feels it is stable and would rather monitor.  If it was to worsen, as discussed with him, I want him to call the office.  I would consider repeating an echocardiogram but also a regular treadmill stress test to evaluate for chronotropic incompetence.    3.  Patient's blood pressure is stable at this time.  I will make no changes.    4.  We will see him back for follow-up in 6 months or sooner if needed.  Follow-up with primary as scheduled.           Clarence Melo  reports that he quit smoking about 49 years ago. His smoking use included cigarettes. He started smoking about 57 years ago. He has a 2 pack-year smoking history. He has never used smokeless tobacco.     Patient did not bring med list or medicine bottles to appointment, med list has been reviewed and updated based on patient's knowledge of their meds.    Electronically signed by:

## 2025-06-09 NOTE — PROGRESS NOTES
Problem list     Subjective   Clarence Melo is a 75 y.o. male     Chief Complaint   Patient presents with    6 month follow up     Essential HTN     Problem list:     1. Low risk stress test 2016  1.1 low risk stress test January 2018  2. Preserved systolic function  2.1 repeat echo in August 2023 with normal systolic function noted  3. Dyslipidemia  4. Obstructive sleep apnea being treated with CPAP  5.  Atrial fibrillation/flutter  5.1 diagnosed during ER visit and hospitalization at [facility] December 2021  5.2 recent electrocardioversion performed at [facility] in [place]  5.3 pulmonary vein ablation of atrial flutter June 2022  5.4 patient previously on sotalol and flecainide.  6.  Mitral regurgitation  6.1 moderate mitral regurgitation by echocardiogram March 2022  6.2 trivial to mild MR by echocardiogram August 2023     HPI    Patient is a 75-year-old male presenting to the to be evaluated.  Patient has done well.  He has no chest pain or pressure.    His main complaint appears to be dyspnea.  Patient describes going to check the mail and being significantly short of breath.  However, he describes having issues with dyspnea for a long time.  He is not sure if this was related to his age.  He does not describe PND or orthopnea.  He can experience mild lower extremity edema at times.    He does not describe any palpitations nor does he complain of any dysrhythmic symptoms.  No complaints of bleeding on Eliquis.  He is stable otherwise.      Current Outpatient Medications on File Prior to Visit   Medication Sig Dispense Refill    acetaminophen (TYLENOL) 500 MG tablet Take 2 tablets by mouth Every 6 (Six) Hours As Needed for Mild Pain or Headache.      CRESTOR 20 MG tablet Take 1 tablet by mouth Every Night.      fluticasone (FLONASE) 50 MCG/ACT nasal spray Administer 2 sprays into the nostril(s) as directed by provider As Needed for Rhinitis.      hydrOXYzine pamoate (VISTARIL) 25  MG capsule TAKE 1 CAPSULE BY MOUTH ONCE DAILY AT BEDTIME IF NEEDED FOR TROUBLE SLEEPING 30 capsule 1    levothyroxine (SYNTHROID, LEVOTHROID) 25 MCG tablet Take 1 tablet by mouth Every Morning.      loratadine (CLARITIN) 10 MG tablet Take 1 tablet by mouth As Needed for Allergies.      metroNIDAZOLE (METROGEL) 1 % gel 1 Application As Needed.      [DISCONTINUED] apixaban (ELIQUIS) 5 MG tablet tablet Take 1 tablet by mouth 2 (Two) Times a Day. 180 tablet 3    Artificial Tear Ointment (DRY EYES OP) Apply 1 dose to eye(s) as directed by provider As Needed.      [DISCONTINUED] tadalafil (CIALIS) 5 MG tablet Take 1 tablet by mouth Daily.      [DISCONTINUED] traZODone (DESYREL) 100 MG tablet TAKE 1 TABLET BY MOUTH EVERY NIGHT 90 tablet 3     No current facility-administered medications on file prior to visit.       Patient has no known allergies.    Past Medical History:   Diagnosis Date    Atrial fibrillation     COVID 2022    no hospitalization; mild symptoms    Dry eyes     Environmental allergies     Hyperlipidemia     Hypertension     hypothyroidism     Pre-diabetes     diet and exercise controlled    Sleep apnea     has a c-pap       Social History     Socioeconomic History    Marital status: Significant Other   Tobacco Use    Smoking status: Former     Current packs/day: 0.00     Average packs/day: 0.3 packs/day for 8.0 years (2.0 ttl pk-yrs)     Types: Cigarettes     Start date:      Quit date:      Years since quittin.4    Smokeless tobacco: Never   Vaping Use    Vaping status: Never Used   Substance and Sexual Activity    Alcohol use: Yes     Alcohol/week: 1.0 standard drink of alcohol     Types: 1 Cans of beer per week     Comment: rarely    Drug use: No    Sexual activity: Defer       Family History   Problem Relation Age of Onset    Dementia Mother     Heart disease Father     Hyperlipidemia Father     No Known Problems Sister     No Known Problems Brother     No Known Problems Maternal  Grandmother     No Known Problems Maternal Grandfather     No Known Problems Paternal Grandmother     No Known Problems Paternal Grandfather     No Known Problems Maternal Aunt     No Known Problems Maternal Uncle     No Known Problems Paternal Aunt     No Known Problems Paternal Uncle     Alzheimer's disease Other     Heart failure Other     Anemia Neg Hx     Arrhythmia Neg Hx     Asthma Neg Hx     Clotting disorder Neg Hx     Fainting Neg Hx     Heart attack Neg Hx     Hypertension Neg Hx        Review of Systems   Constitutional:  Negative for activity change, appetite change, chills, fatigue and fever.   HENT: Negative.  Negative for congestion, sinus pressure and sinus pain.    Eyes: Negative.  Negative for visual disturbance.   Respiratory:  Positive for shortness of breath. Negative for apnea, cough, chest tightness and wheezing.    Cardiovascular: Negative.  Negative for chest pain, palpitations and leg swelling.   Gastrointestinal: Negative.  Negative for blood in stool.   Endocrine: Negative.  Negative for cold intolerance and heat intolerance.   Genitourinary: Negative.  Negative for hematuria.   Musculoskeletal: Negative.  Negative for gait problem.   Skin: Negative.  Negative for color change, rash and wound.   Allergic/Immunologic: Negative.  Negative for environmental allergies and food allergies.   Neurological:  Negative for dizziness, syncope, weakness, light-headedness, numbness and headaches.   Hematological:  Bruises/bleeds easily.   Psychiatric/Behavioral: Negative.  Negative for sleep disturbance.        Objective   Vitals:    06/09/25 1011   BP: 127/84   BP Location: Right arm   Patient Position: Sitting   Cuff Size: Adult   Pulse: 79   SpO2: 96%   Weight: 91.2 kg (201 lb)      /84 (BP Location: Right arm, Patient Position: Sitting, Cuff Size: Adult)   Pulse 79   Wt 91.2 kg (201 lb)   SpO2 96%   BMI 28.84 kg/m²     Lab Results (most recent)       None            Physical  Exam  Vitals and nursing note reviewed.   Constitutional:       General: He is not in acute distress.     Appearance: Normal appearance. He is well-developed.   HENT:      Head: Normocephalic and atraumatic.   Eyes:      General: No scleral icterus.        Right eye: No discharge.         Left eye: No discharge.      Conjunctiva/sclera: Conjunctivae normal.   Neck:      Vascular: No carotid bruit.   Cardiovascular:      Rate and Rhythm: Normal rate and regular rhythm.      Heart sounds: Normal heart sounds. No murmur heard.     No friction rub. No gallop.   Pulmonary:      Effort: Pulmonary effort is normal. No respiratory distress.      Breath sounds: Normal breath sounds. No wheezing or rales.   Chest:      Chest wall: No tenderness.   Musculoskeletal:      Right lower leg: No edema.      Left lower leg: No edema.   Skin:     General: Skin is warm and dry.      Coloration: Skin is not pale.      Findings: No erythema or rash.   Neurological:      Mental Status: He is alert and oriented to person, place, and time.      Cranial Nerves: No cranial nerve deficit.   Psychiatric:         Behavior: Behavior normal.         Procedure   Procedures       Assessment & Plan     Problems Addressed this Visit          Cardiac and Vasculature    Essential hypertension    Paroxysmal atrial flutter - Primary       Pulmonary and Pneumonias    Shortness of breath     Diagnoses         Codes Comments      Paroxysmal atrial flutter    -  Primary ICD-10-CM: I48.92  ICD-9-CM: 427.32       Shortness of breath     ICD-10-CM: R06.02  ICD-9-CM: 786.05       Essential hypertension     ICD-10-CM: I10  ICD-9-CM: 401.9             Recommendations  1.  Patient is a 75-year-old male with history of paroxysmal atrial flutter that is done well since his ablation.  He has no complaints of bleeding on Eliquis.  I will make no changes but continue to monitor rhythm state.    2.  With patient's dyspnea, we reviewed and discussed potential cardiac  etiologies of his dyspnea.  We tried to have him undergo testing to evaluate his dyspnea.  However, he feels it is stable and would rather monitor.  If it was to worsen, as discussed with him, I want him to call the office.  I would consider repeating an echocardiogram but also a regular treadmill stress test to evaluate for chronotropic incompetence.    3.  Patient's blood pressure is stable at this time.  I will make no changes.    4.  We will see him back for follow-up in 6 months or sooner if needed.  Follow-up with primary as scheduled.           Clarence Melo  reports that he quit smoking about 49 years ago. His smoking use included cigarettes. He started smoking about 57 years ago. He has a 2 pack-year smoking history. He has never used smokeless tobacco.     Patient did not bring med list or medicine bottles to appointment, med list has been reviewed and updated based on patient's knowledge of their meds.    Electronically signed by:

## 2025-08-19 ENCOUNTER — OFFICE VISIT (OUTPATIENT)
Dept: CARDIOLOGY | Facility: CLINIC | Age: 75
End: 2025-08-19
Payer: MEDICARE

## 2025-08-19 VITALS
OXYGEN SATURATION: 97 % | SYSTOLIC BLOOD PRESSURE: 130 MMHG | TEMPERATURE: 98.3 F | WEIGHT: 200.9 LBS | HEIGHT: 70 IN | DIASTOLIC BLOOD PRESSURE: 70 MMHG | BODY MASS INDEX: 28.76 KG/M2 | HEART RATE: 88 BPM

## 2025-08-19 DIAGNOSIS — I10 ESSENTIAL HYPERTENSION: Chronic | ICD-10-CM

## 2025-08-19 DIAGNOSIS — G47.33 OSA (OBSTRUCTIVE SLEEP APNEA): Chronic | ICD-10-CM

## 2025-08-19 DIAGNOSIS — I48.0 PAROXYSMAL ATRIAL FIBRILLATION: Primary | Chronic | ICD-10-CM

## (undated) DEVICE — Device: Brand: PENTARAY NAV

## (undated) DEVICE — ELECTRD RETRN/GRND MEGADYNE SGL/PLT W/CORD 9FT DISP

## (undated) DEVICE — SYRINGE,PISTON,IRRIGATION,60ML,STERILE: Brand: MEDLINE

## (undated) DEVICE — STERILE (15.2 TAPERED TO 7.6 X 183CM) POLYETHYLENE ACCORDION-FOLDED COVER FOR USE WITH SIEMENS ACUNAV ULTRASOUND CATHETER FAMILY CONNECTOR: Brand: SWIFTLINK TRANSDUCER COVER

## (undated) DEVICE — SOL NACL 0.9PCT 1000ML

## (undated) DEVICE — INTRO SHEATH ENGAGE W/50 GW .038 8F12

## (undated) DEVICE — KT MANIFLD EP

## (undated) DEVICE — SET PRIMARY GRVTY 10DP MALE LL 104IN

## (undated) DEVICE — DRSNG SURESITE123 4X4.8IN

## (undated) DEVICE — Device: Brand: MEDEX

## (undated) DEVICE — PRESSURE MONITORING SET: Brand: TRUWAVE

## (undated) DEVICE — ST EXT IV LG BORE NDLESS FLTR LL 17IN

## (undated) DEVICE — INTRO SHEATH ART/FEM ENGAGE .035IN 9F 25CM

## (undated) DEVICE — SYS CLS VASC/VENI VASCADE MVP 6TO12F

## (undated) DEVICE — ST EXT IV SMARTSITE 2VLV SP M LL 5ML IV1

## (undated) DEVICE — Device: Brand: WEBSTER CS

## (undated) DEVICE — DECANT BG O JET

## (undated) DEVICE — Device: Brand: REFERENCE PATCH CARTO 3

## (undated) DEVICE — PROB S-CATH TEMP ESOPH 10F

## (undated) DEVICE — INTRO SHEATH ENGAGE W/50 GW .038 7F12

## (undated) DEVICE — ADULT, W/LG. BACK PAD, RADIOTRANSPARENT ELEMENT AND LEAD WIRE: Brand: DEFIBRILLATION ELECTRODES

## (undated) DEVICE — NDL PERC 1PART ECHOTIP WO/BASEPLT 18G 7CM

## (undated) DEVICE — Device: Brand: SOUNDSTAR

## (undated) DEVICE — LEX ELECTRO PHYSIOLOGY: Brand: MEDLINE INDUSTRIES, INC.

## (undated) DEVICE — 1 X VERSACROSS TRANSSEPTAL SHEATH (INCLUDING  1 X J-TIP GUIDEWIRE); 1 X VERSACROSS RF WIRE (INCLUDING 1 X CONNECTOR CABLE (SINGLE USE)); 1 X DISPERSIVE ELECTRODE: Brand: VERSACROSS ACCESS SOLUTION

## (undated) DEVICE — Device: Brand: SMARTABLATE

## (undated) DEVICE — DOME MONITORING W BONDED STPCK BIOTRANS2

## (undated) DEVICE — Device: Brand: THERMOCOOL SMARTTOUCH SF

## (undated) DEVICE — Device: Brand: VIZIGO

## (undated) DEVICE — ST INF PRI SMRTSTE 20DRP 2VLV 24ML 117